# Patient Record
Sex: FEMALE | Race: WHITE | NOT HISPANIC OR LATINO | Employment: FULL TIME | URBAN - METROPOLITAN AREA
[De-identification: names, ages, dates, MRNs, and addresses within clinical notes are randomized per-mention and may not be internally consistent; named-entity substitution may affect disease eponyms.]

---

## 2017-02-21 ENCOUNTER — GENERIC CONVERSION - ENCOUNTER (OUTPATIENT)
Dept: OTHER | Facility: OTHER | Age: 57
End: 2017-02-21

## 2017-04-10 ENCOUNTER — ALLSCRIPTS OFFICE VISIT (OUTPATIENT)
Dept: OTHER | Facility: OTHER | Age: 57
End: 2017-04-10

## 2017-04-12 ENCOUNTER — ALLSCRIPTS OFFICE VISIT (OUTPATIENT)
Dept: OTHER | Facility: OTHER | Age: 57
End: 2017-04-12

## 2017-08-25 ENCOUNTER — TRANSCRIBE ORDERS (OUTPATIENT)
Dept: SLEEP CENTER | Facility: CLINIC | Age: 57
End: 2017-08-25

## 2017-08-25 DIAGNOSIS — G47.33 OSA AND COPD OVERLAP SYNDROME (HCC): Primary | ICD-10-CM

## 2017-08-25 DIAGNOSIS — J44.9 OSA AND COPD OVERLAP SYNDROME (HCC): Primary | ICD-10-CM

## 2017-09-15 ENCOUNTER — TRANSCRIBE ORDERS (OUTPATIENT)
Dept: ADMINISTRATIVE | Facility: HOSPITAL | Age: 57
End: 2017-09-15

## 2017-09-15 DIAGNOSIS — D68.62 LUPUS ANTICOAGULANT DISORDER (HCC): ICD-10-CM

## 2017-09-15 DIAGNOSIS — D69.3 IMMUNE THROMBOCYTOPENIC PURPURA (HCC): Primary | ICD-10-CM

## 2017-09-15 DIAGNOSIS — D50.8 IRON DEFICIENCY ANEMIA SECONDARY TO INADEQUATE DIETARY IRON INTAKE: ICD-10-CM

## 2017-09-21 ENCOUNTER — HOSPITAL ENCOUNTER (OUTPATIENT)
Dept: ULTRASOUND IMAGING | Facility: HOSPITAL | Age: 57
Discharge: HOME | End: 2017-09-21
Attending: INTERNAL MEDICINE
Payer: COMMERCIAL

## 2017-09-21 DIAGNOSIS — D50.8 IRON DEFICIENCY ANEMIA SECONDARY TO INADEQUATE DIETARY IRON INTAKE: ICD-10-CM

## 2017-09-21 DIAGNOSIS — D69.3 IMMUNE THROMBOCYTOPENIC PURPURA (HCC): ICD-10-CM

## 2017-09-21 DIAGNOSIS — D68.62 LUPUS ANTICOAGULANT DISORDER (HCC): ICD-10-CM

## 2017-09-21 PROCEDURE — 76700 US EXAM ABDOM COMPLETE: CPT

## 2017-10-23 ENCOUNTER — TRANSCRIBE ORDERS (OUTPATIENT)
Dept: ADMINISTRATIVE | Facility: HOSPITAL | Age: 57
End: 2017-10-23

## 2017-10-23 DIAGNOSIS — D69.3 IMMUNE THROMBOCYTOPENIC PURPURA (HCC): Primary | ICD-10-CM

## 2017-10-23 DIAGNOSIS — D50.8 IRON DEFICIENCY ANEMIA SECONDARY TO INADEQUATE DIETARY IRON INTAKE: ICD-10-CM

## 2017-10-23 DIAGNOSIS — D68.62 LUPUS ANTICOAGULANT DISORDER (HCC): ICD-10-CM

## 2017-10-28 ENCOUNTER — HOSPITAL ENCOUNTER (OUTPATIENT)
Dept: CT IMAGING | Facility: HOSPITAL | Age: 57
Discharge: HOME | End: 2017-10-28
Attending: INTERNAL MEDICINE
Payer: COMMERCIAL

## 2017-10-28 DIAGNOSIS — D68.62 LUPUS ANTICOAGULANT DISORDER (HCC): ICD-10-CM

## 2017-10-28 DIAGNOSIS — D69.3 IMMUNE THROMBOCYTOPENIC PURPURA (HCC): ICD-10-CM

## 2017-10-28 DIAGNOSIS — D50.8 IRON DEFICIENCY ANEMIA SECONDARY TO INADEQUATE DIETARY IRON INTAKE: ICD-10-CM

## 2017-10-28 PROCEDURE — 74178 CT ABD&PLV WO CNTR FLWD CNTR: CPT

## 2017-10-28 RX ADMIN — IODIXANOL 100 ML: 320 INJECTION, SOLUTION INTRAVASCULAR at 07:42

## 2018-01-08 ENCOUNTER — HOSPITAL ENCOUNTER (OUTPATIENT)
Dept: INFUSION CENTER | Facility: HOSPITAL | Age: 58
Discharge: HOME | End: 2018-01-10
Payer: COMMERCIAL

## 2018-01-08 ENCOUNTER — LAB REQUISITION (OUTPATIENT)
Dept: LAB | Facility: HOSPITAL | Age: 58
End: 2018-01-08
Payer: COMMERCIAL

## 2018-01-08 VITALS
RESPIRATION RATE: 18 BRPM | HEART RATE: 52 BPM | DIASTOLIC BLOOD PRESSURE: 65 MMHG | TEMPERATURE: 97.7 F | SYSTOLIC BLOOD PRESSURE: 135 MMHG

## 2018-01-08 DIAGNOSIS — D64.9 ANEMIA: ICD-10-CM

## 2018-01-08 LAB
ABO GROUP BLD: NORMAL
BLD GP AB SCN SERPL QL: NEGATIVE
RH BLD: POSITIVE
SPECIMEN EXPIRATION DATE: NORMAL

## 2018-01-08 PROCEDURE — 86850 RBC ANTIBODY SCREEN: CPT | Performed by: NURSE PRACTITIONER

## 2018-01-08 PROCEDURE — 96365 THER/PROPH/DIAG IV INF INIT: CPT

## 2018-01-08 PROCEDURE — 36430 TRANSFUSION BLD/BLD COMPNT: CPT

## 2018-01-08 PROCEDURE — P9035 PLATELET PHERES LEUKOREDUCED: HCPCS

## 2018-01-08 PROCEDURE — 96375 TX/PRO/DX INJ NEW DRUG ADDON: CPT

## 2018-01-08 PROCEDURE — 86901 BLOOD TYPING SEROLOGIC RH(D): CPT | Performed by: NURSE PRACTITIONER

## 2018-01-08 PROCEDURE — 86900 BLOOD TYPING SEROLOGIC ABO: CPT | Performed by: NURSE PRACTITIONER

## 2018-01-08 RX ORDER — LEVOTHYROXINE SODIUM 0.12 MG/1
125 TABLET ORAL DAILY
COMMUNITY
End: 2019-03-05 | Stop reason: SDUPTHER

## 2018-01-08 RX ORDER — ERGOCALCIFEROL 1.25 MG/1
CAPSULE ORAL WEEKLY
COMMUNITY
Start: 2011-11-01

## 2018-01-08 RX ORDER — ATENOLOL 25 MG/1
TABLET ORAL
COMMUNITY
Start: 2011-12-14 | End: 2019-04-05 | Stop reason: SDUPTHER

## 2018-01-08 RX ORDER — HYDROXYCHLOROQUINE SULFATE 200 MG/1
TABLET, FILM COATED ORAL
COMMUNITY
Start: 2011-11-04

## 2018-01-08 RX ORDER — FOLIC ACID 1 MG/1
TABLET ORAL
COMMUNITY
Start: 2017-12-09 | End: 2019-09-13 | Stop reason: ALTCHOICE

## 2018-01-08 RX ORDER — QUINAPRIL 5 1/1
10 TABLET ORAL DAILY
COMMUNITY
Start: 2012-01-10 | End: 2019-07-08 | Stop reason: SDUPTHER

## 2018-01-08 RX ORDER — ACETAMINOPHEN 325 MG/1
650 TABLET ORAL ONCE
Status: COMPLETED | OUTPATIENT
Start: 2018-01-08 | End: 2018-01-08

## 2018-01-08 RX ORDER — WARFARIN SODIUM 2.5 MG/1
1 TABLET ORAL DAILY
COMMUNITY
End: 2021-08-20 | Stop reason: SDUPTHER

## 2018-01-08 RX ADMIN — HYDROCORTISONE SODIUM SUCCINATE 100 MG: 100 INJECTION, POWDER, FOR SOLUTION INTRAMUSCULAR; INTRAVENOUS at 10:48

## 2018-01-08 RX ADMIN — ACETAMINOPHEN 650 MG: 325 TABLET, FILM COATED ORAL at 10:48

## 2018-01-08 RX ADMIN — DIPHENHYDRAMINE HYDROCHLORIDE 25 MG: 50 INJECTION, SOLUTION INTRAMUSCULAR; INTRAVENOUS at 10:48

## 2018-01-08 NOTE — PROGRESS NOTES
Pt presents to infusion center for platelets  Pt tolerated infusion without difficulty or complaint  Signs and symptoms of thrombocytopenia reviewed pt verbalized understanding pt states she is starting rituxan with Dr Roma Maldonado office this weds

## 2018-01-09 LAB
ABO GROUP BLD BPU: NORMAL
BPU ID: NORMAL
UNIT DISPENSE STATUS: NORMAL
UNIT PRODUCT CODE: NORMAL
UNIT RH: NORMAL

## 2018-01-11 NOTE — PROGRESS NOTES
Assessment    1  Physical examination of employee (V70 5) (Z02 89)    Plan  Physical examination of employee    · PPD    Discussion/Summary  health maintenance visit Currently, she eats an adequate diet and has an adequate exercise regimen  the risks and benefits of cervical cancer screening were discussed cervical cancer screening is current cervical cancer screening is managed by gyn Breast cancer screening: the risks and benefits of breast cancer screening were discussed, monthly self breast exam was advised, mammogram is current and breast cancer screening is managed by gyn  Colorectal cancer screening: the risks and benefits of colorectal cancer screening were discussed and colorectal cancer screening is current  Osteoporosis screening: the risks and benefits of osteoporosis screening were discussed  The risks and benefits of immunizations were discussed  Advice and education were given regarding nutrition, aerobic exercise, weight bearing exercise, weight loss, calcium supplements, vitamin D supplements, sunscreen use, self skin examination and fall risk reduction  PPDs skin test applied  Form completed  Patient to return to the office in 48-72 hours to read the PPD  Patient to continue present treatment  Possible side effects of new medications were reviewed with the patient/guardian today  The treatment plan was reviewed with the patient/guardian  The patient/guardian understands and agrees with the treatment plan      Chief Complaint  Physical - form to be completed      History of Present Illness  HM, Adult Female: The patient is being seen for a health maintenance evaluation  The last health maintenance visit was 5 year(s) ago  General Health: The patient's health since the last visit is described as good  She has regular dental visits  She denies vision problems  Vision care includes wearing glasses  She denies hearing loss  Lifestyle:  She consumes a diverse and healthy diet   She has weight concerns  She exercises regularly  She exercises 3 or more times per week for 30 or more minutes per session  Exercise includes walking and stretching/yoga/pilates  She does not use tobacco  She consumes alcohol  She reports occasional alcohol use  She denies drug use  Reproductive health: the patient is postmenopausal    Screening: cancer screening reviewed and current  metabolic screening reviewed and current  HPI: Patient is here for employment physical for [employer]  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 and PPD skin test  Patient is feeling well overall without complaints  Patient has been exercising regularly  Patient follows with her specialists regularly including cardiology, rheumatology and hematology  Active Problems    1  Acute sinusitis (461 9) (J01 90)   2  Acute URI (465 9) (J06 9)   3  Bronchitis, acute (466 0) (J20 9)   4  Cough (786 2) (R05)   5  Screening breast examination (V76 10) (Z12 39)    Past Medical History    · History of Acute URI (465 9) (J06 9)    Current Meds   1  Aspirin EC 81 MG Oral Tablet Delayed Release; Take 1 tablet daily as directed Recorded   2  Atenolol 25 MG Oral Tablet; Therapy: 61BMA3909 to (Last Rx:85Jsa4149)  Requested for: 50KHT0254 Ordered   3  Coumadin 2 5 MG Oral Tablet; Take 1 tablet daily Recorded   4  Hydroxychloroquine Sulfate 200 MG Oral Tablet; Therapy: 69XAX9160 to (Last Rx:04Nov2011)  Requested for: 58HLT1455 Ordered   5  Multivitamin TABS; Therapy: (Recorded:70Qta9225) to Recorded   6  Quinapril HCl - 5 MG Oral Tablet; Therapy: 12DLO7395 to (Last Rx:10Jan2012)  Requested for: 93CDB0781 Ordered   7  Synthroid 125 MCG Oral Tablet; TAKE 1 TABLET EVERY OTHER DAY Recorded   8  Vitamin D (Ergocalciferol) 17237 UNIT Oral Capsule; Therapy: 98FCY4055 to (Last Rx:01Nov2011)  Requested for: 30VKD5949 Ordered    Allergies    1  Penicillins   2   Erythromycin TABS    Vitals   Recorded: 10Apr2017 05:33PM Recorded: 10Apr2017 05:11PM   Temperature  98 8 F   Heart Rate 68    Respiration 16    Systolic 327    Diastolic 78    Height  5 ft 0 05 in   Weight  201 lb    BMI Calculated  39 19   BSA Calculated  1 87     Physical Exam    Constitutional   General appearance: No acute distress, well appearing and well nourished  Eyes   Conjunctiva and lids: No swelling, erythema or discharge  Ears, Nose, Mouth, and Throat   External inspection of ears and nose: Normal     Otoscopic examination: Tympanic membranes translucent with normal light reflex  Canals patent without erythema  Oropharynx: Normal with no erythema, edema, exudate or lesions  Pulmonary   Respiratory effort: No increased work of breathing or signs of respiratory distress  Auscultation of lungs: Clear to auscultation  Cardiovascular   Auscultation of heart: Abnormal   The heart rate was normal  The rhythm was regular  Heart sounds: a click was heard  Prosthetic valve: prosthetic mitral valve heard  Examination of extremities for edema and/or varicosities: Normal     Abdomen   Abdomen: Non-tender, no masses  Lymphatic   Palpation of lymph nodes in neck: No lymphadenopathy  Musculoskeletal   Gait and station: Normal     Inspection/palpation of joints, bones, and muscles: Normal     Skin   Skin and subcutaneous tissue: Normal without rashes or lesions  Psychiatric   Orientation to person, place, and time: Normal     Mood and affect: Normal        Health Management  Screening breast examination   MAMMO DIAGNOSTIC BILATERAL W CAD; every 1 year; Last [id number]; Next Due:  21BHS6199; Active    Signatures   Electronically signed by : Marya Ballard DO;  Apr 10 2017  5:50PM EST                       (Author)

## 2018-01-14 VITALS
TEMPERATURE: 98.8 F | RESPIRATION RATE: 16 BRPM | BODY MASS INDEX: 39.46 KG/M2 | HEIGHT: 60 IN | DIASTOLIC BLOOD PRESSURE: 78 MMHG | HEART RATE: 68 BPM | SYSTOLIC BLOOD PRESSURE: 134 MMHG | WEIGHT: 201 LBS

## 2018-01-15 ENCOUNTER — LAB (OUTPATIENT)
Dept: LAB | Facility: MEDICAL CENTER | Age: 58
End: 2018-01-15
Payer: COMMERCIAL

## 2018-01-15 ENCOUNTER — TRANSCRIBE ORDERS (OUTPATIENT)
Dept: ADMINISTRATIVE | Facility: HOSPITAL | Age: 58
End: 2018-01-15

## 2018-01-15 DIAGNOSIS — D69.3 AUTOIMMUNE THROMBOCYTOPENIA (HCC): ICD-10-CM

## 2018-01-15 DIAGNOSIS — D69.3 AUTOIMMUNE THROMBOCYTOPENIA (HCC): Primary | ICD-10-CM

## 2018-01-15 LAB
ERYTHROCYTE [DISTWIDTH] IN BLOOD BY AUTOMATED COUNT: 15.5 % (ref 11.6–15.1)
HCT VFR BLD AUTO: 39.4 % (ref 34.8–46.1)
HGB BLD-MCNC: 13 G/DL (ref 11.5–15.4)
INR PPP: 2.86 (ref 0.86–1.16)
MCH RBC QN AUTO: 26.6 PG (ref 26.8–34.3)
MCHC RBC AUTO-ENTMCNC: 33 G/DL (ref 31.4–37.4)
MCV RBC AUTO: 81 FL (ref 82–98)
PMV BLD AUTO: 12.2 FL (ref 8.9–12.7)
PROTHROMBIN TIME: 30.4 SECONDS (ref 12.1–14.4)
RBC # BLD AUTO: 4.88 MILLION/UL (ref 3.81–5.12)
WBC # BLD AUTO: 14.71 THOUSAND/UL (ref 4.31–10.16)

## 2018-01-15 PROCEDURE — 85610 PROTHROMBIN TIME: CPT

## 2018-01-15 PROCEDURE — 36415 COLL VENOUS BLD VENIPUNCTURE: CPT

## 2018-01-15 PROCEDURE — 85027 COMPLETE CBC AUTOMATED: CPT

## 2018-02-16 ENCOUNTER — LAB REQUISITION (OUTPATIENT)
Dept: LAB | Facility: HOSPITAL | Age: 58
End: 2018-02-16
Payer: COMMERCIAL

## 2018-02-16 DIAGNOSIS — D69.3 IMMUNE THROMBOCYTOPENIC PURPURA (HCC): ICD-10-CM

## 2018-02-16 DIAGNOSIS — D50.8 OTHER IRON DEFICIENCY ANEMIAS (CODE): ICD-10-CM

## 2018-02-16 LAB
IGA SERPL-MCNC: 609 MG/DL (ref 70–400)
IGG SERPL-MCNC: 1620 MG/DL (ref 700–1600)
IGM SERPL-MCNC: 141 MG/DL (ref 40–230)

## 2018-02-16 PROCEDURE — 82784 ASSAY IGA/IGD/IGG/IGM EACH: CPT | Performed by: NURSE PRACTITIONER

## 2018-02-19 ENCOUNTER — LAB REQUISITION (OUTPATIENT)
Dept: LAB | Facility: HOSPITAL | Age: 58
End: 2018-02-19
Payer: COMMERCIAL

## 2018-02-19 DIAGNOSIS — D50.8 OTHER IRON DEFICIENCY ANEMIAS (CODE): ICD-10-CM

## 2018-02-19 DIAGNOSIS — D69.3 IMMUNE THROMBOCYTOPENIC PURPURA (HCC): ICD-10-CM

## 2018-02-19 LAB
EST. AVERAGE GLUCOSE BLD GHB EST-MCNC: 134 MG/DL
HBA1C MFR BLD: 6.3 % (ref 4.2–6.3)

## 2018-02-19 PROCEDURE — 83036 HEMOGLOBIN GLYCOSYLATED A1C: CPT | Performed by: NURSE PRACTITIONER

## 2018-02-22 ENCOUNTER — LAB REQUISITION (OUTPATIENT)
Dept: LAB | Facility: HOSPITAL | Age: 58
End: 2018-02-22
Payer: COMMERCIAL

## 2018-02-22 DIAGNOSIS — D64.9 ANEMIA: ICD-10-CM

## 2018-02-22 LAB
BASOPHILS # BLD AUTO: 0.01 THOUSANDS/ΜL (ref 0–0.1)
BASOPHILS NFR BLD AUTO: 0 % (ref 0–1)
EOSINOPHIL # BLD AUTO: 0.08 THOUSAND/ΜL (ref 0–0.61)
EOSINOPHIL NFR BLD AUTO: 1 % (ref 0–6)
ERYTHROCYTE [DISTWIDTH] IN BLOOD BY AUTOMATED COUNT: 15.4 % (ref 11.6–15.1)
HCT VFR BLD AUTO: 34.1 % (ref 34.8–46.1)
HGB BLD-MCNC: 11.3 G/DL (ref 11.5–15.4)
LYMPHOCYTES # BLD AUTO: 0.85 THOUSANDS/ΜL (ref 0.6–4.47)
LYMPHOCYTES NFR BLD AUTO: 14 % (ref 14–44)
MCH RBC QN AUTO: 26.8 PG (ref 26.8–34.3)
MCHC RBC AUTO-ENTMCNC: 33.1 G/DL (ref 31.4–37.4)
MCV RBC AUTO: 81 FL (ref 82–98)
MONOCYTES # BLD AUTO: 0.43 THOUSAND/ΜL (ref 0.17–1.22)
MONOCYTES NFR BLD AUTO: 7 % (ref 4–12)
NEUTROPHILS # BLD AUTO: 4.67 THOUSANDS/ΜL (ref 1.85–7.62)
NEUTS SEG NFR BLD AUTO: 78 % (ref 43–75)
NRBC BLD AUTO-RTO: 0 /100 WBCS
PLATELET # BLD AUTO: 87 THOUSANDS/UL (ref 149–390)
PMV BLD AUTO: 12.8 FL (ref 8.9–12.7)
RBC # BLD AUTO: 4.22 MILLION/UL (ref 3.81–5.12)
WBC # BLD AUTO: 6.05 THOUSAND/UL (ref 4.31–10.16)

## 2018-02-22 PROCEDURE — 85025 COMPLETE CBC W/AUTO DIFF WBC: CPT | Performed by: INTERNAL MEDICINE

## 2018-02-27 ENCOUNTER — TRANSCRIBE ORDERS (OUTPATIENT)
Dept: ADMINISTRATIVE | Facility: HOSPITAL | Age: 58
End: 2018-02-27

## 2018-02-27 DIAGNOSIS — G47.30 SLEEP APNEA, UNSPECIFIED TYPE: Primary | ICD-10-CM

## 2018-04-24 ENCOUNTER — OFFICE VISIT (OUTPATIENT)
Dept: SLEEP CENTER | Facility: CLINIC | Age: 58
End: 2018-04-24
Payer: COMMERCIAL

## 2018-04-24 VITALS
BODY MASS INDEX: 37.99 KG/M2 | HEIGHT: 61 IN | DIASTOLIC BLOOD PRESSURE: 62 MMHG | WEIGHT: 201.2 LBS | HEART RATE: 76 BPM | SYSTOLIC BLOOD PRESSURE: 128 MMHG

## 2018-04-24 DIAGNOSIS — G47.33 OSA (OBSTRUCTIVE SLEEP APNEA): Primary | ICD-10-CM

## 2018-04-24 DIAGNOSIS — G47.30 SLEEP APNEA, UNSPECIFIED TYPE: ICD-10-CM

## 2018-04-24 PROCEDURE — 99243 OFF/OP CNSLTJ NEW/EST LOW 30: CPT | Performed by: INTERNAL MEDICINE

## 2018-04-24 RX ORDER — LEVOTHYROXINE SODIUM 112 MCG
112 TABLET ORAL DAILY
COMMUNITY
Start: 2018-04-16

## 2018-05-07 ENCOUNTER — LAB REQUISITION (OUTPATIENT)
Dept: LAB | Facility: HOSPITAL | Age: 58
End: 2018-05-07
Payer: COMMERCIAL

## 2018-05-07 DIAGNOSIS — D50.8 OTHER IRON DEFICIENCY ANEMIAS: ICD-10-CM

## 2018-05-07 DIAGNOSIS — D69.3 IMMUNE THROMBOCYTOPENIC PURPURA (HCC): ICD-10-CM

## 2018-05-07 PROCEDURE — 83516 IMMUNOASSAY NONANTIBODY: CPT | Performed by: NURSE PRACTITIONER

## 2018-05-07 PROCEDURE — 82784 ASSAY IGA/IGD/IGG/IGM EACH: CPT | Performed by: NURSE PRACTITIONER

## 2018-05-07 PROCEDURE — 86255 FLUORESCENT ANTIBODY SCREEN: CPT | Performed by: NURSE PRACTITIONER

## 2018-05-09 LAB
ENDOMYSIUM IGA SER QL: NEGATIVE
GLIADIN PEPTIDE IGA SER-ACNC: 12 UNITS (ref 0–19)
GLIADIN PEPTIDE IGG SER-ACNC: 4 UNITS (ref 0–19)
IGA SERPL-MCNC: 784 MG/DL (ref 87–352)
TTG IGA SER-ACNC: <2 U/ML (ref 0–3)
TTG IGG SER-ACNC: <2 U/ML (ref 0–5)

## 2018-06-08 ENCOUNTER — HOSPITAL ENCOUNTER (OUTPATIENT)
Dept: SLEEP CENTER | Facility: CLINIC | Age: 58
Discharge: HOME | End: 2018-06-08
Payer: COMMERCIAL

## 2018-06-08 DIAGNOSIS — G47.33 OSA AND COPD OVERLAP SYNDROME (HCC): ICD-10-CM

## 2018-06-08 DIAGNOSIS — J44.9 OSA AND COPD OVERLAP SYNDROME (HCC): ICD-10-CM

## 2018-06-08 PROCEDURE — 95810 POLYSOM 6/> YRS 4/> PARAM: CPT

## 2018-06-09 DIAGNOSIS — G47.33 OSA (OBSTRUCTIVE SLEEP APNEA): Primary | ICD-10-CM

## 2018-06-09 NOTE — PROGRESS NOTES
Sleep Study Documentation    Pre-Sleep Study       Sleep testing procedure explained to patient:YES    Patient napped prior to study:NO    Caffeine:Dayshift worker after 12PM   Caffeine use:YES- ice tea  Few sips    Alcohol:Dayshift workers after 5PM: Alcohol use:NO    Typical day for patient:YES       Study Documentation  Diagnostic   Snore: Moderate  Supplemental O2: no    O2 flow rate (L/min) range 0  O2 flow rate (L/min) final 0  Minimum SaO2 60  Baseline SaO2 95            EKG abnormalities: yes:  EPOCH example and comments:  Ocasional PVS noted, e  g  :  Epochs 201, 204, 206, 209  Also arrhythmia epoch 648  EEG abnormalities: no    Study Terminated:no           Post-Sleep Study    Medication used at bedtime or during sleep study:YES other prescription medications    Patient reports time it took to fall asleep:20 to 30 minutes    Patient reports waking up during study:1 to 2 times  Patient reports returning to sleep in 10 to 30 minutes  Patient reports sleeping 4 to 6 hours with dreaming  Patient reports sleep during study:worse than usual    Patient rated sleepiness: Somewhat sleepy or tired    PAP treatment:no

## 2018-06-12 ENCOUNTER — TELEPHONE (OUTPATIENT)
Dept: SLEEP CENTER | Facility: CLINIC | Age: 58
End: 2018-06-12

## 2018-06-12 NOTE — TELEPHONE ENCOUNTER
Informed patient of mild DAVIDSON on study and recommendation for CPAP study  She was congested night of study and prefers to discuss in follow up prior to any treatment  Scheduled follow up with Dr Manjula Rios 7/13

## 2018-10-03 ENCOUNTER — OFFICE VISIT (OUTPATIENT)
Dept: SLEEP CENTER | Facility: CLINIC | Age: 58
End: 2018-10-03
Payer: COMMERCIAL

## 2018-10-03 VITALS
SYSTOLIC BLOOD PRESSURE: 120 MMHG | BODY MASS INDEX: 38.14 KG/M2 | DIASTOLIC BLOOD PRESSURE: 60 MMHG | RESPIRATION RATE: 14 BRPM | HEART RATE: 64 BPM | HEIGHT: 61 IN | WEIGHT: 202 LBS

## 2018-10-03 DIAGNOSIS — G47.33 OSA (OBSTRUCTIVE SLEEP APNEA): Primary | ICD-10-CM

## 2018-10-03 PROCEDURE — 99214 OFFICE O/P EST MOD 30 MIN: CPT | Performed by: INTERNAL MEDICINE

## 2018-10-03 NOTE — PROGRESS NOTES
Progress Note - Sleep Center   Ayala Urban XMT:[birth year] MRN: [id number]      Reason for Visit:    66 y  o female mild obstructive sleep apnea, here to discuss test results    Assessment:  The patient had AHI = 10 2 on her diagnostic polysomnography, which in the context of her daytime sleepiness, warrants treatment  I have ordered APAP with a range of 6 to 20 cm  Plan:  Initiate APAP, order sent to [facility]    Follow up:  Compliance check    History of Present Illness:  Patient has mild obstructive sleep apnea on polysomnography  Past medical history includes hypothyroidism, valvular heart disease , status post MVR, ITP  The patient has no documented history of hypertension, arrhythmia or coronary artery disease  We discussed at length the positives and negatives of treatment of her obstructive sleep apnea  She agrees to move forward with APAP therapy  Historical Information    Past Medical History:   Past Medical History:   Diagnosis Date    Disease of thyroid gland     History of ITP     History of transfusion     Hypertension     Sleep apnea          Past Surgical History:   Past Surgical History:   Procedure Laterality Date    VALVE REPLACEMENT           Social History - see chart  History   Alcohol use: Not on file     History   Drug Use Not on file     History   Smoking Status    Not on file   Smokeless Tobacco    Not on file     Family History: History reviewed  No pertinent family history      Medications/Allergies:      Current Outpatient Prescriptions:     atenolol (TENORMIN) 25 mg tablet, Take by mouth, Disp: , Rfl:     ergocalciferol (VITAMIN D2) 50,000 units, Take by mouth, Disp: , Rfl:     folic acid (FOLVITE) 1 mg tablet, , Disp: , Rfl:     hydroxychloroquine (PLAQUENIL) 200 mg tablet, Take by mouth, Disp: , Rfl:     levothyroxine (SYNTHROID) 125 mcg tablet, Take 1 tablet by mouth daily, Disp: , Rfl:     quinapril (ACCUPRIL) 5 mg tablet, Take by mouth, Disp: , Rfl:    SYNTHROID 112 MCG tablet, , Disp: , Rfl:     warfarin (COUMADIN) 2 5 mg tablet, Take 1 tablet by mouth daily, Disp: , Rfl:       Objective    Vital Signs:   Vitals:    10/03/18 1300   BP: 120/60   BP Location: Left arm   Cuff Size: Large   Pulse: 64   Resp: 14   Weight: 91 6 kg (202 lb)   Height: 5' 1" (1 549 m)     Teller Sleepiness Scale: Total score: 10    Physical Exam:    General: Alert, appropriate, cooperative, overweight    Head: NC/AT    Skin: Warm, dry    Neuro: No motor abnormalities, cranial nerves appear intact    Psych: Normal affect      Counseling / Coordination of Care  Total clinic time spent today 15 minutes  Greater than 50% of total time was spent with the patient and / or family counseling and / or coordination of care  A description of the counseling / coordination of care: treatment was discussed    LISA Kessler    Board Certified Sleep Specialist  Review of Systems      Genitourinary none   Cardiology palpitations/fluttering feeling in the chest   Gastrointestinal frequent heartburn/acid reflux   Neurology none   Constitutional fatigue   Integumentary rash or dry skin and itching   Psychiatry none   Musculoskeletal none   Pulmonary none   ENT throat clearing   Endocrine none   Hematological none

## 2018-12-04 ENCOUNTER — OFFICE VISIT (OUTPATIENT)
Dept: SLEEP CENTER | Facility: CLINIC | Age: 58
End: 2018-12-04
Payer: COMMERCIAL

## 2018-12-04 VITALS
HEART RATE: 76 BPM | DIASTOLIC BLOOD PRESSURE: 60 MMHG | SYSTOLIC BLOOD PRESSURE: 110 MMHG | BODY MASS INDEX: 39.27 KG/M2 | WEIGHT: 208 LBS | HEIGHT: 61 IN

## 2018-12-04 DIAGNOSIS — G47.33 OSA (OBSTRUCTIVE SLEEP APNEA): Primary | ICD-10-CM

## 2018-12-04 PROCEDURE — 99214 OFFICE O/P EST MOD 30 MIN: CPT | Performed by: INTERNAL MEDICINE

## 2018-12-04 NOTE — PROGRESS NOTES
Progress Note - Sleep Center   Alexandre Ardon JKL:[birth year] MRN: [id number]      Reason for Visit:  66 y  o female here for PAP compliance check    Assessment:  Doing well with new PAP device  Sleep quality is improved and the patient reports less daytime sleepiness  Compliance data show utilization for greater than or equal to 70% of nights for greater than or equal to 4 hours per night  Plan:  Continue same    Follow up: One year    History of Present Illness:  History of DAVIDSON on PAP therapy  Fully compliant and deriving benefit  Historical Information    Past Medical History:   Past Medical History:   Diagnosis Date    Disease of thyroid gland     History of ITP     History of transfusion     Hypertension     Sleep apnea          Past Surgical History:   Past Surgical History:   Procedure Laterality Date    VALVE REPLACEMENT               Family History: History reviewed  No pertinent family history  Medications/Allergies:      Current Outpatient Prescriptions:     atenolol (TENORMIN) 25 mg tablet, Take by mouth, Disp: , Rfl:     ergocalciferol (VITAMIN D2) 50,000 units, Take by mouth, Disp: , Rfl:     folic acid (FOLVITE) 1 mg tablet, , Disp: , Rfl:     hydroxychloroquine (PLAQUENIL) 200 mg tablet, Take by mouth, Disp: , Rfl:     levothyroxine (SYNTHROID) 125 mcg tablet, Take 1 tablet by mouth daily, Disp: , Rfl:     quinapril (ACCUPRIL) 5 mg tablet, Take by mouth, Disp: , Rfl:     SYNTHROID 112 MCG tablet, , Disp: , Rfl:     warfarin (COUMADIN) 2 5 mg tablet, Take 1 tablet by mouth daily, Disp: , Rfl:       Objective    Vital Signs:   Vitals:    12/04/18 1400   BP: 110/60   Pulse: 76     Irvine Sleepiness Scale:  Total score: 6        Physical Exam:    General: Alert, appropriate, cooperative, overweight    Head: NC/AT    Skin: Warm, dry    Neuro: No motor abnormalities, cranial nerves appear intact    Extremity: No clubbing, cyanosis    PAP setting:   APAP 6 to 20 cm  AHI = 10 2  DME Provider:  Claudette Olmos / Coordination of Care  Total clinic time spent today 15 minutes  A description of the counseling / coordination of care: Discussed equipment and response to treatment  LISA Cheng    Board Certified Sleep Specialist      Review of Systems      Genitourinary none   Cardiology palpitations/fluttering feeling in the chest   Gastrointestinal frequent heartburn/acid reflux   Neurology none   Constitutional none   Integumentary rash or dry skin and itching   Psychiatry none   Musculoskeletal none   Pulmonary none   ENT none   Endocrine none   Hematological none

## 2019-01-28 ENCOUNTER — OFFICE VISIT (OUTPATIENT)
Dept: FAMILY MEDICINE CLINIC | Facility: CLINIC | Age: 59
End: 2019-01-28
Payer: COMMERCIAL

## 2019-01-28 VITALS
BODY MASS INDEX: 39.65 KG/M2 | SYSTOLIC BLOOD PRESSURE: 140 MMHG | WEIGHT: 210 LBS | DIASTOLIC BLOOD PRESSURE: 74 MMHG | HEART RATE: 64 BPM | TEMPERATURE: 99.3 F | RESPIRATION RATE: 16 BRPM | HEIGHT: 61 IN

## 2019-01-28 DIAGNOSIS — J06.9 UPPER RESPIRATORY TRACT INFECTION, UNSPECIFIED TYPE: Primary | ICD-10-CM

## 2019-01-28 DIAGNOSIS — J02.9 SORE THROAT: ICD-10-CM

## 2019-01-28 PROBLEM — I10 HTN (HYPERTENSION): Status: ACTIVE | Noted: 2017-04-10

## 2019-01-28 PROBLEM — I34.1 MVP (MITRAL VALVE PROLAPSE): Status: ACTIVE | Noted: 2017-04-10

## 2019-01-28 PROBLEM — M32.9 LUPUS (SYSTEMIC LUPUS ERYTHEMATOSUS) (HCC): Status: ACTIVE | Noted: 2017-04-10

## 2019-01-28 PROBLEM — E03.9 HYPOTHYROIDISM: Status: ACTIVE | Noted: 2017-04-10

## 2019-01-28 LAB — S PYO AG THROAT QL: NEGATIVE

## 2019-01-28 PROCEDURE — 99213 OFFICE O/P EST LOW 20 MIN: CPT | Performed by: FAMILY MEDICINE

## 2019-01-28 PROCEDURE — 87880 STREP A ASSAY W/OPTIC: CPT | Performed by: FAMILY MEDICINE

## 2019-01-28 RX ORDER — CHOLECALCIFEROL (VITAMIN D3) 25 MCG
2000 CAPSULE ORAL DAILY
COMMUNITY

## 2019-01-28 RX ORDER — ASCORBIC ACID 500 MG
500 TABLET ORAL DAILY
COMMUNITY

## 2019-01-28 RX ORDER — AZITHROMYCIN 250 MG/1
TABLET, FILM COATED ORAL
Qty: 6 TABLET | Refills: 0 | Status: SHIPPED | OUTPATIENT
Start: 2019-01-28 | End: 2019-02-02

## 2019-01-28 NOTE — PROGRESS NOTES
Assessment/Plan:  Rapid strep test is negative  Patient was started on a Z-Jere and may take Robitussin sue Camacho She is encouraged to drink plenty of fluids and rest   Return the office in 1 week or sooner sue Camacho Diagnoses and all orders for this visit:    Upper respiratory tract infection, unspecified type  Comments:  Z-Jere and Robitussin p r n   Increase fluids and rest   Orders:  -     azithromycin (ZITHROMAX) 250 mg tablet; Take 2 tablets today then 1 tablet daily x 4 days    Sore throat  Comments:  Rapid strep test is negative  Orders:  -     POCT rapid strepA    Other orders  -     Magnesium 400 MG CAPS; Take 2 capsules by mouth 2 (two) times a day  -     ascorbic acid (VITAMIN C) 500 mg tablet; Take 500 mg by mouth daily  -     Probiotic Product (PROBIOTIC-10) CHEW; Chew  -     Cholecalciferol (VITAMIN D-3) 1000 units CAPS; Take 2,000 Units by mouth daily          Subjective:      Patient ID: Emma Marin is a 66 y o  female  Patient started 3 days ago with sore throat, nasal congestion and cough  She denies fever  Patient has been treating this with OTC cold medications without significant relief  Patient did receive yearly flu vaccine this season  Sore Throat    This is a new problem  The current episode started in the past 7 days  The problem has been unchanged  There has been no fever  Associated symptoms include congestion, coughing, ear pain, a hoarse voice, a plugged ear sensation and shortness of breath  Pertinent negatives include no headaches or trouble swallowing  She has had no exposure to strep  She has tried acetaminophen and gargles for the symptoms  The treatment provided mild relief  Earache    Associated symptoms include coughing and a sore throat  Pertinent negatives include no headaches         The following portions of the patient's history were reviewed and updated as appropriate: allergies, current medications, past family history, past medical history, past social history, past surgical history and problem list     Review of Systems   HENT: Positive for congestion, ear pain, hoarse voice and sore throat  Negative for trouble swallowing  Respiratory: Positive for cough and shortness of breath  Neurological: Negative for headaches  Objective:      /74   Pulse 64   Temp 99 3 °F (37 4 °C) (Tympanic)   Resp 16   Ht 5' 0 5" (1 537 m)   Wt 95 3 kg (210 lb)   BMI 40 34 kg/m²          Physical Exam   Constitutional: She is oriented to person, place, and time  She appears well-developed and well-nourished  No distress  HENT:   Head: Normocephalic  Right Ear: External ear normal    Left Ear: External ear normal    Positive turbinates swelling with mucoid drainage  Throat positive erythema, mucous membranes moist    Eyes: Conjunctivae are normal  No scleral icterus  Neck: Neck supple  Cardiovascular: Normal rate and regular rhythm  Pulmonary/Chest: Effort normal and breath sounds normal  She has no wheezes  Abdominal: Soft  There is no tenderness  Musculoskeletal: She exhibits no edema  Lymphadenopathy:     She has no cervical adenopathy  Neurological: She is alert and oriented to person, place, and time  Skin: Skin is warm and dry  Psychiatric: She has a normal mood and affect

## 2019-02-14 ENCOUNTER — LAB REQUISITION (OUTPATIENT)
Dept: LAB | Facility: HOSPITAL | Age: 59
End: 2019-02-14
Payer: COMMERCIAL

## 2019-02-14 DIAGNOSIS — D69.3 IMMUNE THROMBOCYTOPENIC PURPURA (HCC): ICD-10-CM

## 2019-02-14 DIAGNOSIS — D50.8 OTHER IRON DEFICIENCY ANEMIAS: ICD-10-CM

## 2019-02-14 LAB — TSH SERPL DL<=0.05 MIU/L-ACNC: 1.69 UIU/ML

## 2019-02-14 PROCEDURE — 84443 ASSAY THYROID STIM HORMONE: CPT | Performed by: INTERNAL MEDICINE

## 2019-03-05 ENCOUNTER — OFFICE VISIT (OUTPATIENT)
Dept: CARDIOLOGY CLINIC | Facility: CLINIC | Age: 59
End: 2019-03-05
Payer: COMMERCIAL

## 2019-03-05 VITALS
DIASTOLIC BLOOD PRESSURE: 78 MMHG | HEART RATE: 59 BPM | HEIGHT: 61 IN | WEIGHT: 214 LBS | SYSTOLIC BLOOD PRESSURE: 130 MMHG | BODY MASS INDEX: 40.4 KG/M2

## 2019-03-05 DIAGNOSIS — I34.1 MVP (MITRAL VALVE PROLAPSE): Primary | ICD-10-CM

## 2019-03-05 DIAGNOSIS — Z95.2 S/P MVR (MITRAL VALVE REPLACEMENT): ICD-10-CM

## 2019-03-05 DIAGNOSIS — Z95.2 S/P MVR (MITRAL VALVE REPLACEMENT): Primary | ICD-10-CM

## 2019-03-05 PROCEDURE — 93000 ELECTROCARDIOGRAM COMPLETE: CPT | Performed by: INTERNAL MEDICINE

## 2019-03-05 PROCEDURE — 99243 OFF/OP CNSLTJ NEW/EST LOW 30: CPT | Performed by: INTERNAL MEDICINE

## 2019-03-05 NOTE — PROGRESS NOTES
Cardiology Follow Up    Nicole Blum  1960  [id number]  [address] [facility] [address] [address] [facility] [place] [address]  [phone]    1  MVP (mitral valve prolapse)  POCT ECG   2  S/P MVR (mitral valve replacement)         Interval History:   Continuation of cardiac care  Most pleasant 72-year-old female who has history of valvular heart disease, status post metallic [place] mitral valve replacement age 46, unclear etiology with myxomatous or rheumatic  She had been diagnosed with systemic lupus erythematosus few years prior to that  Since valve surgery the patient has been asymptomatic from the cardiac point of view, she is on chronic warfarin therapy, no bleeding issues adequate control  Her previous cardiologist is retired  She has history of mild obstructive sleep apnea positive pressure therapy, also history of ITP that improved with steroid therapy  The patient is active but does not exercise regularly  She current denies any symptoms including chest pain or dyspnea no orthopnea no PND  An echocardiogram over a year ago, report states adequate LV function properly functioning well-seated prosthetic mitral valve and mild tricuspid insufficiency with estimated normal pulmonary pressures suggested by Doppler criteria  There was mild left atrial enlargement as well      Patient Active Problem List   Diagnosis    DAVIDSON and COPD overlap syndrome (HCC)    HTN (hypertension)    Hypothyroidism    Lupus (systemic lupus erythematosus) ([address] )    MVP (mitral valve prolapse)    S/P MVR (mitral valve replacement)     Past Medical History:   Diagnosis Date    Disease of thyroid gland     History of ITP     History of transfusion     Hypertension     Sleep apnea      Social History     Socioeconomic History    Marital status: /Civil Union     Spouse name: Not on file    Number of children: Not on file    Years of education: Not on file    Highest education level: Not on file   Occupational History    Not on file   Social Needs    Financial resource strain: Not on file    Food insecurity:     Worry: Not on file     Inability: Not on file    Transportation needs:     Medical: Not on file     Non-medical: Not on file   Tobacco Use    Smoking status: Never Smoker    Smokeless tobacco: Never Used   Substance and Sexual Activity    Alcohol use: Yes     Comment: Social    Drug use: No    Sexual activity: Yes     Partners: Male   Lifestyle    Physical activity:     Days per week: Not on file     Minutes per session: Not on file    Stress: Not on file   Relationships    Social connections:     Talks on phone: Not on file     Gets together: Not on file     Attends L[employer] day service: Not on file     Active member of club or organization: Not on file     Attends meetings of clubs or organizations: Not on file     Relationship status: Not on file    Intimate partner violence:     Fear of current or ex partner: Not on file     Emotionally abused: Not on file     Physically abused: Not on file     Forced sexual activity: Not on file   Other Topics Concern    Not on file   Social History Narrative    Not on file      History reviewed  No pertinent family history    Past Surgical History:   Procedure Laterality Date    VALVE REPLACEMENT         Current Outpatient Medications:     ascorbic acid (VITAMIN C) 500 mg tablet, Take 500 mg by mouth daily, Disp: , Rfl:     atenolol (TENORMIN) 25 mg tablet, Take by mouth, Disp: , Rfl:     Cholecalciferol (VITAMIN D-3) 1000 units CAPS, Take 2,000 Units by mouth daily, Disp: , Rfl:     ergocalciferol (VITAMIN D2) 50,000 units, Take by mouth, Disp: , Rfl:     hydroxychloroquine (PLAQUENIL) 200 mg tablet, Take by mouth, Disp: , Rfl:     Magnesium 400 MG CAPS, Take 2 capsules by mouth 2 (two) times a day, Disp: , Rfl:     Probiotic Product (PROBIOTIC-10) CHEW, Chew, Disp: , Rfl:     quinapril (ACCUPRIL) 5 mg tablet, Take 10 mg by mouth daily  , Disp: , Rfl:     SYNTHROID 112 MCG tablet, , Disp: , Rfl:     warfarin (COUMADIN) 2 5 mg tablet, Take 1 tablet by mouth daily, Disp: , Rfl:     folic acid (FOLVITE) 1 mg tablet, , Disp: , Rfl:   Allergies   Allergen Reactions    Erythromycin     Penicillins Delerium and Hives    Percocet [Oxycodone-Acetaminophen] Nausea Only       Labs:  Lab Requisition on 02/14/2019   Component Date Value    TSH Baseline 02/14/2019 1 690    Office Visit on 01/28/2019   Component Date Value     RAPID STREP A 01/28/2019 Negative      Imaging: No results found  Review of Systems:  Review of Systems   Constitutional: Negative for activity change, appetite change, diaphoresis, fatigue, fever and unexpected weight change  HENT: Negative for hearing loss, nosebleeds, rhinorrhea and trouble swallowing  Eyes: Negative for visual disturbance  Respiratory: Negative for apnea, cough, choking, chest tightness, shortness of breath, wheezing and stridor  Cardiovascular: Negative for chest pain, palpitations and leg swelling  Gastrointestinal: Negative for abdominal distention, abdominal pain, anal bleeding, blood in stool, constipation, diarrhea, nausea, rectal pain and vomiting  Endocrine: Negative for cold intolerance and heat intolerance  Genitourinary: Negative for difficulty urinating, dysuria, flank pain, frequency, hematuria and urgency  Musculoskeletal: Negative for arthralgias, back pain, gait problem, joint swelling and myalgias  Skin: Negative for pallor and rash  Allergic/Immunologic: Negative for immunocompromised state  Neurological: Negative for dizziness, tremors, seizures, syncope, facial asymmetry, speech difficulty, weakness, light-headedness, numbness and headaches  Hematological: Bruises/bleeds easily  Psychiatric/Behavioral: Positive for sleep disturbance  Negative for confusion and decreased concentration   The patient is not nervous/anxious  Physical Exam:  Physical Exam   Constitutional: She is oriented to person, place, and time  HENT:   Head: Normocephalic  Eyes: Conjunctivae are normal  No scleral icterus  Neck: No JVD present  No tracheal deviation present  No thyromegaly present  Cardiovascular: Normal rate, regular rhythm, normal heart sounds and intact distal pulses  Exam reveals no gallop and no friction rub  No murmur heard  Smith metallic m 1   Pulmonary/Chest: Effort normal and breath sounds normal  No stridor  No respiratory distress  She has no wheezes  She has no rales  She exhibits no tenderness  Abdominal: Soft  Bowel sounds are normal  She exhibits no distension and no mass  There is no tenderness  There is no rebound and no guarding  No hernia  Musculoskeletal: She exhibits no edema  Neurological: She is alert and oriented to person, place, and time  Skin: Skin is warm and dry  No rash noted  No erythema  No pallor  Psychiatric: She has a normal mood and affect  Her behavior is normal  Thought content normal        Discussion/Summary:  Valvular heart disease, status post mitral replacement  Working properly  Will do a baseline echocardiogram   Continue warfarin Clinic, target INR of 2 5-3 5 no changes in medications  Regular exercise and weight management recommended  EKG is unremarkable    This note was completed in part utilizing [employer] direct voice recognition software  Grammatical errors, random word insertion, spelling mistakes, and incomplete sentences may be an occasional consequence of the system secondary to software limitations, ambient noise and hardware issues  At the time of dictation, efforts were made to edit, clarify and /or correct errors  Please read the chart carefully and recognize, using context, where substitutions have occurred    If you have any questions or concerns about the context, text or information contained within the body of this dictation, please contact myself, the provider, for further clarification

## 2019-03-06 LAB — INR PPP: 2.9 (ref 0.86–1.17)

## 2019-03-07 ENCOUNTER — ANTICOAG VISIT (OUTPATIENT)
Dept: CARDIOLOGY CLINIC | Facility: CLINIC | Age: 59
End: 2019-03-07

## 2019-03-07 DIAGNOSIS — Z95.2 S/P MVR (MITRAL VALVE REPLACEMENT): ICD-10-CM

## 2019-03-28 ENCOUNTER — ANTICOAG VISIT (OUTPATIENT)
Dept: CARDIOLOGY CLINIC | Facility: CLINIC | Age: 59
End: 2019-03-28

## 2019-03-28 DIAGNOSIS — Z95.2 S/P MVR (MITRAL VALVE REPLACEMENT): ICD-10-CM

## 2019-03-28 LAB — INR PPP: 2.13 (ref 0.86–1.17)

## 2019-04-05 DIAGNOSIS — I10 ESSENTIAL HYPERTENSION: Primary | ICD-10-CM

## 2019-04-08 RX ORDER — ATENOLOL 25 MG/1
25 TABLET ORAL DAILY
Qty: 90 TABLET | Refills: 3 | Status: SHIPPED | OUTPATIENT
Start: 2019-04-08 | End: 2019-07-08 | Stop reason: SDUPTHER

## 2019-04-09 ENCOUNTER — HOSPITAL ENCOUNTER (OUTPATIENT)
Dept: NON INVASIVE DIAGNOSTICS | Facility: CLINIC | Age: 59
Discharge: HOME | End: 2019-04-09
Payer: COMMERCIAL

## 2019-04-09 DIAGNOSIS — I34.1 MVP (MITRAL VALVE PROLAPSE): ICD-10-CM

## 2019-04-09 DIAGNOSIS — Z95.2 S/P MVR (MITRAL VALVE REPLACEMENT): ICD-10-CM

## 2019-04-09 PROCEDURE — 93306 TTE W/DOPPLER COMPLETE: CPT

## 2019-04-10 PROCEDURE — 93306 TTE W/DOPPLER COMPLETE: CPT | Performed by: INTERNAL MEDICINE

## 2019-04-16 ENCOUNTER — ANTICOAG VISIT (OUTPATIENT)
Dept: CARDIOLOGY CLINIC | Facility: CLINIC | Age: 59
End: 2019-04-16

## 2019-04-16 DIAGNOSIS — Z95.2 S/P MVR (MITRAL VALVE REPLACEMENT): ICD-10-CM

## 2019-04-16 LAB — INR PPP: 2.8 (ref 0.86–1.17)

## 2019-05-02 ENCOUNTER — ANTICOAG VISIT (OUTPATIENT)
Dept: CARDIOLOGY CLINIC | Facility: CLINIC | Age: 59
End: 2019-05-02

## 2019-05-02 ENCOUNTER — LAB REQUISITION (OUTPATIENT)
Dept: LAB | Facility: HOSPITAL | Age: 59
End: 2019-05-02
Payer: COMMERCIAL

## 2019-05-02 DIAGNOSIS — Z95.2 S/P MVR (MITRAL VALVE REPLACEMENT): ICD-10-CM

## 2019-05-02 DIAGNOSIS — D50.8 OTHER IRON DEFICIENCY ANEMIAS: ICD-10-CM

## 2019-05-02 DIAGNOSIS — D69.3 IMMUNE THROMBOCYTOPENIC PURPURA (HCC): ICD-10-CM

## 2019-05-02 LAB
INR PPP: 2.66 (ref 0.86–1.17)
PROTHROMBIN TIME: 28.4 SECONDS (ref 11.8–14.2)

## 2019-05-02 PROCEDURE — 85610 PROTHROMBIN TIME: CPT | Performed by: INTERNAL MEDICINE

## 2019-05-22 ENCOUNTER — OFFICE VISIT (OUTPATIENT)
Dept: FAMILY MEDICINE CLINIC | Facility: CLINIC | Age: 59
End: 2019-05-22
Payer: COMMERCIAL

## 2019-05-22 VITALS
HEIGHT: 61 IN | RESPIRATION RATE: 16 BRPM | SYSTOLIC BLOOD PRESSURE: 128 MMHG | HEART RATE: 72 BPM | DIASTOLIC BLOOD PRESSURE: 86 MMHG | OXYGEN SATURATION: 98 % | TEMPERATURE: 98 F | BODY MASS INDEX: 39.65 KG/M2 | WEIGHT: 210 LBS

## 2019-05-22 DIAGNOSIS — Z00.00 PHYSICAL EXAM: Primary | ICD-10-CM

## 2019-05-22 PROCEDURE — 99396 PREV VISIT EST AGE 40-64: CPT | Performed by: FAMILY MEDICINE

## 2019-05-23 LAB — INR PPP: 2.7 (ref 0.86–1.17)

## 2019-05-24 ENCOUNTER — ANTICOAG VISIT (OUTPATIENT)
Dept: CARDIOLOGY CLINIC | Facility: CLINIC | Age: 59
End: 2019-05-24

## 2019-05-24 DIAGNOSIS — Z95.2 S/P MVR (MITRAL VALVE REPLACEMENT): ICD-10-CM

## 2019-05-28 ENCOUNTER — CLINICAL SUPPORT (OUTPATIENT)
Dept: FAMILY MEDICINE CLINIC | Facility: CLINIC | Age: 59
End: 2019-05-28
Payer: COMMERCIAL

## 2019-05-28 DIAGNOSIS — Z00.00 PHYSICAL EXAM: Primary | ICD-10-CM

## 2019-05-28 PROCEDURE — 86580 TB INTRADERMAL TEST: CPT

## 2019-05-30 ENCOUNTER — CLINICAL SUPPORT (OUTPATIENT)
Dept: FAMILY MEDICINE CLINIC | Facility: CLINIC | Age: 59
End: 2019-05-30

## 2019-05-30 DIAGNOSIS — Z11.1 ENCOUNTER FOR PPD SKIN TEST READING: Primary | ICD-10-CM

## 2019-05-30 LAB
INDURATION: NORMAL MM
TB SKIN TEST: NEGATIVE

## 2019-06-07 ENCOUNTER — ANTICOAG VISIT (OUTPATIENT)
Dept: CARDIOLOGY CLINIC | Facility: CLINIC | Age: 59
End: 2019-06-07

## 2019-06-07 DIAGNOSIS — Z95.2 S/P MVR (MITRAL VALVE REPLACEMENT): ICD-10-CM

## 2019-06-07 LAB — INR PPP: 2.7 (ref 0.86–1.17)

## 2019-07-08 DIAGNOSIS — I10 ESSENTIAL HYPERTENSION: ICD-10-CM

## 2019-07-08 DIAGNOSIS — I10 ESSENTIAL HYPERTENSION: Primary | ICD-10-CM

## 2019-07-08 RX ORDER — QUINAPRIL 5 1/1
10 TABLET ORAL DAILY
Qty: 180 TABLET | Refills: 2 | Status: SHIPPED | OUTPATIENT
Start: 2019-07-08 | End: 2019-07-08 | Stop reason: SDUPTHER

## 2019-07-08 RX ORDER — QUINAPRIL 5 1/1
10 TABLET ORAL DAILY
Qty: 14 TABLET | Refills: 0 | OUTPATIENT
Start: 2019-07-08 | End: 2019-07-09 | Stop reason: SDUPTHER

## 2019-07-08 RX ORDER — ATENOLOL 25 MG/1
25 TABLET ORAL DAILY
Qty: 90 TABLET | Refills: 3 | Status: SHIPPED | OUTPATIENT
Start: 2019-07-08 | End: 2020-03-24 | Stop reason: SDUPTHER

## 2019-07-09 ENCOUNTER — ANTICOAG VISIT (OUTPATIENT)
Dept: CARDIOLOGY CLINIC | Facility: CLINIC | Age: 59
End: 2019-07-09

## 2019-07-09 DIAGNOSIS — Z95.2 S/P MVR (MITRAL VALVE REPLACEMENT): ICD-10-CM

## 2019-07-09 DIAGNOSIS — I10 ESSENTIAL HYPERTENSION: ICD-10-CM

## 2019-07-09 LAB — INR PPP: 3.2 (ref 0.84–1.19)

## 2019-07-09 RX ORDER — QUINAPRIL 10 MG/1
10 TABLET ORAL DAILY
Qty: 14 TABLET | Refills: 1 | OUTPATIENT
Start: 2019-07-09 | End: 2020-03-24

## 2019-07-31 ENCOUNTER — ANTICOAG VISIT (OUTPATIENT)
Dept: CARDIOLOGY CLINIC | Facility: CLINIC | Age: 59
End: 2019-07-31

## 2019-07-31 DIAGNOSIS — Z95.2 S/P MVR (MITRAL VALVE REPLACEMENT): ICD-10-CM

## 2019-07-31 LAB — INR PPP: 2.6 (ref 0.84–1.19)

## 2019-08-26 ENCOUNTER — TRANSCRIBE ORDERS (OUTPATIENT)
Dept: ADMINISTRATIVE | Facility: HOSPITAL | Age: 59
End: 2019-08-26

## 2019-08-26 DIAGNOSIS — D68.62 LUPUS ANTICOAGULANT DISORDER (HCC): ICD-10-CM

## 2019-08-26 DIAGNOSIS — D69.3 IMMUNE THROMBOCYTOPENIC PURPURA (HCC): Primary | ICD-10-CM

## 2019-08-26 DIAGNOSIS — D50.8 IRON DEFICIENCY ANEMIA SECONDARY TO INADEQUATE DIETARY IRON INTAKE: ICD-10-CM

## 2019-08-29 ENCOUNTER — HOSPITAL ENCOUNTER (OUTPATIENT)
Dept: ULTRASOUND IMAGING | Facility: HOSPITAL | Age: 59
Discharge: HOME | End: 2019-08-29
Attending: INTERNAL MEDICINE
Payer: COMMERCIAL

## 2019-08-29 DIAGNOSIS — D68.62 LUPUS ANTICOAGULANT DISORDER (HCC): ICD-10-CM

## 2019-08-29 DIAGNOSIS — D50.8 IRON DEFICIENCY ANEMIA SECONDARY TO INADEQUATE DIETARY IRON INTAKE: ICD-10-CM

## 2019-08-29 DIAGNOSIS — D69.3 IMMUNE THROMBOCYTOPENIC PURPURA (HCC): ICD-10-CM

## 2019-08-29 PROCEDURE — 76700 US EXAM ABDOM COMPLETE: CPT

## 2019-09-10 ENCOUNTER — ANTICOAG VISIT (OUTPATIENT)
Dept: CARDIOLOGY CLINIC | Facility: CLINIC | Age: 59
End: 2019-09-10

## 2019-09-10 DIAGNOSIS — Z95.2 S/P MVR (MITRAL VALVE REPLACEMENT): ICD-10-CM

## 2019-09-10 LAB — INR PPP: 3 (ref 0.84–1.19)

## 2019-09-13 ENCOUNTER — OFFICE VISIT (OUTPATIENT)
Dept: CARDIOLOGY CLINIC | Facility: CLINIC | Age: 59
End: 2019-09-13
Payer: COMMERCIAL

## 2019-09-13 VITALS
DIASTOLIC BLOOD PRESSURE: 70 MMHG | BODY MASS INDEX: 40.84 KG/M2 | HEART RATE: 62 BPM | WEIGHT: 208 LBS | SYSTOLIC BLOOD PRESSURE: 132 MMHG | HEIGHT: 60 IN | OXYGEN SATURATION: 98 %

## 2019-09-13 DIAGNOSIS — Z95.2 S/P MVR (MITRAL VALVE REPLACEMENT): ICD-10-CM

## 2019-09-13 DIAGNOSIS — I34.1 MVP (MITRAL VALVE PROLAPSE): Primary | ICD-10-CM

## 2019-09-13 DIAGNOSIS — I10 ESSENTIAL HYPERTENSION: ICD-10-CM

## 2019-09-13 PROCEDURE — 3078F DIAST BP <80 MM HG: CPT | Performed by: INTERNAL MEDICINE

## 2019-09-13 PROCEDURE — 3075F SYST BP GE 130 - 139MM HG: CPT | Performed by: INTERNAL MEDICINE

## 2019-09-13 PROCEDURE — 99214 OFFICE O/P EST MOD 30 MIN: CPT | Performed by: INTERNAL MEDICINE

## 2019-09-13 NOTE — PROGRESS NOTES
Cardiology   Anselmo Benoit 64 y o  female MRN: [id number]        Impression:  1  S/P mechanical MVR - doing well  On Warfarin  2  ITP - will discontinue ASA  Patient has no CAD  3  Hypertension - borderline control  Recommendations:  1  Discontinue aspirin  2  Continue remainder of medications  3  Follow up in 6 months  HPI: Anselmo Benoit is a 64y o  year old female with a history of mechancial MVR, HTN, SLE, ITP, who presents for follow up  Echocardiogram 4/19 demonstrated normal LV systolic function with normal MVR  Asymptomatic from a cardiac standpoint  No chest pain, shortness of breath, or palpitations  Review of Systems   Constitutional: Negative  HENT: Negative  Eyes: Negative  Respiratory: Negative for chest tightness and shortness of breath  Cardiovascular: Negative for chest pain, palpitations and leg swelling  Gastrointestinal: Negative  Endocrine: Negative  Genitourinary: Negative  Musculoskeletal: Negative  Skin: Negative  Allergic/Immunologic: Negative  Neurological: Negative  Hematological: Negative  Psychiatric/Behavioral: Negative  All other systems reviewed and are negative  Past Medical History:   Diagnosis Date    Disease of thyroid gland     History of ITP     History of transfusion     Hypertension     Sleep apnea      Past Surgical History:   Procedure Laterality Date    VALVE REPLACEMENT       Social History     Substance and Sexual Activity   Alcohol Use Yes    Comment: Social     Social History     Substance and Sexual Activity   Drug Use No     Social History     Tobacco Use   Smoking Status Never Smoker   Smokeless Tobacco Never Used     Family History   Problem Relation Age of Onset    Diabetes Father     Other Father         Multiple myeloma     Coronary artery disease Maternal Grandmother     Diabetes Maternal Grandmother        Allergies:   Allergies   Allergen Reactions    Erythromycin     Penicillins Delirium and Hives    Percocet [Oxycodone-Acetaminophen] Nausea Only       Medications:     Current Outpatient Medications:     ascorbic acid (VITAMIN C) 500 mg tablet, Take 500 mg by mouth daily, Disp: , Rfl:     atenolol (TENORMIN) 25 mg tablet, Take 1 tablet (25 mg total) by mouth daily, Disp: 90 tablet, Rfl: 3    Cholecalciferol (VITAMIN D-3) 1000 units CAPS, Take 2,000 Units by mouth daily, Disp: , Rfl:     ergocalciferol (VITAMIN D2) 50,000 units, Take by mouth, Disp: , Rfl:     hydroxychloroquine (PLAQUENIL) 200 mg tablet, Take by mouth, Disp: , Rfl:     Magnesium 400 MG CAPS, Take 4 capsules by mouth daily , Disp: , Rfl:     Probiotic Product (PROBIOTIC-10) CHEW, Chew, Disp: , Rfl:     quinapril (ACCUPRIL) 10 mg tablet, Take 1 tablet (10 mg total) by mouth daily, Disp: 14 tablet, Rfl: 1    SYNTHROID 112 MCG tablet, , Disp: , Rfl:     warfarin (COUMADIN) 2 5 mg tablet, Take 1 tablet by mouth daily, Disp: , Rfl:       Wt Readings from Last 3 Encounters:   09/13/19 94 3 kg (208 lb)   05/22/19 95 3 kg (210 lb)   03/05/19 97 1 kg (214 lb)     Temp Readings from Last 3 Encounters:   05/22/19 98 °F (36 7 °C) (Temporal)   01/28/19 99 3 °F (37 4 °C) (Tympanic)   01/08/18 97 7 °F (36 5 °C)     BP Readings from Last 3 Encounters:   09/13/19 132/70   05/22/19 128/86   03/05/19 130/78     Pulse Readings from Last 3 Encounters:   09/13/19 62   05/22/19 72   03/05/19 59         Physical Exam   Constitutional: She is oriented to person, place, and time  She appears well-developed  HENT:   Head: Atraumatic  Eyes: EOM are normal    Neck: Normal range of motion  Cardiovascular: Normal rate, regular rhythm and normal heart sounds  Mechanical heart sounds   Pulmonary/Chest: Effort normal and breath sounds normal  No respiratory distress  Abdominal: Soft  Musculoskeletal: Normal range of motion  Neurological: She is alert and oriented to person, place, and time  Skin: Skin is warm and dry  Psychiatric: She has a normal mood and affect  Cardiac testing:     Results for orders placed during the hospital encounter of 19   Echo complete with contrast if indicated    Narrative Patricia [address]  [facility], [address] [facility]  [phone]    Transthoracic Echocardiogram  2D, M-mode, Doppler, and Color Doppler    Study date:  2019    Patient: Gene Trejo  MR number: [id number]  Account number: [de-identified]  : 1960  Age: 66 years  Gender: Female  Status: Outpatient  Location: [address] [place] [address] [facility]  Height: 61 in  Weight: 214 lb  BP: 130/ 78 mmHg    Indications: Mitral valve replacement    Diagnoses: I34 1 - Nonrheumatic mitral (valve) prolapse, I34 9 - Nonrheumatic mitral valve disorder, unspecified    Sonographer:  SHRADDHA Shepherd  Primary Physician:  Mariah Centeno DO  Referring Physician:  Jesse Rios MD  Group:  [facility]  Interpreting Physician:  Bradford Banegas MD    SUMMARY    LEFT VENTRICLE:  Systolic function was normal  Ejection fraction was estimated in the range of 55 % to 65 %  There were no regional wall motion abnormalities  MITRAL VALVE:  A mechanical prosthesis was present  It exhibited normal function  TRICUSPID VALVE:  There was trace regurgitation  PULMONIC VALVE:  There was trace regurgitation  HISTORY: PRIOR HISTORY: Hypertension, mechanical MVR, sleep apnea, COPD, hypothyroidism    PROCEDURE: The study was performed in the [address]  This was a routine study  The transthoracic approach was used  The study included complete 2D imaging, M-mode, complete spectral Doppler, and color Doppler  Image  quality was adequate  LEFT VENTRICLE: Size was normal  Systolic function was normal  Ejection fraction was estimated in the range of 55 % to 65 %  There were no regional wall motion abnormalities   Wall thickness was normal  DOPPLER: The study was not  technically sufficient to allow evaluation of LV diastolic function  RIGHT VENTRICLE: The size was normal  Systolic function was normal  Wall thickness was normal     LEFT ATRIUM: Size was at the upper limits of normal     RIGHT ATRIUM: Size was normal     MITRAL VALVE: A mechanical prosthesis was present  It exhibited normal function  AORTIC VALVE: The valve was trileaflet  Leaflets exhibited normal thickness and normal cuspal separation  DOPPLER: Transaortic velocity was within the normal range  There was no evidence for stenosis  There was no regurgitation  TRICUSPID VALVE: DOPPLER: There was trace regurgitation  PULMONIC VALVE: DOPPLER: There was trace regurgitation  PERICARDIUM: There was no pericardial effusion  The pericardium was normal in appearance  AORTA: The root exhibited normal size      SYSTEM MEASUREMENT TABLES    2D  %FS: 34 1 %  Ao Diam: 3 18 cm  EDV(Teich): 132 42 ml  EF(Cube): 71 38 %  EF(Teich): 62 63 %  ESV(Cube): 41 43 ml  ESV(Teich): 49 49 ml  IVSd: 1 04 cm  LA Area: 19 96 cm2  LA Diam: 3 84 cm  LVEDV MOD A4C: 136 81 ml  LVEF MOD A4C: 59 35 %  LVESV MOD A4C: 55 62 ml  LVIDd: 5 25 cm  LVIDs: 3 46 cm  LVLd A4C: 7 38 cm  LVLs A4C: 6 19 cm  LVPWd: 1 03 cm  RA Area: 16 15 cm2  RV Diam : 4 18 cm  SV MOD A4C: 81 19 ml  SV(Cube): 103 3 ml  SV(Teich): 82 94 ml    CW  MV VTI: 42 17 cm  MV Vmax: 1 53 m/s  MV Vmean: 0 86 m/s  MV maxP 39 mmHg  MV meanPG: 3 56 mmHg  TR Vmax: 2 64 m/s  TR maxP 79 mmHg    MM  TAPSE: 2 24 cm    PW  E': 0 06 m/s  E/E': 28 44  MV A Bobby: 0 98 m/s  MV Dec [place]: 5 06 m/s2  MV DecT: 338 77 ms  MV E Bobby: 1 71 m/s  MV E/A Ratio: 1 75    [employer] Accredited Echocardiography Laboratory    Prepared and electronically signed by    Tony Grimm MD  Signed 10-Apr-2019 08:44:29

## 2019-09-13 NOTE — PATIENT INSTRUCTIONS
Recommendations:  1  Discontinue aspirin  2  Continue remainder of medications  3  Follow up in 6 months

## 2019-10-31 ENCOUNTER — ANTICOAG VISIT (OUTPATIENT)
Dept: CARDIOLOGY CLINIC | Facility: CLINIC | Age: 59
End: 2019-10-31

## 2019-10-31 DIAGNOSIS — Z95.2 S/P MVR (MITRAL VALVE REPLACEMENT): ICD-10-CM

## 2019-10-31 LAB — INR PPP: 2.98 (ref 0.84–1.19)

## 2019-11-10 ENCOUNTER — OFFICE VISIT (OUTPATIENT)
Dept: URGENT CARE | Age: 59
End: 2019-11-10
Payer: COMMERCIAL

## 2019-11-10 VITALS
RESPIRATION RATE: 16 BRPM | TEMPERATURE: 98.9 F | BODY MASS INDEX: 39.78 KG/M2 | HEART RATE: 72 BPM | SYSTOLIC BLOOD PRESSURE: 160 MMHG | DIASTOLIC BLOOD PRESSURE: 75 MMHG | WEIGHT: 202.6 LBS | HEIGHT: 60 IN | OXYGEN SATURATION: 97 %

## 2019-11-10 DIAGNOSIS — J01.90 ACUTE NON-RECURRENT SINUSITIS, UNSPECIFIED LOCATION: Primary | ICD-10-CM

## 2019-11-10 DIAGNOSIS — J40 BRONCHITIS: ICD-10-CM

## 2019-11-10 PROCEDURE — 99213 OFFICE O/P EST LOW 20 MIN: CPT | Performed by: PHYSICIAN ASSISTANT

## 2019-11-10 RX ORDER — BENZONATATE 100 MG/1
100 CAPSULE ORAL 3 TIMES DAILY PRN
Qty: 20 CAPSULE | Refills: 0 | Status: SHIPPED | OUTPATIENT
Start: 2019-11-10 | End: 2020-02-03 | Stop reason: ALTCHOICE

## 2019-11-10 RX ORDER — FLUTICASONE PROPIONATE 50 MCG
1 SPRAY, SUSPENSION (ML) NASAL DAILY
Qty: 1 BOTTLE | Refills: 0 | Status: SHIPPED | OUTPATIENT
Start: 2019-11-10 | End: 2020-02-03 | Stop reason: ALTCHOICE

## 2019-11-10 RX ORDER — ALBUTEROL SULFATE 90 UG/1
2 AEROSOL, METERED RESPIRATORY (INHALATION) EVERY 6 HOURS PRN
Qty: 8.5 G | Refills: 0 | Status: SHIPPED | OUTPATIENT
Start: 2019-11-10 | End: 2020-02-03 | Stop reason: ALTCHOICE

## 2019-11-10 RX ORDER — AZITHROMYCIN 250 MG/1
TABLET, FILM COATED ORAL
Qty: 6 TABLET | Refills: 0 | Status: SHIPPED | OUTPATIENT
Start: 2019-11-10 | End: 2019-11-14

## 2019-11-10 NOTE — PATIENT INSTRUCTIONS
Acute Bronchitis   WHAT YOU NEED TO KNOW:   Acute bronchitis is swelling and irritation in the air passages of your lungs  This irritation may cause you to cough or have other breathing problems  Acute bronchitis often starts because of another illness, such as a cold or the flu  The illness spreads from your nose and throat to your windpipe and airways  Bronchitis is often called a chest cold  Acute bronchitis lasts about 3 to 6 weeks and is usually not a serious illness  Your cough can last for several weeks  DISCHARGE INSTRUCTIONS:   Return to the emergency department if:   · You cough up blood  · Your lips or fingernails turn blue  · You feel like you are not getting enough air when you breathe  Contact your healthcare provider if:   · You have a fever  · Your breathing problems do not go away or get worse  · Your cough does not get better within 4 weeks  · You have questions or concerns about your condition or care  Self-care:   · Get more rest   Rest helps your body to heal  Slowly start to do more each day  Rest when you feel it is needed  · Avoid irritants in the air  Avoid chemicals, fumes, and dust  Wear a face mask if you must work around dust or fumes  Stay inside on days when air pollution levels are high  If you have allergies, stay inside when pollen counts are high  Do not use aerosol products, such as spray-on deodorant, bug spray, and hair spray  · Do not smoke or be around others who smoke  Nicotine and other chemicals in cigarettes and cigars damages the cilia that move mucus out of your lungs  Ask your healthcare provider for information if you currently smoke and need help to quit  E-cigarettes or smokeless tobacco still contain nicotine  Talk to your healthcare provider before you use these products  · Drink liquids as directed  Liquids help keep your air passages moist and help you cough up mucus   You may need to drink more liquids when you have acute bronchitis  Ask how much liquid to drink each day and which liquids are best for you  · Use a humidifier or vaporizer  Use a cool mist humidifier or a vaporizer to increase air moisture in your home  This may make it easier for you to breathe and help decrease your cough  Decrease risk for acute bronchitis:   · Get the vaccinations you need  Ask your healthcare provider if you should get vaccinated against the flu or pneumonia  · Prevent the spread of germs  You can decrease your risk of acute bronchitis and other illnesses by doing the following:     [facility] your hands often with soap and water  Carry germ-killing hand lotion or gel with you  You can use the lotion or gel to clean your hands when soap and water are not available  ¨ Do not touch your eyes, nose, or mouth unless you have washed your hands first     ¨ Always cover your mouth when you cough to prevent the spread of germs  It is best to cough into a tissue or your shirt sleeve instead of into your hand  Ask those around you cover their mouths when they cough  ¨ Try to avoid people who have a cold or the flu  If you are sick, stay away from others as much as possible  Medicines: Your healthcare provider may  give you any of the following:  · Ibuprofen or acetaminophen  are medicines that help lower your fever  They are available without a doctor's order  Ask your healthcare provider which medicine is right for you  Ask how much to take and how often to take it  Follow directions  These medicines can cause stomach bleeding if not taken correctly  Ibuprofen can cause kidney damage  Do not take ibuprofen if you have kidney disease, an ulcer, or allergies to aspirin  Acetaminophen can cause liver damage  Do not take more than 4,000 milligrams in 24 hours  · Decongestants  help loosen mucus in your lungs and make it easier to cough up  This can help you breathe easier  · Cough suppressants  decrease your urge to cough   If your cough produces mucus, do not take a cough suppressant unless your healthcare provider tells you to  Your healthcare provider may suggest that you take a cough suppressant at night so you can rest     · Inhalers  may be given  Your healthcare provider may give you one or more inhalers to help you breathe easier and cough less  An inhaler gives your medicine to open your airways  Ask your healthcare provider to show you how to use your inhaler correctly  · Take your medicine as directed  Contact your healthcare provider if you think your medicine is not helping or if you have side effects  Tell him of her if you are allergic to any medicine  Keep a list of the medicines, vitamins, and herbs you take  Include the amounts, and when and why you take them  Bring the list or the pill bottles to follow-up visits  Carry your medicine list with you in case of an emergency  Follow up with your healthcare provider as directed:  Write down questions you have so you will remember to ask them during your follow-up visits  © 2017 [address] Information is for End User's use only and may not be sold, redistributed or otherwise used for commercial purposes  All illustrations and images included in CareNotes® are the copyrighted property of [employer]  or [employer]  The above information is an  only  It is not intended as medical advice for individual conditions or treatments  Talk to your doctor, nurse or pharmacist before following any medical regimen to see if it is safe and effective for you  Sinusitis   WHAT YOU NEED TO KNOW:   Sinusitis is inflammation or infection of your sinuses  It is most often caused by a virus  Acute sinusitis may last up to 12 weeks  Chronic sinusitis lasts longer than 12 weeks  Recurrent sinusitis means you have 4 or more times in 1 year     DISCHARGE INSTRUCTIONS:   Return to the emergency department if:   · Your eye and eyelid are red, swollen, and painful  · You cannot open your eye  · You have vision changes, such as double vision  · Your eyeball bulges out or you cannot move your eye  · You are more sleepy than normal, or you notice changes in your ability to think, move, or talk  · You have a stiff neck, a fever, or a bad headache  · You have swelling of your forehead or scalp  Contact your healthcare provider if:   · Your symptoms do not improve after 3 days  · Your symptoms do not go away after 10 days  · You have nausea and are vomiting  · Your nose is bleeding  · You have questions or concerns about your condition or care  Medicines: Your symptoms may go away on their own  Your healthcare provider may recommend watchful waiting for up to 10 days before starting antibiotics  You may  need any of the following:  · Acetaminophen  decreases pain and fever  It is available without a doctor's order  Ask how much to take and how often to take it  Follow directions  Read the labels of all other medicines you are using to see if they also contain acetaminophen, or ask your doctor or pharmacist  Acetaminophen can cause liver damage if not taken correctly  Do not use more than 4 grams (4,000 milligrams) total of acetaminophen in one day  · NSAIDs , such as ibuprofen, help decrease swelling, pain, and fever  This medicine is available with or without a doctor's order  NSAIDs can cause stomach bleeding or kidney problems in certain people  If you take blood thinner medicine, always ask your healthcare provider if NSAIDs are safe for you  Always read the medicine label and follow directions  · Nasal steroid sprays  may help decrease inflammation in your nose and sinuses  · Decongestants  help reduce swelling and drain mucus in the nose and sinuses  They may help you breathe easier  · Antihistamines  help dry mucus in the nose and relieve sneezing       · Antibiotics  help treat or prevent a bacterial infection  · Take your medicine as directed  Contact your healthcare provider if you think your medicine is not helping or if you have side effects  Tell him or her if you are allergic to any medicine  Keep a list of the medicines, vitamins, and herbs you take  Include the amounts, and when and why you take them  Bring the list or the pill bottles to follow-up visits  Carry your medicine list with you in case of an emergency  Self-care:   · Rinse your sinuses  Use a sinus rinse device to rinse your nasal passages with a saline (salt water) solution or distilled water  Do not use tap water  This will help thin the mucus in your nose and rinse away pollen and dirt  It will also help reduce swelling so you can breathe normally  Ask your healthcare provider how often to do this  · Breathe in steam   Heat a bowl of water until you see steam  Lean over the bowl and make a tent over your head with a large towel  Breathe deeply for about 20 minutes  Be careful not to get too close to the steam or burn yourself  Do this 3 times a day  You can also breathe deeply when you take a hot shower  · Sleep with your head elevated  Place an extra pillow under your head before you go to sleep to help your sinuses drain  · Drink liquids as directed  Ask your healthcare provider how much liquid to drink each day and which liquids are best for you  Liquids will thin the mucus in your nose and help it drain  Avoid drinks that contain alcohol or caffeine  · Do not smoke, and avoid secondhand smoke  Nicotine and other chemicals in cigarettes and cigars can make your symptoms worse  Ask your healthcare provider for information if you currently smoke and need help to quit  E-cigarettes or smokeless tobacco still contain nicotine  Talk to your healthcare provider before you use these products  Prevent the spread of germs that cause sinusitis:  Wash your hands often with soap and water   Wash your hands after you use the bathroom, change a child's diaper, or sneeze  Wash your hands before you prepare or eat food  Follow up with your healthcare provider as directed: You may be referred to an ear, nose, and throat specialist  Write down your questions so you remember to ask them during your visits  © 2017 [address] Information is for End User's use only and may not be sold, redistributed or otherwise used for commercial purposes  All illustrations and images included in CareNotes® are the copyrighted property of [employer]  or [employer]  The above information is an  only  It is not intended as medical advice for individual conditions or treatments  Talk to your doctor, nurse or pharmacist before following any medical regimen to see if it is safe and effective for you

## 2019-11-10 NOTE — PROGRESS NOTES
[facility]        NAME: Sydni Hewitt is a 64 y o  female  : 1960    MRN: [id number]  DATE: November 10, 2019  TIME: 9:27 AM    /75   Pulse 72   Temp 98 9 °F (37 2 °C)   Resp 16   Ht 5' (1 524 m)   Wt 91 9 kg (202 lb 9 6 oz)   SpO2 97%   BMI 39 57 kg/m²     Assessment and Plan   Acute non-recurrent sinusitis, unspecified location [J01 90]  1  Acute non-recurrent sinusitis, unspecified location  fluticasone (FLONASE) 50 mcg/act nasal spray    benzonatate (TESSALON PERLES) 100 mg capsule    azithromycin (ZITHROMAX) 250 mg tablet    albuterol (PROAIR HFA) 90 mcg/act inhaler   2  Bronchitis  fluticasone (FLONASE) 50 mcg/act nasal spray    benzonatate (TESSALON PERLES) 100 mg capsule    azithromycin (ZITHROMAX) 250 mg tablet    albuterol (PROAIR HFA) 90 mcg/act inhaler         Patient Instructions       Follow up with PCP in 3-5 days  Proceed to  ER if symptoms worsen  Chief Complaint     Chief Complaint   Patient presents with    Cold Like Symptoms     cough started on friday, runny nose, head pressure, chest congestion casing discomfort, and tired          History of Present Illness       Pt with sinus pressure congestion and coughing for 3-4 days     Sinusitis   This is a new problem  The current episode started in the past 7 days  The problem is unchanged  There has been no fever  Her pain is at a severity of 0/10  She is experiencing no pain  Associated symptoms include coughing, sinus pressure and a sore throat  Pertinent negatives include no chills, congestion, diaphoresis, ear pain, headaches, hoarse voice, neck pain, shortness of breath, sneezing or swollen glands  Past treatments include nothing  The treatment provided no relief  Review of Systems   Review of Systems   Constitutional: Negative  Negative for chills and diaphoresis  HENT: Positive for sinus pressure and sore throat  Negative for congestion, ear pain, hoarse voice and sneezing  Eyes: Negative  Respiratory: Positive for cough  Negative for shortness of breath  Cardiovascular: Negative  Gastrointestinal: Negative  Endocrine: Negative  Genitourinary: Negative  Musculoskeletal: Negative  Negative for neck pain  Skin: Negative  Allergic/Immunologic: Negative  Neurological: Negative  Negative for headaches  Hematological: Negative  Psychiatric/Behavioral: Negative  All other systems reviewed and are negative          Current Medications       Current Outpatient Medications:     ascorbic acid (VITAMIN C) 500 mg tablet, Take 500 mg by mouth daily, Disp: , Rfl:     atenolol (TENORMIN) 25 mg tablet, Take 1 tablet (25 mg total) by mouth daily, Disp: 90 tablet, Rfl: 3    Cholecalciferol (VITAMIN D-3) 1000 units CAPS, Take 2,000 Units by mouth daily, Disp: , Rfl:     ergocalciferol (VITAMIN D2) 50,000 units, Take by mouth, Disp: , Rfl:     hydroxychloroquine (PLAQUENIL) 200 mg tablet, Take by mouth, Disp: , Rfl:     Magnesium 400 MG CAPS, Take 4 capsules by mouth daily , Disp: , Rfl:     Probiotic Product (PROBIOTIC-10) CHEW, Chew, Disp: , Rfl:     quinapril (ACCUPRIL) 10 mg tablet, Take 1 tablet (10 mg total) by mouth daily, Disp: 14 tablet, Rfl: 1    SYNTHROID 112 MCG tablet, , Disp: , Rfl:     warfarin (COUMADIN) 2 5 mg tablet, Take 1 tablet by mouth daily, Disp: , Rfl:     albuterol (PROAIR HFA) 90 mcg/act inhaler, Inhale 2 puffs every 6 (six) hours as needed for wheezing, Disp: 8 5 g, Rfl: 0    azithromycin (ZITHROMAX) 250 mg tablet, Take 2 tablets today then 1 tablet daily x 4 days, Disp: 6 tablet, Rfl: 0    benzonatate (TESSALON PERLES) 100 mg capsule, Take 1 capsule (100 mg total) by mouth 3 (three) times a day as needed for cough, Disp: 20 capsule, Rfl: 0    fluticasone (FLONASE) 50 mcg/act nasal spray, 1 spray into each nostril daily, Disp: 1 Bottle, Rfl: 0    Current Allergies     Allergies as of 11/10/2019 - Reviewed 11/10/2019   Allergen Reaction Noted    Erythromycin  01/08/2015    Penicillins Delirium and Hives 06/14/2012    Percocet [oxycodone-acetaminophen] Nausea Only 04/24/2018            The following portions of the patient's history were reviewed and updated as appropriate: allergies, current medications, past family history, past medical history, past social history, past surgical history and problem list      Past Medical History:   Diagnosis Date    Disease of thyroid gland     History of ITP     History of transfusion     Hypertension     Sleep apnea        Past Surgical History:   Procedure Laterality Date    VALVE REPLACEMENT         Family History   Problem Relation Age of Onset    Diabetes Father     Other Father         Multiple myeloma     Coronary artery disease Maternal Grandmother     Diabetes Maternal Grandmother          Medications have been verified  Objective   /75   Pulse 72   Temp 98 9 °F (37 2 °C)   Resp 16   Ht 5' (1 524 m)   Wt 91 9 kg (202 lb 9 6 oz)   SpO2 97%   BMI 39 57 kg/m²        Physical Exam     Physical Exam   Constitutional: She is oriented to person, place, and time  She appears well-developed and well-nourished  HENT:   Head: Normocephalic and atraumatic  Right Ear: External ear normal    Left Ear: External ear normal    Mouth/Throat: Oropharynx is clear and moist    Yellow d/c nasal  Boggy nasal mucosa  Max sinus tender to palp    Eyes: Pupils are equal, round, and reactive to light  Conjunctivae and EOM are normal    Neck: Normal range of motion  Neck supple  Cardiovascular: Normal rate, regular rhythm and normal heart sounds  Pulmonary/Chest: Effort normal and breath sounds normal    Minor coarse sounds    Abdominal: Soft  Bowel sounds are normal    Musculoskeletal: Normal range of motion  Neurological: She is alert and oriented to person, place, and time  Skin: Skin is warm  Capillary refill takes less than 2 seconds  Psychiatric: She has a normal mood and affect   Her behavior is normal    Nursing note and vitals reviewed

## 2019-12-05 LAB — INR PPP: 2.7 (ref 0.84–1.19)

## 2019-12-06 ENCOUNTER — ANTICOAG VISIT (OUTPATIENT)
Dept: CARDIOLOGY CLINIC | Facility: CLINIC | Age: 59
End: 2019-12-06

## 2019-12-06 DIAGNOSIS — Z95.2 S/P MVR (MITRAL VALVE REPLACEMENT): ICD-10-CM

## 2020-01-09 ENCOUNTER — LAB REQUISITION (OUTPATIENT)
Dept: LAB | Facility: HOSPITAL | Age: 60
End: 2020-01-09
Payer: COMMERCIAL

## 2020-01-09 DIAGNOSIS — D68.62 LUPUS ANTICOAGULANT SYNDROME (HCC): ICD-10-CM

## 2020-01-09 DIAGNOSIS — D69.3 IMMUNE THROMBOCYTOPENIC PURPURA (HCC): ICD-10-CM

## 2020-01-09 DIAGNOSIS — D50.8 OTHER IRON DEFICIENCY ANEMIAS: ICD-10-CM

## 2020-01-09 LAB
CRP SERPL QL: 9.7 MG/L
ERYTHROCYTE [SEDIMENTATION RATE] IN BLOOD: 28 MM/HOUR (ref 0–20)
EST. AVERAGE GLUCOSE BLD GHB EST-MCNC: 134 MG/DL
HBA1C MFR BLD: 6.3 % (ref 4.2–6.3)
IGA SERPL-MCNC: 1670 MG/DL (ref 70–400)
IGG SERPL-MCNC: 921 MG/DL (ref 700–1600)
IGM SERPL-MCNC: 81 MG/DL (ref 40–230)
INR PPP: 2.7 (ref 0.84–1.19)

## 2020-01-09 PROCEDURE — 86334 IMMUNOFIX E-PHORESIS SERUM: CPT | Performed by: INTERNAL MEDICINE

## 2020-01-09 PROCEDURE — 85652 RBC SED RATE AUTOMATED: CPT | Performed by: INTERNAL MEDICINE

## 2020-01-09 PROCEDURE — 84155 ASSAY OF PROTEIN SERUM: CPT | Performed by: INTERNAL MEDICINE

## 2020-01-09 PROCEDURE — 84165 PROTEIN E-PHORESIS SERUM: CPT | Performed by: INTERNAL MEDICINE

## 2020-01-09 PROCEDURE — 82232 ASSAY OF BETA-2 PROTEIN: CPT | Performed by: INTERNAL MEDICINE

## 2020-01-09 PROCEDURE — 83036 HEMOGLOBIN GLYCOSYLATED A1C: CPT | Performed by: INTERNAL MEDICINE

## 2020-01-09 PROCEDURE — 82784 ASSAY IGA/IGD/IGG/IGM EACH: CPT | Performed by: INTERNAL MEDICINE

## 2020-01-09 PROCEDURE — 83883 ASSAY NEPHELOMETRY NOT SPEC: CPT | Performed by: INTERNAL MEDICINE

## 2020-01-09 PROCEDURE — 86140 C-REACTIVE PROTEIN: CPT | Performed by: INTERNAL MEDICINE

## 2020-01-10 LAB
KAPPA LC FREE SER-MCNC: 29.5 MG/L (ref 3.3–19.4)
KAPPA LC FREE/LAMBDA FREE SER: 1.17 {RATIO} (ref 0.26–1.65)
LAMBDA LC FREE SERPL-MCNC: 25.2 MG/L (ref 5.7–26.3)

## 2020-01-13 ENCOUNTER — ANTICOAG VISIT (OUTPATIENT)
Dept: CARDIOLOGY CLINIC | Facility: CLINIC | Age: 60
End: 2020-01-13

## 2020-01-13 DIAGNOSIS — Z95.2 S/P MVR (MITRAL VALVE REPLACEMENT): ICD-10-CM

## 2020-01-13 LAB — B2 MICROGLOB SERPL-MCNC: 3.8 MG/L (ref 0.6–2.4)

## 2020-01-21 LAB — MISCELLANEOUS LAB TEST RESULT: NORMAL

## 2020-02-03 ENCOUNTER — OFFICE VISIT (OUTPATIENT)
Dept: FAMILY MEDICINE CLINIC | Facility: CLINIC | Age: 60
End: 2020-02-03
Payer: COMMERCIAL

## 2020-02-03 VITALS
SYSTOLIC BLOOD PRESSURE: 142 MMHG | HEART RATE: 80 BPM | RESPIRATION RATE: 16 BRPM | WEIGHT: 208 LBS | BODY MASS INDEX: 40.84 KG/M2 | DIASTOLIC BLOOD PRESSURE: 80 MMHG | TEMPERATURE: 100 F | HEIGHT: 60 IN

## 2020-02-03 DIAGNOSIS — J20.9 ACUTE BRONCHITIS, UNSPECIFIED ORGANISM: Primary | ICD-10-CM

## 2020-02-03 PROCEDURE — 99213 OFFICE O/P EST LOW 20 MIN: CPT | Performed by: FAMILY MEDICINE

## 2020-02-03 PROCEDURE — 3079F DIAST BP 80-89 MM HG: CPT | Performed by: FAMILY MEDICINE

## 2020-02-03 PROCEDURE — 3008F BODY MASS INDEX DOCD: CPT | Performed by: FAMILY MEDICINE

## 2020-02-03 PROCEDURE — 3077F SYST BP >= 140 MM HG: CPT | Performed by: FAMILY MEDICINE

## 2020-02-03 PROCEDURE — 1036F TOBACCO NON-USER: CPT | Performed by: FAMILY MEDICINE

## 2020-02-03 RX ORDER — PREDNISONE 1 MG/1
1 TABLET ORAL DAILY
COMMUNITY
Start: 2020-01-14

## 2020-02-03 RX ORDER — AZITHROMYCIN 250 MG/1
TABLET, FILM COATED ORAL
Qty: 6 TABLET | Refills: 0 | Status: SHIPPED | OUTPATIENT
Start: 2020-02-03 | End: 2020-02-07

## 2020-02-03 NOTE — PROGRESS NOTES
Assessment/Plan:  Patient will be started on a Z-Jere and recommend Robitussin DM sue Dickson Patient may take Tylenol p r n  Recommend increase fluids and rest   Return the office in 1 week or call sooner sue Dickson Diagnoses and all orders for this visit:    Acute bronchitis, unspecified organism  -     azithromycin (ZITHROMAX) 250 mg tablet; Take 2 tablets today then 1 tablet daily x 4 days    Other orders  -     predniSONE 5 mg tablet; Take 1 tablet by mouth daily          Subjective:      Patient ID: Star Jauregui is a 64 y o  female  Patient started 2 days ago with cold symptoms  She complains of nasal congestion and nonproductive cough  She complains of headache and low-grade fever but denies body aches  She has treated this with OTC cold medications with some relief  Patient states she tolerates Z-Jere well  URI    This is a new problem  The current episode started in the past 7 days  The problem has been gradually worsening  Associated symptoms include congestion, coughing, headaches, a plugged ear sensation and rhinorrhea  Pertinent negatives include no diarrhea, ear pain, nausea, sinus pain, sneezing, sore throat, vomiting or wheezing  She has tried increased fluids (OTC cold meds) for the symptoms  The treatment provided mild relief  The following portions of the patient's history were reviewed and updated as appropriate: allergies, current medications, past family history, past medical history, past social history, past surgical history and problem list     Review of Systems   HENT: Positive for congestion and rhinorrhea  Negative for ear pain, sinus pain, sneezing and sore throat  Respiratory: Positive for cough  Negative for wheezing  Gastrointestinal: Negative for diarrhea, nausea and vomiting  Neurological: Positive for headaches           Objective:      /80 (BP Location: Left arm, Patient Position: Sitting, Cuff Size: Standard)   Pulse 80   Temp 100 °F (37 8 °C) (Tympanic)   Resp 16   Ht 5' (1 524 m)   Wt 94 3 kg (208 lb)   BMI 40 62 kg/m²          Physical Exam   Constitutional: She is oriented to person, place, and time  She appears well-developed and well-nourished  No distress  HENT:   Head: Normocephalic  Right Ear: External ear normal    Left Ear: External ear normal    Turbinates swelling with mucoid drainage  Throat postnasal drainage and injected  Mucous membranes moist    Eyes: Conjunctivae are normal  No scleral icterus  Neck: Neck supple  Cardiovascular: Normal rate and regular rhythm  Pulmonary/Chest: Effort normal and breath sounds normal  No respiratory distress  She has no wheezes  Abdominal: Soft  There is no tenderness  Musculoskeletal: She exhibits no edema  Lymphadenopathy:     She has no cervical adenopathy  Neurological: She is alert and oriented to person, place, and time  Skin: Skin is warm and dry  Psychiatric: She has a normal mood and affect  BMI Counseling: Body mass index is 40 62 kg/m²  The BMI is above normal  Nutrition recommendations include decreasing overall calorie intake, 3-5 servings of fruits/vegetables daily, reducing fast food intake, consuming healthier snacks, decreasing soda and/or juice intake, moderation in carbohydrate intake and reducing intake of saturated fat and trans fat  Exercise recommendations include moderate aerobic physical activity for 150 minutes/week

## 2020-03-02 ENCOUNTER — ANTICOAG VISIT (OUTPATIENT)
Dept: CARDIOLOGY CLINIC | Facility: CLINIC | Age: 60
End: 2020-03-02

## 2020-03-02 DIAGNOSIS — Z95.2 S/P MVR (MITRAL VALVE REPLACEMENT): ICD-10-CM

## 2020-03-02 LAB — INR PPP: 3 (ref 0.84–1.19)

## 2020-03-03 DIAGNOSIS — Z95.2 S/P MVR (MITRAL VALVE REPLACEMENT): Primary | ICD-10-CM

## 2020-03-04 ENCOUNTER — OFFICE VISIT (OUTPATIENT)
Dept: CARDIOLOGY CLINIC | Facility: CLINIC | Age: 60
End: 2020-03-04
Payer: COMMERCIAL

## 2020-03-04 VITALS
HEART RATE: 60 BPM | SYSTOLIC BLOOD PRESSURE: 140 MMHG | BODY MASS INDEX: 40.6 KG/M2 | HEIGHT: 60 IN | DIASTOLIC BLOOD PRESSURE: 88 MMHG | WEIGHT: 206.8 LBS

## 2020-03-04 DIAGNOSIS — I10 ESSENTIAL HYPERTENSION: Primary | ICD-10-CM

## 2020-03-04 DIAGNOSIS — I34.1 MVP (MITRAL VALVE PROLAPSE): ICD-10-CM

## 2020-03-04 DIAGNOSIS — Z95.2 S/P MVR (MITRAL VALVE REPLACEMENT): ICD-10-CM

## 2020-03-04 PROCEDURE — 93000 ELECTROCARDIOGRAM COMPLETE: CPT | Performed by: INTERNAL MEDICINE

## 2020-03-04 PROCEDURE — 1036F TOBACCO NON-USER: CPT | Performed by: INTERNAL MEDICINE

## 2020-03-04 PROCEDURE — 3077F SYST BP >= 140 MM HG: CPT | Performed by: INTERNAL MEDICINE

## 2020-03-04 PROCEDURE — 3008F BODY MASS INDEX DOCD: CPT | Performed by: INTERNAL MEDICINE

## 2020-03-04 PROCEDURE — 3079F DIAST BP 80-89 MM HG: CPT | Performed by: INTERNAL MEDICINE

## 2020-03-04 PROCEDURE — 99214 OFFICE O/P EST MOD 30 MIN: CPT | Performed by: INTERNAL MEDICINE

## 2020-03-04 NOTE — PATIENT INSTRUCTIONS
Recommendations:  1  Continue current medications  2  Check BP several times/week  If BP consistently above 140, call office    3  Follow up in 6 months

## 2020-03-04 NOTE — PROGRESS NOTES
Cardiology   Natalie Flowers 64 y o  female MRN: [id number]        Impression:  1  S/P mechanical MVR - doing well  On Warfarin  2  ITP - will discontinue ASA  Patient has no CAD  3  Hypertension - borderline control, but currently taking prednisone       Recommendations:  1  Continue current medications  2  Check BP several times/week  If BP consistently above 140, call office  3  Follow up in 6 months      HPI: Natalie Flowers is a 64y o  year old female with a history of mechancial MVR, HTN, SLE, ITP, who presents for follow up  Echocardiogram 4/19 demonstrated normal LV systolic function with normal MVR  Asymptomatic from a cardiac standpoint  No chest pain, shortness of breath, or palpitations  Review of Systems   Constitutional: Negative  HENT: Negative  Eyes: Negative  Respiratory: Negative for chest tightness and shortness of breath  Cardiovascular: Negative for chest pain, palpitations and leg swelling  Gastrointestinal: Negative  Endocrine: Negative  Genitourinary: Negative  Musculoskeletal: Negative  Skin: Negative  Allergic/Immunologic: Negative  Neurological: Negative  Hematological: Negative  Psychiatric/Behavioral: Negative  All other systems reviewed and are negative          Past Medical History:   Diagnosis Date    Disease of thyroid gland     History of ITP     History of transfusion     Hypertension     Sleep apnea      Past Surgical History:   Procedure Laterality Date    VALVE REPLACEMENT       Social History     Substance and Sexual Activity   Alcohol Use Yes    Comment: Social     Social History     Substance and Sexual Activity   Drug Use No     Social History     Tobacco Use   Smoking Status Never Smoker   Smokeless Tobacco Never Used     Family History   Problem Relation Age of Onset    Diabetes Father     Other Father         Multiple myeloma     Coronary artery disease Maternal Grandmother     Diabetes Maternal Grandmother        Allergies: Allergies   Allergen Reactions    Erythromycin     Penicillins Delirium and Hives    Percocet [Oxycodone-Acetaminophen] Nausea Only       Medications:     Current Outpatient Medications:     ascorbic acid (VITAMIN C) 500 mg tablet, Take 500 mg by mouth daily, Disp: , Rfl:     atenolol (TENORMIN) 25 mg tablet, Take 1 tablet (25 mg total) by mouth daily, Disp: 90 tablet, Rfl: 3    Cholecalciferol (VITAMIN D-3) 1000 units CAPS, Take 2,000 Units by mouth daily, Disp: , Rfl:     ergocalciferol (VITAMIN D2) 50,000 units, Take by mouth once a week , Disp: , Rfl:     hydroxychloroquine (PLAQUENIL) 200 mg tablet, Take by mouth, Disp: , Rfl:     Magnesium 400 MG CAPS, Take 4 capsules by mouth daily , Disp: , Rfl:     predniSONE 5 mg tablet, Take 1 tablet by mouth daily, Disp: , Rfl:     Probiotic Product (PROBIOTIC-10) CHEW, Chew, Disp: , Rfl:     quinapril (ACCUPRIL) 10 mg tablet, Take 1 tablet (10 mg total) by mouth daily, Disp: 14 tablet, Rfl: 1    SYNTHROID 112 MCG tablet, , Disp: , Rfl:     warfarin (COUMADIN) 2 5 mg tablet, Take 1 tablet by mouth daily, Disp: , Rfl:       Wt Readings from Last 3 Encounters:   03/04/20 93 8 kg (206 lb 12 8 oz)   02/03/20 94 3 kg (208 lb)   11/10/19 91 9 kg (202 lb 9 6 oz)     Temp Readings from Last 3 Encounters:   02/03/20 100 °F (37 8 °C) (Tympanic)   11/10/19 98 9 °F (37 2 °C)   05/22/19 98 °F (36 7 °C) (Temporal)     BP Readings from Last 3 Encounters:   03/04/20 140/88   02/03/20 142/80   11/10/19 160/75     Pulse Readings from Last 3 Encounters:   03/04/20 60   02/03/20 80   11/10/19 72         Physical Exam   Constitutional: She is oriented to person, place, and time  She appears well-developed  HENT:   Head: Atraumatic  Eyes: EOM are normal    Neck: Normal range of motion  Cardiovascular: Normal rate, regular rhythm and normal heart sounds     Mechanical heart sounds   Pulmonary/Chest: Effort normal and breath sounds normal  Abdominal: Soft  Musculoskeletal: Normal range of motion  Neurological: She is alert and oriented to person, place, and time  Skin: Skin is warm and dry  Psychiatric: She has a normal mood and affect  Cardiac testing:   EKG reviewed personally: NSR 60 Nml  Results for orders placed during the hospital encounter of 19   Echo complete with contrast if indicated    Narrative [address]  [facility], [address] [facility]  [phone]    Transthoracic Echocardiogram  2D, M-mode, Doppler, and Color Doppler    Study date:  2019    Patient: Jimbo Patterson  MR number: [id number]  Account number: [de-identified]  : 1960  Age: 66 years  Gender: Female  Status: Outpatient  Location: [address] [place] [address] [facility]  Height: 61 in  Weight: 214 lb  BP: 130/ 78 mmHg    Indications: Mitral valve replacement    Diagnoses: I34 1 - Nonrheumatic mitral (valve) prolapse, I34 9 - Nonrheumatic mitral valve disorder, unspecified    Sonographer:  SHRADDHA Aguilar  Primary Physician:  Luis Seaman DO  Referring Physician:  Cristian Adamson MD  Group:  [id number]  Interpreting Physician:  Marian Cash MD    SUMMARY    LEFT VENTRICLE:  Systolic function was normal  Ejection fraction was estimated in the range of 55 % to 65 %  There were no regional wall motion abnormalities  MITRAL VALVE:  A mechanical prosthesis was present  It exhibited normal function  TRICUSPID VALVE:  There was trace regurgitation  PULMONIC VALVE:  There was trace regurgitation  HISTORY: PRIOR HISTORY: Hypertension, mechanical MVR, sleep apnea, COPD, hypothyroidism    PROCEDURE: The study was performed in the [address] [facility]  This was a routine study  The transthoracic approach was used  The study included complete 2D imaging, M-mode, complete spectral Doppler, and color Doppler  Image  quality was adequate      LEFT VENTRICLE: Size was normal  Systolic function was normal  Ejection fraction was estimated in the range of 55 % to 65 %  There were no regional wall motion abnormalities  Wall thickness was normal  DOPPLER: The study was not  technically sufficient to allow evaluation of LV diastolic function  RIGHT VENTRICLE: The size was normal  Systolic function was normal  Wall thickness was normal     LEFT ATRIUM: Size was at the upper limits of normal     RIGHT ATRIUM: Size was normal     MITRAL VALVE: A mechanical prosthesis was present  It exhibited normal function  AORTIC VALVE: The valve was trileaflet  Leaflets exhibited normal thickness and normal cuspal separation  DOPPLER: Transaortic velocity was within the normal range  There was no evidence for stenosis  There was no regurgitation  TRICUSPID VALVE: DOPPLER: There was trace regurgitation  PULMONIC VALVE: DOPPLER: There was trace regurgitation  PERICARDIUM: There was no pericardial effusion  The pericardium was normal in appearance  AORTA: The root exhibited normal size      SYSTEM MEASUREMENT TABLES    2D  %FS: 34 1 %  Ao Diam: 3 18 cm  EDV(Teich): 132 42 ml  EF(Cube): 71 38 %  EF(Teich): 62 63 %  ESV(Cube): 41 43 ml  ESV(Teich): 49 49 ml  IVSd: 1 04 cm  LA Area: 19 96 cm2  LA Diam: 3 84 cm  LVEDV MOD A4C: 136 81 ml  LVEF MOD A4C: 59 35 %  LVESV MOD A4C: 55 62 ml  LVIDd: 5 25 cm  LVIDs: 3 46 cm  LVLd A4C: 7 38 cm  LVLs A4C: 6 19 cm  LVPWd: 1 03 cm  RA Area: 16 15 cm2  RV Diam : 4 18 cm  SV MOD A4C: 81 19 ml  SV(Cube): 103 3 ml  SV(Teich): 82 94 ml    CW  MV VTI: 42 17 cm  MV Vmax: 1 53 m/s  MV Vmean: 0 86 m/s  MV maxP 39 mmHg  MV meanPG: 3 56 mmHg  TR Vmax: 2 64 m/s  TR maxP 79 mmHg    MM  TAPSE: 2 24 cm    PW  E': 0 06 m/s  E/E': 28 44  MV A Bobby: 0 98 m/s  MV Dec [place]: 5 06 m/s2  MV DecT: 338 77 ms  MV E Bobby: 1 71 m/s  MV E/A Ratio: 1 75    [employer] Commission Accredited Echocardiography Laboratory    Prepared and electronically signed by    Anjali Bates Skylar Sunshine MD  Signed 10-Apr-2019 08:44:29

## 2020-03-07 ENCOUNTER — HOSPITAL ENCOUNTER (OUTPATIENT)
Dept: RADIOLOGY | Facility: HOSPITAL | Age: 60
Discharge: HOME | End: 2020-03-07
Attending: INTERNAL MEDICINE
Payer: COMMERCIAL

## 2020-03-07 ENCOUNTER — TRANSCRIBE ORDERS (OUTPATIENT)
Dept: ADMINISTRATIVE | Facility: HOSPITAL | Age: 60
End: 2020-03-07

## 2020-03-07 DIAGNOSIS — D47.2 MGUS (MONOCLONAL GAMMOPATHY OF UNKNOWN SIGNIFICANCE): ICD-10-CM

## 2020-03-07 DIAGNOSIS — D47.2 MGUS (MONOCLONAL GAMMOPATHY OF UNKNOWN SIGNIFICANCE): Primary | ICD-10-CM

## 2020-03-07 PROCEDURE — 77075 RADEX OSSEOUS SURVEY COMPL: CPT

## 2020-03-20 DIAGNOSIS — I10 ESSENTIAL HYPERTENSION: Primary | ICD-10-CM

## 2020-03-20 RX ORDER — QUINAPRIL 5 1/1
TABLET ORAL
Qty: 180 TABLET | Refills: 3 | Status: SHIPPED | OUTPATIENT
Start: 2020-03-20 | End: 2020-03-24

## 2020-03-24 DIAGNOSIS — I10 ESSENTIAL HYPERTENSION: ICD-10-CM

## 2020-03-24 RX ORDER — QUINAPRIL 5 1/1
10 TABLET ORAL DAILY
Qty: 180 TABLET | Refills: 3 | Status: SHIPPED | OUTPATIENT
Start: 2020-03-24 | End: 2020-07-10 | Stop reason: SDUPTHER

## 2020-03-24 RX ORDER — ATENOLOL 25 MG/1
25 TABLET ORAL DAILY
Qty: 90 TABLET | Refills: 3 | Status: SHIPPED | OUTPATIENT
Start: 2020-03-24 | End: 2020-07-10 | Stop reason: SDUPTHER

## 2020-03-27 ENCOUNTER — TRANSCRIBE ORDERS (OUTPATIENT)
Dept: ADMINISTRATIVE | Facility: HOSPITAL | Age: 60
End: 2020-03-27

## 2020-03-27 DIAGNOSIS — D50.8 IRON DEFICIENCY ANEMIA SECONDARY TO INADEQUATE DIETARY IRON INTAKE: Primary | ICD-10-CM

## 2020-03-27 DIAGNOSIS — D68.62 LUPUS ANTICOAGULANT DISORDER (HCC): ICD-10-CM

## 2020-04-08 LAB — INR PPP: 3.5 (ref 0.84–1.19)

## 2020-04-09 ENCOUNTER — ANTICOAG VISIT (OUTPATIENT)
Dept: CARDIOLOGY CLINIC | Facility: CLINIC | Age: 60
End: 2020-04-09

## 2020-04-09 DIAGNOSIS — Z95.2 S/P MVR (MITRAL VALVE REPLACEMENT): ICD-10-CM

## 2020-04-24 LAB — INR PPP: 2.4 (ref 0.84–1.19)

## 2020-04-27 ENCOUNTER — ANTICOAG VISIT (OUTPATIENT)
Dept: CARDIOLOGY CLINIC | Facility: CLINIC | Age: 60
End: 2020-04-27

## 2020-04-27 DIAGNOSIS — Z95.2 S/P MVR (MITRAL VALVE REPLACEMENT): ICD-10-CM

## 2020-05-22 ENCOUNTER — HOSPITAL ENCOUNTER (OUTPATIENT)
Dept: RADIOLOGY | Facility: HOSPITAL | Age: 60
Discharge: HOME | End: 2020-05-22
Attending: INTERNAL MEDICINE
Payer: COMMERCIAL

## 2020-05-22 DIAGNOSIS — D68.62 LUPUS ANTICOAGULANT DISORDER (HCC): ICD-10-CM

## 2020-05-22 DIAGNOSIS — D50.8 IRON DEFICIENCY ANEMIA SECONDARY TO INADEQUATE DIETARY IRON INTAKE: ICD-10-CM

## 2020-05-22 PROCEDURE — 73218 MRI UPPER EXTREMITY W/O DYE: CPT

## 2020-06-10 LAB — INR PPP: 2.5 (ref 0.84–1.19)

## 2020-06-11 ENCOUNTER — TRANSCRIBE ORDERS (OUTPATIENT)
Dept: ADMINISTRATIVE | Facility: HOSPITAL | Age: 60
End: 2020-06-11

## 2020-06-11 DIAGNOSIS — M81.0 OSTEOPOROSIS, UNSPECIFIED OSTEOPOROSIS TYPE, UNSPECIFIED PATHOLOGICAL FRACTURE PRESENCE: Primary | ICD-10-CM

## 2020-06-15 ENCOUNTER — HOSPITAL ENCOUNTER (OUTPATIENT)
Dept: BONE DENSITY | Facility: MEDICAL CENTER | Age: 60
Discharge: HOME | End: 2020-06-15
Payer: COMMERCIAL

## 2020-06-15 DIAGNOSIS — M81.0 OSTEOPOROSIS, UNSPECIFIED OSTEOPOROSIS TYPE, UNSPECIFIED PATHOLOGICAL FRACTURE PRESENCE: ICD-10-CM

## 2020-06-15 PROCEDURE — 77080 DXA BONE DENSITY AXIAL: CPT

## 2020-06-17 ENCOUNTER — ANTICOAG VISIT (OUTPATIENT)
Dept: CARDIOLOGY CLINIC | Facility: CLINIC | Age: 60
End: 2020-06-17

## 2020-06-17 DIAGNOSIS — Z95.2 S/P MVR (MITRAL VALVE REPLACEMENT): ICD-10-CM

## 2020-06-23 ENCOUNTER — ANTICOAG VISIT (OUTPATIENT)
Dept: CARDIOLOGY CLINIC | Facility: CLINIC | Age: 60
End: 2020-06-23

## 2020-06-23 DIAGNOSIS — Z95.2 S/P MVR (MITRAL VALVE REPLACEMENT): ICD-10-CM

## 2020-06-23 LAB — INR PPP: 3 (ref 0.84–1.19)

## 2020-07-03 DIAGNOSIS — Z01.812 PRE-PROCEDURE LAB EXAM: ICD-10-CM

## 2020-07-03 PROCEDURE — U0003 INFECTIOUS AGENT DETECTION BY NUCLEIC ACID (DNA OR RNA); SEVERE ACUTE RESPIRATORY SYNDROME CORONAVIRUS 2 (SARS-COV-2) (CORONAVIRUS DISEASE [COVID-19]), AMPLIFIED PROBE TECHNIQUE, MAKING USE OF HIGH THROUGHPUT TECHNOLOGIES AS DESCRIBED BY CMS-2020-01-R: HCPCS

## 2020-07-09 LAB — SARS-COV-2 RNA SPEC QL NAA+PROBE: NOT DETECTED

## 2020-07-09 PROCEDURE — 88305 TISSUE EXAM BY PATHOLOGIST: CPT | Performed by: PATHOLOGY

## 2020-07-10 ENCOUNTER — LAB REQUISITION (OUTPATIENT)
Dept: LAB | Facility: HOSPITAL | Age: 60
End: 2020-07-10
Payer: COMMERCIAL

## 2020-07-10 DIAGNOSIS — Z86.010 PERSONAL HISTORY OF COLONIC POLYPS: ICD-10-CM

## 2020-07-10 DIAGNOSIS — I10 ESSENTIAL HYPERTENSION: ICD-10-CM

## 2020-07-10 RX ORDER — QUINAPRIL 5 1/1
10 TABLET ORAL DAILY
Qty: 180 TABLET | Refills: 3 | Status: SHIPPED | OUTPATIENT
Start: 2020-07-10 | End: 2021-06-06

## 2020-07-10 RX ORDER — ATENOLOL 25 MG/1
25 TABLET ORAL DAILY
Qty: 90 TABLET | Refills: 3 | Status: SHIPPED | OUTPATIENT
Start: 2020-07-10 | End: 2021-06-06

## 2020-07-13 LAB — INR PPP: 2.5 (ref 0.84–1.19)

## 2020-07-14 ENCOUNTER — ANTICOAG VISIT (OUTPATIENT)
Dept: CARDIOLOGY CLINIC | Facility: CLINIC | Age: 60
End: 2020-07-14

## 2020-07-14 DIAGNOSIS — Z95.2 S/P MVR (MITRAL VALVE REPLACEMENT): ICD-10-CM

## 2020-07-29 ENCOUNTER — ANTICOAG VISIT (OUTPATIENT)
Dept: CARDIOLOGY CLINIC | Facility: CLINIC | Age: 60
End: 2020-07-29

## 2020-07-29 DIAGNOSIS — Z95.2 S/P MVR (MITRAL VALVE REPLACEMENT): ICD-10-CM

## 2020-07-29 LAB — INR PPP: 2.35 (ref 0.84–1.19)

## 2020-08-04 ENCOUNTER — LAB REQUISITION (OUTPATIENT)
Dept: LAB | Facility: HOSPITAL | Age: 60
End: 2020-08-04
Payer: COMMERCIAL

## 2020-08-04 DIAGNOSIS — D69.3 IMMUNE THROMBOCYTOPENIC PURPURA (HCC): ICD-10-CM

## 2020-08-04 DIAGNOSIS — M06.9 RHEUMATOID ARTHRITIS, UNSPECIFIED (HCC): ICD-10-CM

## 2020-08-04 LAB
BILIRUB UR QL STRIP: NEGATIVE
CLARITY UR: CLEAR
COLOR UR: YELLOW
GLUCOSE UR STRIP-MCNC: NEGATIVE MG/DL
HBV CORE AB SER QL: NORMAL
HCV AB SER QL: NORMAL
HGB UR QL STRIP.AUTO: NEGATIVE
KETONES UR STRIP-MCNC: NEGATIVE MG/DL
LEUKOCYTE ESTERASE UR QL STRIP: NEGATIVE
NITRITE UR QL STRIP: NEGATIVE
PH UR STRIP.AUTO: 6 [PH]
PROT UR STRIP-MCNC: NEGATIVE MG/DL
SP GR UR STRIP.AUTO: 1.01 (ref 1–1.03)
UROBILINOGEN UR QL STRIP.AUTO: 0.2 E.U./DL

## 2020-08-04 PROCEDURE — 86704 HEP B CORE ANTIBODY TOTAL: CPT | Performed by: INTERNAL MEDICINE

## 2020-08-04 PROCEDURE — 86803 HEPATITIS C AB TEST: CPT | Performed by: INTERNAL MEDICINE

## 2020-08-04 PROCEDURE — 81003 URINALYSIS AUTO W/O SCOPE: CPT | Performed by: INTERNAL MEDICINE

## 2020-08-17 ENCOUNTER — ANTICOAG VISIT (OUTPATIENT)
Dept: CARDIOLOGY CLINIC | Facility: CLINIC | Age: 60
End: 2020-08-17

## 2020-08-17 DIAGNOSIS — Z95.2 S/P MVR (MITRAL VALVE REPLACEMENT): ICD-10-CM

## 2020-08-17 LAB
INR PPP: 1.05 (ref 0.84–1.19)
INR PPP: 4.05 (ref 0.84–1.19)

## 2020-08-27 ENCOUNTER — ANTICOAG VISIT (OUTPATIENT)
Dept: CARDIOLOGY CLINIC | Facility: CLINIC | Age: 60
End: 2020-08-27

## 2020-08-27 ENCOUNTER — LAB REQUISITION (OUTPATIENT)
Dept: LAB | Facility: HOSPITAL | Age: 60
End: 2020-08-27
Payer: COMMERCIAL

## 2020-08-27 ENCOUNTER — APPOINTMENT (OUTPATIENT)
Dept: LAB | Facility: HOSPITAL | Age: 60
End: 2020-08-27
Payer: COMMERCIAL

## 2020-08-27 DIAGNOSIS — D69.3 IMMUNE THROMBOCYTOPENIC PURPURA (HCC): ICD-10-CM

## 2020-08-27 DIAGNOSIS — D68.62 LUPUS ANTICOAGULANT SYNDROME (HCC): ICD-10-CM

## 2020-08-27 DIAGNOSIS — Z95.2 S/P MVR (MITRAL VALVE REPLACEMENT): ICD-10-CM

## 2020-08-27 DIAGNOSIS — D50.8 OTHER IRON DEFICIENCY ANEMIAS: ICD-10-CM

## 2020-08-28 ENCOUNTER — ANTICOAG VISIT (OUTPATIENT)
Dept: CARDIOLOGY CLINIC | Facility: CLINIC | Age: 60
End: 2020-08-28

## 2020-08-28 DIAGNOSIS — Z95.2 S/P MVR (MITRAL VALVE REPLACEMENT): ICD-10-CM

## 2020-08-28 LAB — INR PPP: 4.95 (ref 0.84–1.19)

## 2020-08-31 ENCOUNTER — ANTICOAG VISIT (OUTPATIENT)
Dept: CARDIOLOGY CLINIC | Facility: CLINIC | Age: 60
End: 2020-08-31

## 2020-08-31 DIAGNOSIS — Z95.2 S/P MVR (MITRAL VALVE REPLACEMENT): ICD-10-CM

## 2020-08-31 LAB — INR PPP: 2.7 (ref 0.84–1.19)

## 2020-09-03 ENCOUNTER — ANTICOAG VISIT (OUTPATIENT)
Dept: CARDIOLOGY CLINIC | Facility: CLINIC | Age: 60
End: 2020-09-03

## 2020-09-03 DIAGNOSIS — Z95.2 S/P MVR (MITRAL VALVE REPLACEMENT): ICD-10-CM

## 2020-09-03 LAB — INR PPP: 3.9 (ref 0.84–1.19)

## 2020-09-09 ENCOUNTER — ANTICOAG VISIT (OUTPATIENT)
Dept: CARDIOLOGY CLINIC | Facility: CLINIC | Age: 60
End: 2020-09-09

## 2020-09-09 DIAGNOSIS — Z95.2 S/P MVR (MITRAL VALVE REPLACEMENT): ICD-10-CM

## 2020-09-09 LAB — INR PPP: 2.42 (ref 0.84–1.19)

## 2020-09-17 ENCOUNTER — ANTICOAG VISIT (OUTPATIENT)
Dept: CARDIOLOGY CLINIC | Facility: CLINIC | Age: 60
End: 2020-09-17

## 2020-09-17 DIAGNOSIS — Z95.2 S/P MVR (MITRAL VALVE REPLACEMENT): ICD-10-CM

## 2020-09-17 LAB — INR PPP: 3.4 (ref 0.84–1.19)

## 2020-09-21 ENCOUNTER — OFFICE VISIT (OUTPATIENT)
Dept: CARDIOLOGY CLINIC | Facility: CLINIC | Age: 60
End: 2020-09-21
Payer: COMMERCIAL

## 2020-09-21 VITALS
DIASTOLIC BLOOD PRESSURE: 74 MMHG | BODY MASS INDEX: 42.68 KG/M2 | WEIGHT: 217.4 LBS | HEART RATE: 64 BPM | HEIGHT: 60 IN | TEMPERATURE: 97.8 F | SYSTOLIC BLOOD PRESSURE: 126 MMHG

## 2020-09-21 DIAGNOSIS — I10 ESSENTIAL HYPERTENSION: Primary | ICD-10-CM

## 2020-09-21 DIAGNOSIS — Z95.2 S/P MVR (MITRAL VALVE REPLACEMENT): ICD-10-CM

## 2020-09-21 DIAGNOSIS — I34.1 MVP (MITRAL VALVE PROLAPSE): ICD-10-CM

## 2020-09-21 PROCEDURE — 99214 OFFICE O/P EST MOD 30 MIN: CPT | Performed by: INTERNAL MEDICINE

## 2020-09-21 PROCEDURE — 3078F DIAST BP <80 MM HG: CPT | Performed by: INTERNAL MEDICINE

## 2020-09-21 PROCEDURE — 1036F TOBACCO NON-USER: CPT | Performed by: INTERNAL MEDICINE

## 2020-09-21 RX ORDER — PREDNISONE 10 MG/1
5 TABLET ORAL
COMMUNITY
Start: 2020-08-20 | End: 2021-05-10

## 2020-09-21 NOTE — PROGRESS NOTES
Cardiology   Lauren Winn 64 y o  female MRN: [id number]        Impression:  1  S/P mechanical MVR - doing well   On Warfarin  2  ITP - will discontinue ASA   Patient has no CAD  3  Hypertension - controlled       Recommendations:  1  Continue current medications  2  Follow up in 6 months        HPI: Lauren Winn is a 64y o  year old female with a history of mechancial MVR, HTN, SLE, ITP, who presents for follow up   Echocardiogram 4/19 demonstrated normal LV systolic function with normal MVR  Was being treated for Lyme over the summer with prednisone  Has been feeling well since  No chest pain, shortness of breath, or palpitations  Review of Systems   Constitutional: Negative  HENT: Negative  Eyes: Negative  Respiratory: Negative for chest tightness and shortness of breath  Cardiovascular: Negative for chest pain, palpitations and leg swelling  Gastrointestinal: Negative  Endocrine: Negative  Genitourinary: Negative  Musculoskeletal: Negative  Skin: Negative  Allergic/Immunologic: Negative  Neurological: Negative  Hematological: Negative  Psychiatric/Behavioral: Negative  All other systems reviewed and are negative  Past Medical History:   Diagnosis Date    Disease of thyroid gland     History of ITP     History of transfusion     Hypertension     Sleep apnea      Past Surgical History:   Procedure Laterality Date    VALVE REPLACEMENT       Social History     Substance and Sexual Activity   Alcohol Use Yes    Comment: Social     Social History     Substance and Sexual Activity   Drug Use No     Social History     Tobacco Use   Smoking Status Never Smoker   Smokeless Tobacco Never Used     Family History   Problem Relation Age of Onset    Diabetes Father     Other Father         Multiple myeloma     Coronary artery disease Maternal Grandmother     Diabetes Maternal Grandmother        Allergies:   Allergies   Allergen Reactions    Erythromycin     Penicillins Delirium and Hives    Percocet [Oxycodone-Acetaminophen] Nausea Only       Medications:     Current Outpatient Medications:     ascorbic acid (VITAMIN C) 500 mg tablet, Take 500 mg by mouth daily, Disp: , Rfl:     atenolol (TENORMIN) 25 mg tablet, Take 1 tablet (25 mg total) by mouth daily, Disp: 90 tablet, Rfl: 3    Cholecalciferol (VITAMIN D-3) 1000 units CAPS, Take 2,000 Units by mouth daily, Disp: , Rfl:     ergocalciferol (VITAMIN D2) 50,000 units, Take by mouth once a week , Disp: , Rfl:     hydroxychloroquine (PLAQUENIL) 200 mg tablet, Take by mouth, Disp: , Rfl:     Magnesium 400 MG CAPS, Take 4 capsules by mouth daily , Disp: , Rfl:     predniSONE 5 mg tablet, Take 1 tablet by mouth daily, Disp: , Rfl:     Probiotic Product (PROBIOTIC-10) CHEW, Chew, Disp: , Rfl:     quinapril (ACCUPRIL) 5 mg tablet, Take 2 tablets (10 mg total) by mouth daily, Disp: 180 tablet, Rfl: 3    SYNTHROID 112 MCG tablet, , Disp: , Rfl:     warfarin (COUMADIN) 2 5 mg tablet, Take 1 tablet by mouth daily, Disp: , Rfl:     predniSONE 10 mg tablet, , Disp: , Rfl:       Wt Readings from Last 3 Encounters:   09/21/20 98 6 kg (217 lb 6 4 oz)   03/04/20 93 8 kg (206 lb 12 8 oz)   02/03/20 94 3 kg (208 lb)     Temp Readings from Last 3 Encounters:   09/21/20 97 8 °F (36 6 °C) (Temporal)   02/03/20 100 °F (37 8 °C) (Tympanic)   11/10/19 98 9 °F (37 2 °C)     BP Readings from Last 3 Encounters:   09/21/20 126/74   03/04/20 140/88   02/03/20 142/80     Pulse Readings from Last 3 Encounters:   09/21/20 64   03/04/20 60   02/03/20 80         Physical Exam  HENT:      Head: Atraumatic  Mouth/Throat:      Mouth: Mucous membranes are moist    Eyes:      Extraocular Movements: Extraocular movements intact  Neck:      Musculoskeletal: Normal range of motion  Cardiovascular:      Rate and Rhythm: Normal rate and regular rhythm        Comments: Mechanical heart sounds  Pulmonary:      Effort: Pulmonary effort is normal       Breath sounds: Normal breath sounds  Abdominal:      General: Abdomen is flat  Musculoskeletal: Normal range of motion  Skin:     General: Skin is warm  Neurological:      General: No focal deficit present  Mental Status: She is alert and oriented to person, place, and time  Psychiatric:         Mood and Affect: Mood normal          Behavior: Behavior normal              Cardiac testing:     Results for orders placed during the hospital encounter of 19   Echo complete with contrast if indicated    [facility]ative [facility]  [facility], [address] [facility]  [phone]    Transthoracic Echocardiogram  2D, M-mode, Doppler, and Color Doppler    Study date:  2019    Patient: Gavin Banegas  MR number: [id number]  Account number: [de-identified]  : 1960  Age: 66 years  Gender: Female  Status: Outpatient  Location: [address] [place] [address] and [facility]  Height: 61 in  Weight: 214 lb  BP: 130/ 78 mmHg    Indications: Mitral valve replacement    Diagnoses: I34 1 - Nonrheumatic mitral (valve) prolapse, I34 9 - Nonrheumatic mitral valve disorder, unspecified    Sonographer:  SHRADDHA Caro  Primary Physician:  Lesli Archibald DO  Referring Physician:  Otoniel Payne MD  Group:  [id number]  Interpreting Physician:  Rosa Obrien MD    SUMMARY    LEFT VENTRICLE:  Systolic function was normal  Ejection fraction was estimated in the range of 55 % to 65 %  There were no regional wall motion abnormalities  MITRAL VALVE:  A mechanical prosthesis was present  It exhibited normal function  TRICUSPID VALVE:  There was trace regurgitation  PULMONIC VALVE:  There was trace regurgitation  HISTORY: PRIOR HISTORY: Hypertension, mechanical MVR, sleep apnea, COPD, hypothyroidism    PROCEDURE: The study was performed in the [address] [facility]  This was a routine study   The transthoracic approach was used  The study included complete 2D imaging, M-mode, complete spectral Doppler, and color Doppler  Image  quality was adequate  LEFT VENTRICLE: Size was normal  Systolic function was normal  Ejection fraction was estimated in the range of 55 % to 65 %  There were no regional wall motion abnormalities  Wall thickness was normal  DOPPLER: The study was not  technically sufficient to allow evaluation of LV diastolic function  RIGHT VENTRICLE: The size was normal  Systolic function was normal  Wall thickness was normal     LEFT ATRIUM: Size was at the upper limits of normal     RIGHT ATRIUM: Size was normal     MITRAL VALVE: A mechanical prosthesis was present  It exhibited normal function  AORTIC VALVE: The valve was trileaflet  Leaflets exhibited normal thickness and normal cuspal separation  DOPPLER: Transaortic velocity was within the normal range  There was no evidence for stenosis  There was no regurgitation  TRICUSPID VALVE: DOPPLER: There was trace regurgitation  PULMONIC VALVE: DOPPLER: There was trace regurgitation  PERICARDIUM: There was no pericardial effusion  The pericardium was normal in appearance  AORTA: The root exhibited normal size      SYSTEM MEASUREMENT TABLES    2D  %FS: 34 1 %  Ao Diam: 3 18 cm  EDV(Teich): 132 42 ml  EF(Cube): 71 38 %  EF(Teich): 62 63 %  ESV(Cube): 41 43 ml  ESV(Teich): 49 49 ml  IVSd: 1 04 cm  LA Area: 19 96 cm2  LA Diam: 3 84 cm  LVEDV MOD A4C: 136 81 ml  LVEF MOD A4C: 59 35 %  LVESV MOD A4C: 55 62 ml  LVIDd: 5 25 cm  LVIDs: 3 46 cm  LVLd A4C: 7 38 cm  LVLs A4C: 6 19 cm  LVPWd: 1 03 cm  RA Area: 16 15 cm2  RV Diam : 4 18 cm  SV MOD A4C: 81 19 ml  SV(Cube): 103 3 ml  SV(Teich): 82 94 ml    CW  MV VTI: 42 17 cm  MV Vmax: 1 53 m/s  MV Vmean: 0 86 m/s  MV maxP 39 mmHg  MV meanPG: 3 56 mmHg  TR Vmax: 2 64 m/s  TR maxP 79 mmHg    MM  TAPSE: 2 24 cm    PW  E': 0 06 m/s  E/E': 28 44  MV A Bobby: 0 98 m/s  MV Dec [place]: 5 06 m/s2  MV DecT: 338 77 ms  MV E Bobby: 1 71 m/s  MV E/A Ratio: 1 75    [employer] Commission Accredited Echocardiography Laboratory    Prepared and electronically signed by    Sammy Saini MD  Signed 10-Apr-2019 08:44:29

## 2020-09-25 ENCOUNTER — TRANSCRIBE ORDERS (OUTPATIENT)
Dept: ADMINISTRATIVE | Facility: HOSPITAL | Age: 60
End: 2020-09-25

## 2020-09-25 ENCOUNTER — LAB REQUISITION (OUTPATIENT)
Dept: LAB | Facility: HOSPITAL | Age: 60
End: 2020-09-25
Payer: COMMERCIAL

## 2020-09-25 ENCOUNTER — ANTICOAG VISIT (OUTPATIENT)
Dept: CARDIOLOGY CLINIC | Facility: CLINIC | Age: 60
End: 2020-09-25

## 2020-09-25 DIAGNOSIS — D69.3 IMMUNE THROMBOCYTOPENIC PURPURA (HCC): ICD-10-CM

## 2020-09-25 DIAGNOSIS — D68.62 LUPUS ANTICOAGULANT SYNDROME (HCC): ICD-10-CM

## 2020-09-25 DIAGNOSIS — Z51.11 ENCOUNTER FOR ANTINEOPLASTIC CHEMOTHERAPY: ICD-10-CM

## 2020-09-25 DIAGNOSIS — D47.2 IGA MONOCLONAL GAMMOPATHY: Primary | ICD-10-CM

## 2020-09-25 DIAGNOSIS — Z95.2 S/P MVR (MITRAL VALVE REPLACEMENT): ICD-10-CM

## 2020-09-25 DIAGNOSIS — D50.8 OTHER IRON DEFICIENCY ANEMIAS: ICD-10-CM

## 2020-09-25 LAB
ALBUMIN SERPL BCP-MCNC: 3.6 G/DL (ref 3.5–5)
ALP SERPL-CCNC: 64 U/L (ref 46–116)
ALT SERPL W P-5'-P-CCNC: 31 U/L (ref 12–78)
ANION GAP SERPL CALCULATED.3IONS-SCNC: 7 MMOL/L (ref 4–13)
AST SERPL W P-5'-P-CCNC: 22 U/L (ref 5–45)
BILIRUB SERPL-MCNC: 0.41 MG/DL (ref 0.2–1)
BUN SERPL-MCNC: 24 MG/DL (ref 5–25)
CALCIUM SERPL-MCNC: 9.5 MG/DL (ref 8.3–10.1)
CHLORIDE SERPL-SCNC: 106 MMOL/L (ref 100–108)
CO2 SERPL-SCNC: 29 MMOL/L (ref 21–32)
CREAT SERPL-MCNC: 1.77 MG/DL (ref 0.6–1.3)
GFR SERPL CREATININE-BSD FRML MDRD: 31 ML/MIN/1.73SQ M
GLUCOSE SERPL-MCNC: 116 MG/DL (ref 65–140)
INR PPP: 2.19 (ref 0.84–1.19)
LDH SERPL-CCNC: 437 U/L (ref 81–234)
MAGNESIUM SERPL-MCNC: 2.5 MG/DL (ref 1.6–2.6)
PHOSPHATE SERPL-MCNC: 3.2 MG/DL (ref 2.7–4.5)
POTASSIUM SERPL-SCNC: 4.8 MMOL/L (ref 3.5–5.3)
PROT SERPL-MCNC: 7.3 G/DL (ref 6.4–8.2)
SODIUM SERPL-SCNC: 142 MMOL/L (ref 136–145)
URATE SERPL-MCNC: 8.3 MG/DL (ref 2–6.8)

## 2020-09-25 PROCEDURE — 84100 ASSAY OF PHOSPHORUS: CPT | Performed by: INTERNAL MEDICINE

## 2020-09-25 PROCEDURE — 84550 ASSAY OF BLOOD/URIC ACID: CPT | Performed by: INTERNAL MEDICINE

## 2020-09-25 PROCEDURE — 83735 ASSAY OF MAGNESIUM: CPT | Performed by: INTERNAL MEDICINE

## 2020-09-25 PROCEDURE — 83615 LACTATE (LD) (LDH) ENZYME: CPT | Performed by: INTERNAL MEDICINE

## 2020-09-25 PROCEDURE — 80053 COMPREHEN METABOLIC PANEL: CPT | Performed by: INTERNAL MEDICINE

## 2020-09-29 ENCOUNTER — HOSPITAL ENCOUNTER (OUTPATIENT)
Dept: ULTRASOUND IMAGING | Facility: HOSPITAL | Age: 60
Discharge: HOME | End: 2020-09-29
Attending: INTERNAL MEDICINE
Payer: COMMERCIAL

## 2020-09-29 DIAGNOSIS — D47.2 IGA MONOCLONAL GAMMOPATHY: ICD-10-CM

## 2020-09-29 PROCEDURE — 76770 US EXAM ABDO BACK WALL COMP: CPT

## 2020-10-06 ENCOUNTER — ANTICOAG VISIT (OUTPATIENT)
Dept: CARDIOLOGY CLINIC | Facility: CLINIC | Age: 60
End: 2020-10-06

## 2020-10-06 DIAGNOSIS — Z95.2 S/P MVR (MITRAL VALVE REPLACEMENT): ICD-10-CM

## 2020-10-06 LAB — INR PPP: 2.9 (ref 0.84–1.19)

## 2020-10-26 LAB — INR PPP: 2.4 (ref 0.84–1.19)

## 2020-10-27 ENCOUNTER — ANTICOAG VISIT (OUTPATIENT)
Dept: CARDIOLOGY CLINIC | Facility: CLINIC | Age: 60
End: 2020-10-27

## 2020-10-27 DIAGNOSIS — Z95.2 S/P MVR (MITRAL VALVE REPLACEMENT): ICD-10-CM

## 2020-11-23 ENCOUNTER — ANTICOAG VISIT (OUTPATIENT)
Dept: CARDIOLOGY CLINIC | Facility: CLINIC | Age: 60
End: 2020-11-23

## 2020-11-23 DIAGNOSIS — Z95.2 S/P MVR (MITRAL VALVE REPLACEMENT): ICD-10-CM

## 2020-11-23 LAB — INR PPP: 2.17 (ref 0.84–1.19)

## 2020-12-21 LAB — INR PPP: 3.04 (ref 0.84–1.19)

## 2020-12-22 ENCOUNTER — ANTICOAG VISIT (OUTPATIENT)
Dept: CARDIOLOGY CLINIC | Facility: CLINIC | Age: 60
End: 2020-12-22

## 2020-12-22 DIAGNOSIS — Z95.2 S/P MVR (MITRAL VALVE REPLACEMENT): ICD-10-CM

## 2021-01-07 ENCOUNTER — ANTICOAG VISIT (OUTPATIENT)
Dept: CARDIOLOGY CLINIC | Facility: CLINIC | Age: 61
End: 2021-01-07

## 2021-01-07 DIAGNOSIS — Z95.2 S/P MVR (MITRAL VALVE REPLACEMENT): ICD-10-CM

## 2021-01-07 LAB — INR PPP: 2.66 (ref 0.84–1.19)

## 2021-01-19 ENCOUNTER — ANTICOAG VISIT (OUTPATIENT)
Dept: CARDIOLOGY CLINIC | Facility: CLINIC | Age: 61
End: 2021-01-19

## 2021-01-19 DIAGNOSIS — Z95.2 S/P MVR (MITRAL VALVE REPLACEMENT): ICD-10-CM

## 2021-01-19 LAB — INR PPP: 3.02 (ref 0.84–1.19)

## 2021-01-30 LAB — INR PPP: 3 (ref 0.84–1.19)

## 2021-02-01 ENCOUNTER — ANTICOAG VISIT (OUTPATIENT)
Dept: CARDIOLOGY CLINIC | Facility: CLINIC | Age: 61
End: 2021-02-01

## 2021-02-01 DIAGNOSIS — Z95.2 S/P MVR (MITRAL VALVE REPLACEMENT): ICD-10-CM

## 2021-02-08 ENCOUNTER — TELEMEDICINE (OUTPATIENT)
Dept: FAMILY MEDICINE CLINIC | Facility: CLINIC | Age: 61
End: 2021-02-08
Payer: COMMERCIAL

## 2021-02-08 DIAGNOSIS — R09.81 NASAL CONGESTION: Primary | ICD-10-CM

## 2021-02-08 DIAGNOSIS — R05.9 COUGH: ICD-10-CM

## 2021-02-08 PROCEDURE — 99213 OFFICE O/P EST LOW 20 MIN: CPT | Performed by: FAMILY MEDICINE

## 2021-02-08 NOTE — PROGRESS NOTES
Virtual Regular Visit      Assessment/Plan:    Patient is being sent for COVID-19 testing  Will heed results  Patient to home quarantine until test results known  Recommend increase fluids and rest   Follow-up later this week or call sooner  Problem List Items Addressed This Visit     None      Visit Diagnoses     Nasal congestion    -  Primary    Relevant Orders    Novel Coronavirus (Covid-19),PCR [facility] - Collected at [place] [id number] or [facility]    Cough        Relevant Orders    Novel Coronavirus (Covid-19),PCR [facility] - Collected at [facility] or [facility]               Reason for visit is   Chief Complaint   Patient presents with    Virtual Regular Visit        Encounter provider Kajal Jones DO    Provider located at [address] [place] [address] [phone]      Recent Visits  No visits were found meeting these conditions  Showing recent visits within past 7 days and meeting all other requirements     Today's Visits  Date Type Provider Dept   02/08/21 Telemedicine Kajal Jones DO [facility]   Showing today's visits and meeting all other requirements     Future Appointments  No visits were found meeting these conditions  Showing future appointments within next 150 days and meeting all other requirements        The patient was identified by name and date of birth  Palomo Jones was informed that this is a telemedicine visit and that the visit is being conducted through [employer] and patient was informed that this is not a secure, HIPAA-compliant platform  She agrees to proceed     My office door was closed  No one else was in the room  She acknowledged consent and understanding of privacy and security of the video platform  The patient has agreed to participate and understands they can discontinue the visit at any time  Patient is aware this is a billable service       Subjective  Palomo Jones is a 64 y o  female  Past couple days complains of cold symptoms including nasal congestion, sinus drainage productive of clear mucus slight postnasal drainage and slight nonproductive cough  She admits to mild shortness of breath with exertion  She denies chest tightness or wheezing  Patient denies sore throat or earache  Patient denies fever, chills, sweats or headache  Temperature is 97 7  She admits to mild body aches  Patient denies loss of sense of smell or taste, nausea, vomiting, diarrhea, rash or red eyes  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  She shares a room with another teacher who is currently pregnant  Patient took half a day off of school today and did visit the school nurse  HPI     Past Medical History:   Diagnosis Date    Disease of thyroid gland     History of ITP     History of transfusion     Hypertension     Sleep apnea        Past Surgical History:   Procedure Laterality Date    VALVE REPLACEMENT         Current Outpatient Medications   Medication Sig Dispense Refill    ascorbic acid (VITAMIN C) 500 mg tablet Take 500 mg by mouth daily      atenolol (TENORMIN) 25 mg tablet Take 1 tablet (25 mg total) by mouth daily 90 tablet 3    Cholecalciferol (VITAMIN D-3) 1000 units CAPS Take 2,000 Units by mouth daily      ergocalciferol (VITAMIN D2) 50,000 units Take by mouth once a week       hydroxychloroquine (PLAQUENIL) 200 mg tablet Take by mouth      Magnesium 400 MG CAPS Take 4 capsules by mouth daily       predniSONE 10 mg tablet       predniSONE 5 mg tablet Take 1 tablet by mouth daily      Probiotic Product (PROBIOTIC-10) CHEW Chew      quinapril (ACCUPRIL) 5 mg tablet Take 2 tablets (10 mg total) by mouth daily 180 tablet 3    SYNTHROID 112 MCG tablet       warfarin (COUMADIN) 2 5 mg tablet Take 1 tablet by mouth daily       No current facility-administered medications for this visit           Allergies   Allergen Reactions    Erythromycin     Penicillins Delirium and Hives    Percocet [Oxycodone-Acetaminophen] Nausea Only       Review of Systems    Video Exam    There were no vitals filed for this visit  Physical Exam  Constitutional:       General: She is not in acute distress  Appearance: Normal appearance  She is not ill-appearing, toxic-appearing or diaphoretic  HENT:      Head: Normocephalic  Eyes:      General: No scleral icterus  Conjunctiva/sclera: Conjunctivae normal    Pulmonary:      Effort: Pulmonary effort is normal  No respiratory distress  Comments: Patient is talking in complete sentences without shortness of breath or cough  Neurological:      Mental Status: She is alert and oriented to person, place, and time  Psychiatric:         Mood and Affect: Mood normal          Behavior: Behavior normal          Thought Content: Thought content normal          Judgment: Judgment normal           I spent 15 minutes directly with the patient during this visit      VIRTUAL VISIT DISCLAIMER    Wade Martin acknowledges that she has consented to an online visit or consultation  She understands that the online visit is based solely on information provided by her, and that, in the absence of a face-to-face physical evaluation by the physician, the diagnosis she receives is both limited and provisional in terms of accuracy and completeness  This is not intended to replace a full medical face-to-face evaluation by the physician  Wade Martin understands and accepts these terms

## 2021-02-09 DIAGNOSIS — R09.81 NASAL CONGESTION: ICD-10-CM

## 2021-02-09 DIAGNOSIS — R05.9 COUGH: ICD-10-CM

## 2021-02-09 LAB — SARS-COV-2 RNA RESP QL NAA+PROBE: POSITIVE

## 2021-02-09 PROCEDURE — U0003 INFECTIOUS AGENT DETECTION BY NUCLEIC ACID (DNA OR RNA); SEVERE ACUTE RESPIRATORY SYNDROME CORONAVIRUS 2 (SARS-COV-2) (CORONAVIRUS DISEASE [COVID-19]), AMPLIFIED PROBE TECHNIQUE, MAKING USE OF HIGH THROUGHPUT TECHNOLOGIES AS DESCRIBED BY CMS-2020-01-R: HCPCS | Performed by: FAMILY MEDICINE

## 2021-02-09 PROCEDURE — U0005 INFEC AGEN DETEC AMPLI PROBE: HCPCS | Performed by: FAMILY MEDICINE

## 2021-02-10 ENCOUNTER — TELEMEDICINE (OUTPATIENT)
Dept: FAMILY MEDICINE CLINIC | Facility: CLINIC | Age: 61
End: 2021-02-10
Payer: COMMERCIAL

## 2021-02-10 DIAGNOSIS — U07.1 COVID-19 VIRUS INFECTION: Primary | ICD-10-CM

## 2021-02-10 PROBLEM — IMO0002 EXCESSIVE ANTICOAGULATION: Status: ACTIVE | Noted: 2019-05-12

## 2021-02-10 PROBLEM — D69.3 IDIOPATHIC THROMBOCYTOPENIC PURPURA (HCC): Status: ACTIVE | Noted: 2017-07-11

## 2021-02-10 PROBLEM — E55.9 VITAMIN D DEFICIENCY: Status: ACTIVE | Noted: 2017-01-10

## 2021-02-10 PROBLEM — Z79.4 ENCOUNTER FOR LONG-TERM (CURRENT) USE OF INSULIN (HCC): Status: ACTIVE | Noted: 2019-05-12

## 2021-02-10 PROBLEM — M85.80 OSTEOPENIA: Status: ACTIVE | Noted: 2019-05-12

## 2021-02-10 PROBLEM — D68.61: Status: ACTIVE | Noted: 2019-05-12

## 2021-02-10 PROCEDURE — 99214 OFFICE O/P EST MOD 30 MIN: CPT | Performed by: FAMILY MEDICINE

## 2021-02-10 NOTE — LETTER
February 23, 2021     Patient: Monalisa Graf   YOB: 1960   Date of Visit: 2/10/2021       To Whom it May Concern:    Claudia Geoff is under my professional care  She was seen in my office on 2/10/2021  She may return to work on 2/24/2021  Patient is clear of COVID  If you have any questions or concerns, please don't hesitate to call           Sincerely,          Santana Byrnes DO        CC: No Recipients

## 2021-02-10 NOTE — PROGRESS NOTES
Virtual Regular Visit      Assessment/Plan:   discussed treatment options with patient  Discussed monoclonal antibody IV infusion treatment and patient is unsure at this time and she would like to discuss this with Dr Alecia Craven, her hematologist later today  Also discussed possible colchicine  Patient to continue present treatment and remain on home isolation  Patient will call back later today after talking with Dr Alecia Craven regarding scheduling monoclonal antibody treatment  Discussed with patient that monoclonal antibody treatment should be done by day 7 and will not be available after day 10  Patient request that her  get tested for COVID-19  He only has mild cold symptoms at this time  Problem List Items Addressed This Visit     None      Visit Diagnoses     COVID-19 virus infection    -  Primary               Reason for visit is No chief complaint on file  Encounter provider Santana Byrnes DO    Provider located at [address] [place] [address] [id number]      Recent Visits  Date Type Provider Dept   02/08/21 [address], DO [facility]   Showing recent visits within past 7 days and meeting all other requirements     Today's Visits  Date Type Provider Dept   02/10/21 [address], DO [facility] [place] [facility]   Showing today's visits and meeting all other requirements     Future Appointments  No visits were found meeting these conditions  Showing future appointments within next 150 days and meeting all other requirements        The patient was identified by name and date of birth  Monalisa Graf was informed that this is a telemedicine visit and that the visit is being conducted through [employer] and patient was informed that this is not a secure, HIPAA-compliant platform  She agrees to proceed     My office door was closed  No one else was in the room    She acknowledged consent and understanding of privacy and security of the video platform  The patient has agreed to participate and understands they can discontinue the visit at any time  Patient is aware this is a billable service  Subjective  Goldie Lombard is a 64 y o  female Is being contacted follow-up for positive COVID-19 infection  Patient tested positive on 02/09/2021 and her symptoms started on 02/04/2021 so today is day 6  Patient states she is feeling well overall just feels like she has a "head cold or sinus infection "she denies fever, chills or sweats  She denies headache although admits to sinus pressure  Patient states her body aches are improving  She admits to a slight cough which is nonproductive and slight shortness of breath only with exertion walking up the stairs a few times  Patient denies loss of sense of smell or taste, rash or red eyes  She denies nausea, vomiting or diarrhea  Temperature is 97 7  Patient started vitamin-C, vitamin-D and Pepcid and plans to start zinc later today        HPI     Past Medical History:   Diagnosis Date    Disease of thyroid gland     History of ITP     History of transfusion     Hypertension     Sleep apnea        Past Surgical History:   Procedure Laterality Date    VALVE REPLACEMENT         Current Outpatient Medications   Medication Sig Dispense Refill    ascorbic acid (VITAMIN C) 500 mg tablet Take 500 mg by mouth daily      atenolol (TENORMIN) 25 mg tablet Take 1 tablet (25 mg total) by mouth daily 90 tablet 3    Cholecalciferol (VITAMIN D-3) 1000 units CAPS Take 2,000 Units by mouth daily      ergocalciferol (VITAMIN D2) 50,000 units Take by mouth once a week       hydroxychloroquine (PLAQUENIL) 200 mg tablet Take by mouth      Magnesium 400 MG CAPS Take 4 capsules by mouth daily       predniSONE 10 mg tablet       predniSONE 5 mg tablet Take 1 tablet by mouth daily      Probiotic Product (PROBIOTIC-10) CHEW Chew      quinapril (ACCUPRIL) 5 mg tablet Take 2 tablets (10 mg total) by mouth daily 180 tablet 3    SYNTHROID 112 MCG tablet       warfarin (COUMADIN) 2 5 mg tablet Take 1 tablet by mouth daily       No current facility-administered medications for this visit  Allergies   Allergen Reactions    Erythromycin     Penicillins Delirium and Hives    Percocet [Oxycodone-Acetaminophen] Nausea Only       Review of Systems    Video Exam    There were no vitals filed for this visit  Physical Exam  Constitutional:       General: She is not in acute distress  Appearance: Normal appearance  She is not ill-appearing, toxic-appearing or diaphoretic  HENT:      Head: Normocephalic  Mouth/Throat:      Mouth: Mucous membranes are moist    Eyes:      General: No scleral icterus  Conjunctiva/sclera: Conjunctivae normal    Pulmonary:      Effort: Pulmonary effort is normal  No respiratory distress  Comments: Patient is talking in complete sentences without shortness of breath or cough  Neurological:      Mental Status: She is alert and oriented to person, place, and time  Psychiatric:         Mood and Affect: Mood normal          Behavior: Behavior normal          Thought Content: Thought content normal          Judgment: Judgment normal           I spent 25 minutes directly with the patient during this visit      VIRTUAL VISIT DISCLAIMER    Darryl Carmichael acknowledges that she has consented to an online visit or consultation  She understands that the online visit is based solely on information provided by her, and that, in the absence of a face-to-face physical evaluation by the physician, the diagnosis she receives is both limited and provisional in terms of accuracy and completeness  This is not intended to replace a full medical face-to-face evaluation by the physician  Darryl Carmichael understands and accepts these terms

## 2021-02-10 NOTE — LETTER
February 23, 2021     Patient: Parker Caballero   YOB: 1960   Date of Visit: 2/10/2021       To Whom it May Concern:    Bridget Rodriguez is under my professional care  She was seen in my office on 2/10/2021  She may return to work on 02/24/2021  If you have any questions or concerns, please don't hesitate to call           Sincerely,          Lydia Louis DO        CC: No Recipients

## 2021-02-23 ENCOUNTER — TELEPHONE (OUTPATIENT)
Dept: FAMILY MEDICINE CLINIC | Facility: CLINIC | Age: 61
End: 2021-02-23

## 2021-03-04 ENCOUNTER — LAB REQUISITION (OUTPATIENT)
Dept: LAB | Facility: HOSPITAL | Age: 61
End: 2021-03-04
Payer: COMMERCIAL

## 2021-03-04 DIAGNOSIS — D69.3 IMMUNE THROMBOCYTOPENIC PURPURA (HCC): ICD-10-CM

## 2021-03-04 DIAGNOSIS — D50.8 OTHER IRON DEFICIENCY ANEMIAS: ICD-10-CM

## 2021-03-04 DIAGNOSIS — D68.62 LUPUS ANTICOAGULANT SYNDROME (HCC): ICD-10-CM

## 2021-03-04 LAB
BASOPHILS # BLD AUTO: 0.02 THOUSANDS/ΜL (ref 0–0.1)
BASOPHILS NFR BLD AUTO: 0 % (ref 0–1)
EOSINOPHIL # BLD AUTO: 0.1 THOUSAND/ΜL (ref 0–0.61)
EOSINOPHIL NFR BLD AUTO: 2 % (ref 0–6)
ERYTHROCYTE [DISTWIDTH] IN BLOOD BY AUTOMATED COUNT: 15.9 % (ref 11.6–15.1)
HCT VFR BLD AUTO: 36.7 % (ref 34.8–46.1)
HGB BLD-MCNC: 11.5 G/DL (ref 11.5–15.4)
IMM GRANULOCYTES # BLD AUTO: 0.02 THOUSAND/UL (ref 0–0.2)
IMM GRANULOCYTES NFR BLD AUTO: 0 % (ref 0–2)
LYMPHOCYTES # BLD AUTO: 0.62 THOUSANDS/ΜL (ref 0.6–4.47)
LYMPHOCYTES NFR BLD AUTO: 11 % (ref 14–44)
MCH RBC QN AUTO: 26.1 PG (ref 26.8–34.3)
MCHC RBC AUTO-ENTMCNC: 31.3 G/DL (ref 31.4–37.4)
MCV RBC AUTO: 83 FL (ref 82–98)
MONOCYTES # BLD AUTO: 0.57 THOUSAND/ΜL (ref 0.17–1.22)
MONOCYTES NFR BLD AUTO: 10 % (ref 4–12)
NEUTROPHILS # BLD AUTO: 4.39 THOUSANDS/ΜL (ref 1.85–7.62)
NEUTS SEG NFR BLD AUTO: 77 % (ref 43–75)
NRBC BLD AUTO-RTO: 0 /100 WBCS
PLATELET # BLD AUTO: 120 THOUSANDS/UL (ref 149–390)
PMV BLD AUTO: 12.6 FL (ref 8.9–12.7)
RBC # BLD AUTO: 4.4 MILLION/UL (ref 3.81–5.12)
WBC # BLD AUTO: 5.72 THOUSAND/UL (ref 4.31–10.16)

## 2021-03-04 PROCEDURE — 86361 T CELL ABSOLUTE COUNT: CPT | Performed by: INTERNAL MEDICINE

## 2021-03-04 PROCEDURE — 85025 COMPLETE CBC W/AUTO DIFF WBC: CPT | Performed by: INTERNAL MEDICINE

## 2021-03-05 LAB
BASOPHILS # BLD AUTO: 0 X10E3/UL (ref 0–0.2)
BASOPHILS NFR BLD AUTO: 0 %
CD3+CD4+ CELLS # BLD: 145 /UL (ref 359–1519)
CD3+CD4+ CELLS NFR BLD: 28.9 % (ref 30.8–58.5)
EOSINOPHIL # BLD AUTO: 0.1 X10E3/UL (ref 0–0.4)
EOSINOPHIL NFR BLD AUTO: 1 %
ERYTHROCYTE [DISTWIDTH] IN BLOOD BY AUTOMATED COUNT: 15.8 % (ref 11.7–15.4)
HCT VFR BLD AUTO: 36.5 % (ref 34–46.6)
HGB BLD-MCNC: 11.8 G/DL (ref 11.1–15.9)
IMM GRANULOCYTES # BLD: 0.1 X10E3/UL (ref 0–0.1)
IMM GRANULOCYTES NFR BLD: 1 %
LYMPHOCYTES # BLD AUTO: 0.5 X10E3/UL (ref 0.7–3.1)
LYMPHOCYTES NFR BLD AUTO: 6 %
MCH RBC QN AUTO: 26.7 PG (ref 26.6–33)
MCHC RBC AUTO-ENTMCNC: 32.3 G/DL (ref 31.5–35.7)
MCV RBC AUTO: 83 FL (ref 79–97)
MONOCYTES # BLD AUTO: 0.5 X10E3/UL (ref 0.1–0.9)
MONOCYTES NFR BLD AUTO: 6 %
NEUTROPHILS # BLD AUTO: 6.2 X10E3/UL (ref 1.4–7)
NEUTROPHILS NFR BLD AUTO: 86 %
PLATELET # BLD AUTO: 125 X10E3/UL (ref 150–450)
RBC # BLD AUTO: 4.42 X10E6/UL (ref 3.77–5.28)
WBC # BLD AUTO: 7.3 X10E3/UL (ref 3.4–10.8)

## 2021-03-11 DIAGNOSIS — Z95.2 S/P MVR (MITRAL VALVE REPLACEMENT): Primary | ICD-10-CM

## 2021-03-12 LAB — INR PPP: 3.2 (ref 0.84–1.19)

## 2021-03-15 ENCOUNTER — ANTICOAG VISIT (OUTPATIENT)
Dept: CARDIOLOGY CLINIC | Facility: CLINIC | Age: 61
End: 2021-03-15

## 2021-03-15 DIAGNOSIS — Z95.2 S/P MVR (MITRAL VALVE REPLACEMENT): ICD-10-CM

## 2021-04-08 ENCOUNTER — TRANSCRIBE ORDERS (OUTPATIENT)
Dept: ADMINISTRATIVE | Facility: HOSPITAL | Age: 61
End: 2021-04-08

## 2021-04-08 ENCOUNTER — ANTICOAG VISIT (OUTPATIENT)
Dept: CARDIOLOGY CLINIC | Facility: CLINIC | Age: 61
End: 2021-04-08

## 2021-04-08 DIAGNOSIS — E85.9 MYELOMA ASSOCIATED AMYLOIDOSIS (HCC): Primary | ICD-10-CM

## 2021-04-08 DIAGNOSIS — C90.00 MYELOMA ASSOCIATED AMYLOIDOSIS (HCC): Primary | ICD-10-CM

## 2021-04-08 DIAGNOSIS — C90.00 MULTIPLE MYELOMA AND IMMUNOPROLIFERATIVE NEOPLASMS (HCC): ICD-10-CM

## 2021-04-08 DIAGNOSIS — Z95.2 S/P MVR (MITRAL VALVE REPLACEMENT): ICD-10-CM

## 2021-04-08 DIAGNOSIS — C88.9 MULTIPLE MYELOMA AND IMMUNOPROLIFERATIVE NEOPLASMS (HCC): ICD-10-CM

## 2021-04-08 LAB — INR PPP: 2.15 (ref 0.84–1.19)

## 2021-04-19 LAB — INR PPP: 3.4 (ref 0.84–1.19)

## 2021-04-20 ENCOUNTER — ANTICOAG VISIT (OUTPATIENT)
Dept: CARDIOLOGY CLINIC | Facility: CLINIC | Age: 61
End: 2021-04-20

## 2021-04-20 DIAGNOSIS — Z95.2 S/P MVR (MITRAL VALVE REPLACEMENT): ICD-10-CM

## 2021-04-21 ENCOUNTER — OFFICE VISIT (OUTPATIENT)
Dept: CARDIOLOGY CLINIC | Facility: CLINIC | Age: 61
End: 2021-04-21
Payer: COMMERCIAL

## 2021-04-21 VITALS
HEART RATE: 61 BPM | DIASTOLIC BLOOD PRESSURE: 84 MMHG | WEIGHT: 211.3 LBS | BODY MASS INDEX: 39.89 KG/M2 | HEIGHT: 61 IN | SYSTOLIC BLOOD PRESSURE: 128 MMHG

## 2021-04-21 DIAGNOSIS — I10 ESSENTIAL HYPERTENSION: ICD-10-CM

## 2021-04-21 DIAGNOSIS — D69.3 IDIOPATHIC THROMBOCYTOPENIC PURPURA (HCC): ICD-10-CM

## 2021-04-21 DIAGNOSIS — I34.0 MITRAL VALVE INSUFFICIENCY, UNSPECIFIED ETIOLOGY: ICD-10-CM

## 2021-04-21 DIAGNOSIS — Z95.2 S/P MVR (MITRAL VALVE REPLACEMENT): ICD-10-CM

## 2021-04-21 DIAGNOSIS — R00.2 PALPITATIONS: Primary | ICD-10-CM

## 2021-04-21 PROCEDURE — 3074F SYST BP LT 130 MM HG: CPT | Performed by: INTERNAL MEDICINE

## 2021-04-21 PROCEDURE — 3008F BODY MASS INDEX DOCD: CPT | Performed by: INTERNAL MEDICINE

## 2021-04-21 PROCEDURE — 93000 ELECTROCARDIOGRAM COMPLETE: CPT | Performed by: INTERNAL MEDICINE

## 2021-04-21 PROCEDURE — 99214 OFFICE O/P EST MOD 30 MIN: CPT | Performed by: INTERNAL MEDICINE

## 2021-04-21 PROCEDURE — 3079F DIAST BP 80-89 MM HG: CPT | Performed by: INTERNAL MEDICINE

## 2021-04-21 RX ORDER — ALLOPURINOL 100 MG/1
100 TABLET ORAL DAILY
COMMUNITY
Start: 2021-03-24

## 2021-04-21 NOTE — PROGRESS NOTES
Cardiology   Nilesh Mackenzie 64 y o  female MRN: [id number]        Impression:  1  S/P mechanical MVR - doing well   On Warfarin  2  ITP - will discontinue ASA   Patient has no CAD  3  Hypertension - controlled  4  Palpitations - unclear etiology       Recommendations:  1  Continue current medications  2  Check 24 hour holter monitor to evaluate for dysrhythmia  3  Check echo to evaluate MVR  4  Follow up in 6 months      HPI: Nilesh Mackenzie is a 64y o  year old female with a history of mechancial MVR, HTN, SLE, ITP, who presents for follow up   Echocardiogram 4/19 demonstrated normal LV systolic function with normal MVR  Was being treated for Lyme over the summer with prednisone  Has been feeling well since  No chest pain or shortness of breath  Has palpitations - several times/day since COVID-19 infection 2/21  Review of Systems   Constitutional: Negative  HENT: Negative  Eyes: Negative  Respiratory: Negative for chest tightness and shortness of breath  Cardiovascular: Positive for palpitations  Negative for chest pain and leg swelling  Gastrointestinal: Negative  Endocrine: Negative  Genitourinary: Negative  Musculoskeletal: Negative  Skin: Negative  Allergic/Immunologic: Negative  Neurological: Negative  Hematological: Negative  Psychiatric/Behavioral: Negative  All other systems reviewed and are negative          Past Medical History:   Diagnosis Date    Disease of thyroid gland     History of ITP     History of transfusion     Hypertension     Sleep apnea      Past Surgical History:   Procedure Laterality Date    VALVE REPLACEMENT       Social History     Substance and Sexual Activity   Alcohol Use Yes    Comment: Social     Social History     Substance and Sexual Activity   Drug Use No     Social History     Tobacco Use   Smoking Status Never Smoker   Smokeless Tobacco Never Used     Family History   Problem Relation Age of Onset    Diabetes Father     Other Father         Multiple myeloma     Coronary artery disease Maternal Grandmother     Diabetes Maternal Grandmother        Allergies: Allergies   Allergen Reactions    Erythromycin     Penicillins Delirium and Hives    Percocet [Oxycodone-Acetaminophen] Nausea Only       Medications:     Current Outpatient Medications:     ascorbic acid (VITAMIN C) 500 mg tablet, Take 500 mg by mouth daily, Disp: , Rfl:     atenolol (TENORMIN) 25 mg tablet, Take 1 tablet (25 mg total) by mouth daily, Disp: 90 tablet, Rfl: 3    Cholecalciferol (VITAMIN D-3) 1000 units CAPS, Take 2,000 Units by mouth daily, Disp: , Rfl:     ergocalciferol (VITAMIN D2) 50,000 units, Take by mouth once a week , Disp: , Rfl:     hydroxychloroquine (PLAQUENIL) 200 mg tablet, Take by mouth, Disp: , Rfl:     Magnesium 400 MG CAPS, Take 4 capsules by mouth daily , Disp: , Rfl:     predniSONE 5 mg tablet, Take 1 tablet by mouth daily, Disp: , Rfl:     Probiotic Product (PROBIOTIC-10) CHEW, Chew, Disp: , Rfl:     quinapril (ACCUPRIL) 5 mg tablet, Take 2 tablets (10 mg total) by mouth daily, Disp: 180 tablet, Rfl: 3    SYNTHROID 112 MCG tablet, , Disp: , Rfl:     warfarin (COUMADIN) 2 5 mg tablet, Take 1 tablet by mouth daily, Disp: , Rfl:     allopurinol (ZYLOPRIM) 100 mg tablet, , Disp: , Rfl:     predniSONE 10 mg tablet, 5 mg , Disp: , Rfl:       Wt Readings from Last 3 Encounters:   04/21/21 95 8 kg (211 lb 4 8 oz)   09/21/20 98 6 kg (217 lb 6 4 oz)   03/04/20 93 8 kg (206 lb 12 8 oz)     Temp Readings from Last 3 Encounters:   09/21/20 97 8 °F (36 6 °C) (Temporal)   02/03/20 100 °F (37 8 °C) (Tympanic)   11/10/19 98 9 °F (37 2 °C)     BP Readings from Last 3 Encounters:   04/21/21 128/84   09/21/20 126/74   03/04/20 140/88     Pulse Readings from Last 3 Encounters:   04/21/21 61   09/21/20 64   03/04/20 60         Physical Exam  HENT:      Head: Atraumatic        Mouth/Throat:      Mouth: Mucous membranes are moist  Eyes:      Extraocular Movements: Extraocular movements intact  Neck:      Musculoskeletal: Normal range of motion  Cardiovascular:      Rate and Rhythm: Normal rate and regular rhythm  Heart sounds: Normal heart sounds  Comments: Mechanical heart sounds  Pulmonary:      Effort: Pulmonary effort is normal       Breath sounds: Normal breath sounds  Abdominal:      General: Abdomen is flat  Musculoskeletal: Normal range of motion  Skin:     General: Skin is warm  Neurological:      General: No focal deficit present  Mental Status: She is alert and oriented to person, place, and time     Psychiatric:         Mood and Affect: Mood normal          Behavior: Behavior normal            Laboratory Studies:  CMP:  Lab Results   Component Value Date    K 4 8 2020     2020    CO2 29 2020    BUN 24 2020    CREATININE 1 77 (H) 2020    AST 22 2020    ALT 31 2020    EGFR 31 2020       Cardiac testing:   EKG reviewed personally: KASIA AGUIAR  Results for orders placed during the hospital encounter of 19   Echo complete with contrast if indicated    Narrative [facility] [address] [place] [address]  [phone]    Transthoracic Echocardiogram  2D, M-mode, Doppler, and Color Doppler    Study date:  2019    Patient: Morena Gotti  MR number: [id number]  Account number: [de-identified]  : 1960  Age: 66 years  Gender: Female  Status: Outpatient  Location: [address] [place] [address] [facility]  Height: 61 in  Weight: 214 lb  BP: 130/ 78 mmHg    Indications: Mitral valve replacement    Diagnoses: I34 1 - Nonrheumatic mitral (valve) prolapse, I34 9 - Nonrheumatic mitral valve disorder, unspecified    Sonographer:  SHRADDHA Lou  Primary Physician:  Yifan Bautista DO  Referring Physician:  Sd Potts MD  Group:  [facility]  Interpreting Physician:  Bard Aragon MD    SUMMARY    LEFT VENTRICLE:  Systolic function was normal  Ejection fraction was estimated in the range of 55 % to 65 %  There were no regional wall motion abnormalities  MITRAL VALVE:  A mechanical prosthesis was present  It exhibited normal function  TRICUSPID VALVE:  There was trace regurgitation  PULMONIC VALVE:  There was trace regurgitation  HISTORY: PRIOR HISTORY: Hypertension, mechanical MVR, sleep apnea, COPD, hypothyroidism    PROCEDURE: The study was performed in the [address]  This was a routine study  The transthoracic approach was used  The study included complete 2D imaging, M-mode, complete spectral Doppler, and color Doppler  Image  quality was adequate  LEFT VENTRICLE: Size was normal  Systolic function was normal  Ejection fraction was estimated in the range of 55 % to 65 %  There were no regional wall motion abnormalities  Wall thickness was normal  DOPPLER: The study was not  technically sufficient to allow evaluation of LV diastolic function  RIGHT VENTRICLE: The size was normal  Systolic function was normal  Wall thickness was normal     LEFT ATRIUM: Size was at the upper limits of normal     RIGHT ATRIUM: Size was normal     MITRAL VALVE: A mechanical prosthesis was present  It exhibited normal function  AORTIC VALVE: The valve was trileaflet  Leaflets exhibited normal thickness and normal cuspal separation  DOPPLER: Transaortic velocity was within the normal range  There was no evidence for stenosis  There was no regurgitation  TRICUSPID VALVE: DOPPLER: There was trace regurgitation  PULMONIC VALVE: DOPPLER: There was trace regurgitation  PERICARDIUM: There was no pericardial effusion  The pericardium was normal in appearance  AORTA: The root exhibited normal size      SYSTEM MEASUREMENT TABLES    2D  %FS: 34 1 %  Ao Diam: 3 18 cm  EDV(Teich): 132 42 ml  EF(Cube): 71 38 %  EF(Teich): 62 63 %  ESV(Cube): 41 43 ml  ESV(Teich): 49 49 ml  IVSd: 1 04 cm  LA Area: 19 96 cm2  LA Diam: 3 84 cm  LVEDV MOD A4C: 136 81 ml  LVEF MOD A4C: 59 35 %  LVESV MOD A4C: 55 62 ml  LVIDd: 5 25 cm  LVIDs: 3 46 cm  LVLd A4C: 7 38 cm  LVLs A4C: 6 19 cm  LVPWd: 1 03 cm  RA Area: 16 15 cm2  RV Diam : 4 18 cm  SV MOD A4C: 81 19 ml  SV(Cube): 103 3 ml  SV(Teich): 82 94 ml    CW  MV VTI: 42 17 cm  MV Vmax: 1 53 m/s  MV Vmean: 0 86 m/s  MV maxP 39 mmHg  MV meanPG: 3 56 mmHg  TR Vmax: 2 64 m/s  TR maxP 79 mmHg    MM  TAPSE: 2 24 cm    PW  E': 0 06 m/s  E/E': 28 44  MV A Bobby: 0 98 m/s  MV Dec Weld: 5 06 m/s2  MV DecT: 338 77 ms  MV E Bobby: 1 71 m/s  MV E/A Ratio: 1 75    [employer] Accredited Echocardiography Laboratory    Prepared and electronically signed by    Sherice Westfall MD  Signed 10-Apr-2019 08:44:29

## 2021-04-21 NOTE — PATIENT INSTRUCTIONS
Recommendations:  1  Continue current medications  2  Check 24 hour holter monitor to evaluate for dysrhythmia  3  Check echo to evaluate MVR    4  Follow up in 6 months

## 2021-04-22 ENCOUNTER — APPOINTMENT (OUTPATIENT)
Dept: CT IMAGING | Facility: HOSPITAL | Age: 61
End: 2021-04-22
Attending: INTERNAL MEDICINE
Payer: COMMERCIAL

## 2021-04-22 ENCOUNTER — HOSPITAL ENCOUNTER (OUTPATIENT)
Dept: CT IMAGING | Facility: HOSPITAL | Age: 61
Discharge: HOME | End: 2021-04-22
Attending: INTERNAL MEDICINE
Payer: COMMERCIAL

## 2021-04-22 ENCOUNTER — HOSPITAL ENCOUNTER (OUTPATIENT)
Dept: RADIOLOGY | Facility: HOSPITAL | Age: 61
Discharge: HOME | End: 2021-04-22
Attending: INTERNAL MEDICINE
Payer: COMMERCIAL

## 2021-04-22 DIAGNOSIS — C88.9 MULTIPLE MYELOMA AND IMMUNOPROLIFERATIVE NEOPLASMS (HCC): ICD-10-CM

## 2021-04-22 DIAGNOSIS — C90.00 MYELOMA ASSOCIATED AMYLOIDOSIS (HCC): ICD-10-CM

## 2021-04-22 DIAGNOSIS — C90.00 MULTIPLE MYELOMA AND IMMUNOPROLIFERATIVE NEOPLASMS (HCC): ICD-10-CM

## 2021-04-22 DIAGNOSIS — E85.9 MYELOMA ASSOCIATED AMYLOIDOSIS (HCC): ICD-10-CM

## 2021-04-22 PROCEDURE — 73200 CT UPPER EXTREMITY W/O DYE: CPT

## 2021-04-22 PROCEDURE — 70450 CT HEAD/BRAIN W/O DYE: CPT

## 2021-04-22 PROCEDURE — G1004 CDSM NDSC: HCPCS

## 2021-04-22 PROCEDURE — 77075 RADEX OSSEOUS SURVEY COMPL: CPT

## 2021-05-04 ENCOUNTER — RA CDI HCC (OUTPATIENT)
Dept: OTHER | Facility: HOSPITAL | Age: 61
End: 2021-05-04

## 2021-05-04 NOTE — PROGRESS NOTES
[facility]  coding opportunities          Chart reviewed, no opportunity found: CHART REVIEWED, NO OPPORTUNITY FOUND              Patients insurance company:  [employer] [facility] (Medicare Advantage and Commercial)

## 2021-05-07 ENCOUNTER — TELEPHONE (OUTPATIENT)
Dept: ADMINISTRATIVE | Facility: OTHER | Age: 61
End: 2021-05-07

## 2021-05-07 NOTE — TELEPHONE ENCOUNTER
----- Message from Beto Gamboa, [address] [place] sent at 2021  2:19 PM EDT -----  RegardinMadalyn Acevedo  21 2:19 PM    Hello, our patient Butch Tan has had Mammogram completed/performed  Please assist in updating the patient chart by pulling the Care Everywhere (CE) document  The date of service is 2020       Thank you,  Beto Gamboa MA  [facility]

## 2021-05-07 NOTE — TELEPHONE ENCOUNTER
Upon review of the In Basket request we were able to locate, review, and update the patient chart as requested for Mammogram     Any additional questions or concerns should be emailed to the Practice Liaisons via [email or web address] email, please do not reply via In Basket      Thank you  Marvel Gallegos

## 2021-05-10 ENCOUNTER — CONSULT (OUTPATIENT)
Dept: FAMILY MEDICINE CLINIC | Facility: CLINIC | Age: 61
End: 2021-05-10
Payer: COMMERCIAL

## 2021-05-10 VITALS
BODY MASS INDEX: 39.98 KG/M2 | OXYGEN SATURATION: 97 % | WEIGHT: 211.6 LBS | DIASTOLIC BLOOD PRESSURE: 90 MMHG | RESPIRATION RATE: 16 BRPM | HEART RATE: 64 BPM | SYSTOLIC BLOOD PRESSURE: 148 MMHG | TEMPERATURE: 97.5 F

## 2021-05-10 DIAGNOSIS — Z95.2 S/P MVR (MITRAL VALVE REPLACEMENT): ICD-10-CM

## 2021-05-10 DIAGNOSIS — M32.10 SYSTEMIC LUPUS ERYTHEMATOSUS WITH ORGAN SYSTEM INVOLVEMENT (HCC): ICD-10-CM

## 2021-05-10 DIAGNOSIS — D69.3 IDIOPATHIC THROMBOCYTOPENIC PURPURA (HCC): ICD-10-CM

## 2021-05-10 DIAGNOSIS — I10 ESSENTIAL HYPERTENSION: ICD-10-CM

## 2021-05-10 DIAGNOSIS — H26.9 CATARACT OF RIGHT EYE, UNSPECIFIED CATARACT TYPE: ICD-10-CM

## 2021-05-10 DIAGNOSIS — E03.9 HYPOTHYROIDISM, UNSPECIFIED TYPE: ICD-10-CM

## 2021-05-10 DIAGNOSIS — J44.9 OSA AND COPD OVERLAP SYNDROME (HCC): ICD-10-CM

## 2021-05-10 DIAGNOSIS — G47.33 OSA AND COPD OVERLAP SYNDROME (HCC): ICD-10-CM

## 2021-05-10 DIAGNOSIS — Z01.818 PREOPERATIVE CLEARANCE: Primary | ICD-10-CM

## 2021-05-10 PROCEDURE — 3725F SCREEN DEPRESSION PERFORMED: CPT | Performed by: FAMILY MEDICINE

## 2021-05-10 PROCEDURE — 3077F SYST BP >= 140 MM HG: CPT | Performed by: FAMILY MEDICINE

## 2021-05-10 PROCEDURE — 99214 OFFICE O/P EST MOD 30 MIN: CPT | Performed by: FAMILY MEDICINE

## 2021-05-10 PROCEDURE — 1036F TOBACCO NON-USER: CPT | Performed by: FAMILY MEDICINE

## 2021-05-10 PROCEDURE — 3080F DIAST BP >= 90 MM HG: CPT | Performed by: FAMILY MEDICINE

## 2021-05-10 NOTE — PROGRESS NOTES
Assessment/Plan:   preoperative H&P and medical clearance form completed and faxed  Patient to continue present treatment  Diagnoses and all orders for this visit:    Preoperative clearance    Cataract of right eye, unspecified cataract type    Essential hypertension    Hypothyroidism, unspecified type    Idiopathic thrombocytopenic purpura ([facility] )    S/P MVR (mitral valve replacement)    Systemic lupus erythematosus with organ system involvement (HCC)    DAVIDSON and COPD overlap syndrome ([facility] )          Subjective:      Patient ID: Mckay Read is a 64 y o  female  Patient is here for preoperative H&P and medical clearance for right cataract surgery scheduled on 05/20/2021 at the [facility] for specialized surgery with Dr Buster Vallejo, ophthalmologist   Past medical history reviewed on form  Patient admits to easy bruising but denies bleeding problems or problems with anesthesia in the past       The following portions of the patient's history were reviewed and updated as appropriate: allergies, current medications, past family history, past medical history, past social history, past surgical history and problem list     Review of Systems   Constitutional: Negative for activity change, appetite change, chills, diaphoresis, fatigue, fever and unexpected weight change  HENT: Negative  Eyes: Positive for itching and visual disturbance  Negative for photophobia, pain, discharge and redness  Respiratory: Negative for cough, chest tightness, shortness of breath and wheezing  Cardiovascular: Positive for palpitations  Negative for chest pain and leg swelling  Gastrointestinal: Negative for abdominal pain, blood in stool, constipation, diarrhea, nausea and vomiting  Endocrine: Negative for cold intolerance and heat intolerance  Genitourinary: Negative for difficulty urinating, dysuria, frequency, hematuria and urgency  Musculoskeletal: Positive for arthralgias   Negative for back pain, gait problem, joint swelling, myalgias, neck pain and neck stiffness  Skin: Positive for rash  Negative for wound  Neurological: Negative for dizziness, syncope, weakness, light-headedness and headaches  Hematological: Negative for adenopathy  Bruises/bleeds easily  Psychiatric/Behavioral: Negative for dysphoric mood and sleep disturbance  The patient is not nervous/anxious  Objective:      /90   Pulse 64   Temp 97 5 °F (36 4 °C) (Temporal)   Resp 16   Wt 96 kg (211 lb 9 6 oz)   SpO2 97%   Breastfeeding No   BMI 39 98 kg/m²          Physical Exam  Constitutional:       General: She is not in acute distress  Appearance: Normal appearance  She is obese  She is not ill-appearing, toxic-appearing or diaphoretic  HENT:      Head: Normocephalic  Mouth/Throat:      Mouth: Mucous membranes are moist    Eyes:      General: No scleral icterus  Conjunctiva/sclera: Conjunctivae normal    Neck:      Musculoskeletal: Neck supple  Vascular: No carotid bruit  Cardiovascular:      Rate and Rhythm: Normal rate and regular rhythm  Pulmonary:      Effort: Pulmonary effort is normal       Breath sounds: Normal breath sounds  Abdominal:      Palpations: Abdomen is soft  Tenderness: There is no abdominal tenderness  Musculoskeletal:      Right lower leg: No edema  Left lower leg: No edema  Lymphadenopathy:      Cervical: No cervical adenopathy  Skin:     General: Skin is warm and dry  Neurological:      General: No focal deficit present  Mental Status: She is alert and oriented to person, place, and time  Psychiatric:         Mood and Affect: Mood normal          Behavior: Behavior normal          Thought Content: Thought content normal          Judgment: Judgment normal          BMI Counseling: Body mass index is 39 98 kg/m²   The BMI is above normal  Nutrition recommendations include reducing portion sizes, decreasing overall calorie intake, 3-5 servings of fruits/vegetables daily, reducing fast food intake, consuming healthier snacks, decreasing soda and/or juice intake, moderation in carbohydrate intake and reducing intake of saturated fat and trans fat  Exercise recommendations include moderate aerobic physical activity for 150 minutes/week

## 2021-05-11 ENCOUNTER — TELEPHONE (OUTPATIENT)
Dept: ADMINISTRATIVE | Facility: OTHER | Age: 61
End: 2021-05-11

## 2021-05-11 NOTE — TELEPHONE ENCOUNTER
----- Message from [facility] sent at 5/10/2021  5:04 PM EDT -----  05/10/21 5:04 PM    Hello, our patient Jacek Days has had Pap Smear (HPV) aka Cervical Cancer Screening completed/performed  Please assist in updating the patient chart by pulling the Care Everywhere (CE) document  The date of service is 7/15/19          05/10/21 5:05 PM    Hello, our patient Jacek Days has had CRC: Colonoscopy completed/performed  It is in patient's chart under labs from 7/9/20  Please update health maintenance  Tim Almaraz MD performed colonoscopy      Thank you,  [facility]

## 2021-05-11 NOTE — TELEPHONE ENCOUNTER
Upon review of the In Basket request and the patient's chart, initial outreach has been made via fax, please see Contacts section for details         Attempt 1 for colo      Thank you  Monica Barnes

## 2021-05-11 NOTE — LETTER
Procedure Request Form: Colonoscopy      Date Requested: 21  Patient: Sophronishreya Heman  Patient : 1960   Referring Provider: Krista Madison, DO        Date of Procedure ______________________________       The above patient has informed us that they have completed their   most recent Colonoscopy at your facility  Please complete   this form and attach all corresponding procedure reports/results  Comments __________________________________________________________  ____________________________________________________________________  ____________________________________________________________________  ____________________________________________________________________    Facility Completing Procedure _________________________________________    Form Completed By (print name) _______________________________________      Signature __________________________________________________________      These reports are needed for  compliance    Please fax this completed form and a copy of the procedure report to our office located at [place] [address] as soon as possible to [phone] attention Negro Covert: Phone [phone]    We thank you for your assistance in treating our mutual patient

## 2021-05-11 NOTE — TELEPHONE ENCOUNTER
Upon review of the In Basket request we were able to locate, review, and update the patient chart as requested for Pap Smear (HPV) aka Cervical Cancer Screening  Any additional questions or concerns should be emailed to the Practice Liaisons via [email or web address] email, please do not reply via In Basket      Thank you  Ami Campa

## 2021-05-11 NOTE — TELEPHONE ENCOUNTER
Upon review of the In Basket request we were able to locate, review, and update the patient chart as requested for CRC: Colonoscopy  Any additional questions or concerns should be emailed to the Practice Liaisons via [email or web address] email, please do not reply via In Basket      Thank you  Kristen Gibson

## 2021-05-12 LAB — INR PPP: 2.8 (ref 0.84–1.19)

## 2021-05-13 ENCOUNTER — ANTICOAG VISIT (OUTPATIENT)
Dept: CARDIOLOGY CLINIC | Facility: CLINIC | Age: 61
End: 2021-05-13

## 2021-05-13 DIAGNOSIS — Z95.2 S/P MVR (MITRAL VALVE REPLACEMENT): ICD-10-CM

## 2021-06-03 ENCOUNTER — ANTICOAG VISIT (OUTPATIENT)
Dept: CARDIOLOGY CLINIC | Facility: CLINIC | Age: 61
End: 2021-06-03

## 2021-06-03 DIAGNOSIS — Z95.2 S/P MVR (MITRAL VALVE REPLACEMENT): ICD-10-CM

## 2021-06-03 LAB — INR PPP: 2.07 (ref 0.84–1.19)

## 2021-06-06 DIAGNOSIS — I10 ESSENTIAL HYPERTENSION: ICD-10-CM

## 2021-06-06 RX ORDER — QUINAPRIL 5 1/1
TABLET ORAL
Qty: 180 TABLET | Refills: 3 | Status: SHIPPED | OUTPATIENT
Start: 2021-06-06 | End: 2022-06-01

## 2021-06-06 RX ORDER — ATENOLOL 25 MG/1
TABLET ORAL
Qty: 90 TABLET | Refills: 3 | Status: SHIPPED | OUTPATIENT
Start: 2021-06-06 | End: 2022-06-01

## 2021-06-15 LAB — INR PPP: 2.7 (ref 0.84–1.19)

## 2021-06-16 ENCOUNTER — ANTICOAG VISIT (OUTPATIENT)
Dept: CARDIOLOGY CLINIC | Facility: CLINIC | Age: 61
End: 2021-06-16

## 2021-06-16 DIAGNOSIS — Z95.2 S/P MVR (MITRAL VALVE REPLACEMENT): Primary | ICD-10-CM

## 2021-06-24 ENCOUNTER — ANTICOAG VISIT (OUTPATIENT)
Dept: CARDIOLOGY CLINIC | Facility: CLINIC | Age: 61
End: 2021-06-24

## 2021-06-24 DIAGNOSIS — Z95.2 S/P MVR (MITRAL VALVE REPLACEMENT): Primary | ICD-10-CM

## 2021-06-24 LAB — INR PPP: 2.15 (ref 0.84–1.19)

## 2021-06-28 ENCOUNTER — PREPPED CHART (OUTPATIENT)
Dept: URBAN - METROPOLITAN AREA CLINIC 6 | Facility: CLINIC | Age: 61
End: 2021-06-28

## 2021-06-29 ENCOUNTER — HOSPITAL ENCOUNTER (OUTPATIENT)
Dept: NON INVASIVE DIAGNOSTICS | Facility: CLINIC | Age: 61
Discharge: HOME | End: 2021-06-29
Payer: COMMERCIAL

## 2021-06-29 DIAGNOSIS — R00.2 PALPITATIONS: ICD-10-CM

## 2021-06-29 DIAGNOSIS — I34.0 MITRAL VALVE INSUFFICIENCY, UNSPECIFIED ETIOLOGY: ICD-10-CM

## 2021-06-29 DIAGNOSIS — Z95.2 S/P MVR (MITRAL VALVE REPLACEMENT): ICD-10-CM

## 2021-06-29 PROCEDURE — 93225 XTRNL ECG REC<48 HRS REC: CPT

## 2021-06-29 PROCEDURE — 93306 TTE W/DOPPLER COMPLETE: CPT | Performed by: INTERNAL MEDICINE

## 2021-06-29 PROCEDURE — 93306 TTE W/DOPPLER COMPLETE: CPT

## 2021-06-29 PROCEDURE — 93226 XTRNL ECG REC<48 HR SCAN A/R: CPT

## 2021-07-07 PROCEDURE — 93227 XTRNL ECG REC<48 HR R&I: CPT | Performed by: INTERNAL MEDICINE

## 2021-07-08 ENCOUNTER — ANTICOAG VISIT (OUTPATIENT)
Dept: CARDIOLOGY CLINIC | Facility: CLINIC | Age: 61
End: 2021-07-08

## 2021-07-08 DIAGNOSIS — Z95.2 S/P MVR (MITRAL VALVE REPLACEMENT): Primary | ICD-10-CM

## 2021-07-08 LAB — INR PPP: 2.8 (ref 0.84–1.19)

## 2021-07-22 ENCOUNTER — TELEPHONE (OUTPATIENT)
Dept: CARDIOLOGY CLINIC | Facility: CLINIC | Age: 61
End: 2021-07-22

## 2021-07-22 NOTE — TELEPHONE ENCOUNTER
Called patient and gave results  ----- Message from Blake Cormier MD sent at 7/13/2021  8:49 AM EDT -----  Please call patient to let her know her echo looked good  Normal cardiac function with normal functioning MVR  Thanks

## 2021-07-22 NOTE — TELEPHONE ENCOUNTER
Called patient and gave results  ----- Message from Danny Fitzgerald MD sent at 7/13/2021  8:51 AM EDT -----  Also, please call patient to let her know her holter monitor didn't demonstrated any significant rhythm issue  She did have a few very short runs of extra heart beats, which are benign  Thanks

## 2021-08-04 ENCOUNTER — ANTICOAG VISIT (OUTPATIENT)
Dept: CARDIOLOGY CLINIC | Facility: CLINIC | Age: 61
End: 2021-08-04

## 2021-08-04 DIAGNOSIS — Z95.2 S/P MVR (MITRAL VALVE REPLACEMENT): Primary | ICD-10-CM

## 2021-08-04 LAB — INR PPP: 4.26 (ref 0.84–1.19)

## 2021-08-19 LAB — INR PPP: 4.3 (ref 0.84–1.19)

## 2021-08-20 ENCOUNTER — ANTICOAG VISIT (OUTPATIENT)
Dept: CARDIOLOGY CLINIC | Facility: CLINIC | Age: 61
End: 2021-08-20

## 2021-08-20 DIAGNOSIS — Z95.2 S/P MVR (MITRAL VALVE REPLACEMENT): Primary | ICD-10-CM

## 2021-08-20 DIAGNOSIS — Z95.2 S/P AVR (AORTIC VALVE REPLACEMENT): Primary | ICD-10-CM

## 2021-08-20 RX ORDER — WARFARIN SODIUM 2.5 MG/1
TABLET ORAL
Qty: 30 TABLET | Refills: 3 | Status: SHIPPED | OUTPATIENT
Start: 2021-08-20

## 2021-08-20 RX ORDER — WARFARIN SODIUM 2.5 MG/1
TABLET ORAL
Qty: 90 TABLET | Refills: 3 | Status: SHIPPED | OUTPATIENT
Start: 2021-08-20

## 2021-09-16 ENCOUNTER — LAB REQUISITION (OUTPATIENT)
Dept: LAB | Facility: HOSPITAL | Age: 61
End: 2021-09-16
Payer: COMMERCIAL

## 2021-09-16 DIAGNOSIS — E55.9 VITAMIN D DEFICIENCY, UNSPECIFIED: ICD-10-CM

## 2021-09-16 DIAGNOSIS — D68.62 LUPUS ANTICOAGULANT SYNDROME (HCC): ICD-10-CM

## 2021-09-16 DIAGNOSIS — D69.3 IMMUNE THROMBOCYTOPENIC PURPURA (HCC): ICD-10-CM

## 2021-09-16 DIAGNOSIS — D50.8 OTHER IRON DEFICIENCY ANEMIAS: ICD-10-CM

## 2021-09-16 LAB
SARS-COV-2 IGG SERPL QL IA: REACTIVE
SARS-COV-2 IGG+IGM SERPL QL IA: REACTIVE

## 2021-09-16 PROCEDURE — 86769 SARS-COV-2 COVID-19 ANTIBODY: CPT | Performed by: INTERNAL MEDICINE

## 2021-09-20 LAB — INR PPP: 4.3 (ref 0.84–1.19)

## 2021-09-21 ENCOUNTER — ANTICOAG VISIT (OUTPATIENT)
Dept: CARDIOLOGY CLINIC | Facility: CLINIC | Age: 61
End: 2021-09-21

## 2021-09-21 DIAGNOSIS — Z95.2 S/P MVR (MITRAL VALVE REPLACEMENT): Primary | ICD-10-CM

## 2021-10-20 ENCOUNTER — LAB REQUISITION (OUTPATIENT)
Dept: LAB | Facility: HOSPITAL | Age: 61
End: 2021-10-20
Payer: COMMERCIAL

## 2021-10-20 DIAGNOSIS — D69.3 IMMUNE THROMBOCYTOPENIC PURPURA (HCC): ICD-10-CM

## 2021-10-20 LAB
EST. AVERAGE GLUCOSE BLD GHB EST-MCNC: 137 MG/DL
HBA1C MFR BLD: 6.4 %

## 2021-10-20 PROCEDURE — 83036 HEMOGLOBIN GLYCOSYLATED A1C: CPT | Performed by: INTERNAL MEDICINE

## 2021-10-26 LAB — INR PPP: 2.5 (ref 0.84–1.19)

## 2021-10-27 ENCOUNTER — ANTICOAG VISIT (OUTPATIENT)
Dept: CARDIOLOGY CLINIC | Facility: CLINIC | Age: 61
End: 2021-10-27

## 2021-10-27 DIAGNOSIS — Z95.2 S/P MVR (MITRAL VALVE REPLACEMENT): Primary | ICD-10-CM

## 2021-11-05 ENCOUNTER — CLINICAL SUPPORT (OUTPATIENT)
Dept: FAMILY MEDICINE CLINIC | Facility: CLINIC | Age: 61
End: 2021-11-05
Payer: COMMERCIAL

## 2021-11-05 DIAGNOSIS — Z23 INFLUENZA VACCINATION GIVEN: Primary | ICD-10-CM

## 2021-11-05 PROCEDURE — 90682 RIV4 VACC RECOMBINANT DNA IM: CPT

## 2021-11-05 PROCEDURE — 90471 IMMUNIZATION ADMIN: CPT

## 2021-12-10 ENCOUNTER — ANTICOAG VISIT (OUTPATIENT)
Dept: CARDIOLOGY CLINIC | Facility: CLINIC | Age: 61
End: 2021-12-10

## 2021-12-10 DIAGNOSIS — Z95.2 S/P MVR (MITRAL VALVE REPLACEMENT): Primary | ICD-10-CM

## 2021-12-10 LAB — INR PPP: 3.2 (ref 0.84–1.19)

## 2021-12-17 ENCOUNTER — OFFICE VISIT (OUTPATIENT)
Dept: CARDIOLOGY CLINIC | Facility: CLINIC | Age: 61
End: 2021-12-17
Payer: COMMERCIAL

## 2021-12-17 VITALS
HEART RATE: 71 BPM | HEIGHT: 61 IN | DIASTOLIC BLOOD PRESSURE: 78 MMHG | WEIGHT: 208.4 LBS | BODY MASS INDEX: 39.35 KG/M2 | OXYGEN SATURATION: 99 % | SYSTOLIC BLOOD PRESSURE: 142 MMHG

## 2021-12-17 DIAGNOSIS — Z95.2 S/P MVR (MITRAL VALVE REPLACEMENT): ICD-10-CM

## 2021-12-17 DIAGNOSIS — I10 ESSENTIAL HYPERTENSION: ICD-10-CM

## 2021-12-17 DIAGNOSIS — I34.0 MITRAL VALVE INSUFFICIENCY, UNSPECIFIED ETIOLOGY: Primary | ICD-10-CM

## 2021-12-17 PROCEDURE — 3008F BODY MASS INDEX DOCD: CPT | Performed by: INTERNAL MEDICINE

## 2021-12-17 PROCEDURE — 99214 OFFICE O/P EST MOD 30 MIN: CPT | Performed by: INTERNAL MEDICINE

## 2021-12-17 PROCEDURE — 3077F SYST BP >= 140 MM HG: CPT | Performed by: INTERNAL MEDICINE

## 2021-12-17 PROCEDURE — 3078F DIAST BP <80 MM HG: CPT | Performed by: INTERNAL MEDICINE

## 2021-12-17 PROCEDURE — 1036F TOBACCO NON-USER: CPT | Performed by: INTERNAL MEDICINE

## 2021-12-20 ENCOUNTER — ESTABLISHED COMPREHENSIVE EXAM (OUTPATIENT)
Dept: URBAN - METROPOLITAN AREA CLINIC 6 | Facility: CLINIC | Age: 61
End: 2021-12-20

## 2021-12-20 DIAGNOSIS — Z96.1: ICD-10-CM

## 2021-12-20 DIAGNOSIS — D31.32: ICD-10-CM

## 2021-12-20 DIAGNOSIS — M32.10: ICD-10-CM

## 2021-12-20 DIAGNOSIS — Z79.899: ICD-10-CM

## 2021-12-20 PROCEDURE — G8427 DOCREV CUR MEDS BY ELIG CLIN: HCPCS

## 2021-12-20 PROCEDURE — 1036F TOBACCO NON-USER: CPT

## 2021-12-20 PROCEDURE — 92083 EXTENDED VISUAL FIELD XM: CPT

## 2021-12-20 PROCEDURE — 92014 COMPRE OPH EXAM EST PT 1/>: CPT

## 2021-12-20 PROCEDURE — 92250 FUNDUS PHOTOGRAPHY W/I&R: CPT

## 2021-12-20 ASSESSMENT — VISUAL ACUITY
OS_SC: 20/40
OU_CC: J1+
OS_PH: 20/30
OS_CC: J1
OD_CC: J1
OD_SC: 20/30

## 2021-12-20 ASSESSMENT — TONOMETRY
OD_IOP_MMHG: 11
OS_IOP_MMHG: 12

## 2021-12-22 ENCOUNTER — VBI (OUTPATIENT)
Dept: ADMINISTRATIVE | Facility: OTHER | Age: 61
End: 2021-12-22

## 2022-01-06 LAB — INR PPP: 2.1 (ref 0.84–1.19)

## 2022-01-07 ENCOUNTER — ANTICOAG VISIT (OUTPATIENT)
Dept: CARDIOLOGY CLINIC | Facility: CLINIC | Age: 62
End: 2022-01-07

## 2022-01-07 DIAGNOSIS — Z95.2 S/P MVR (MITRAL VALVE REPLACEMENT): Primary | ICD-10-CM

## 2022-02-18 LAB — INR PPP: 2.8 (ref 0.84–1.19)

## 2022-02-21 ENCOUNTER — ANTICOAG VISIT (OUTPATIENT)
Dept: CARDIOLOGY CLINIC | Facility: CLINIC | Age: 62
End: 2022-02-21

## 2022-02-21 DIAGNOSIS — Z95.2 S/P MVR (MITRAL VALVE REPLACEMENT): Primary | ICD-10-CM

## 2022-04-06 ENCOUNTER — TELEPHONE (OUTPATIENT)
Dept: CARDIOLOGY CLINIC | Facility: CLINIC | Age: 62
End: 2022-04-06

## 2022-04-06 DIAGNOSIS — Z95.2 S/P MVR (MITRAL VALVE REPLACEMENT): Primary | ICD-10-CM

## 2022-04-06 LAB — INR PPP: 2.7 (ref 0.84–1.19)

## 2022-04-06 NOTE — TELEPHONE ENCOUNTER
Spoke to pt and advised to hold 4 days prior  Pt verbalized understanding Advised will let Vero Moody know of hold  Jackeline Press

## 2022-04-07 ENCOUNTER — ANTICOAG VISIT (OUTPATIENT)
Dept: CARDIOLOGY CLINIC | Facility: CLINIC | Age: 62
End: 2022-04-07

## 2022-04-07 DIAGNOSIS — Z95.2 S/P MVR (MITRAL VALVE REPLACEMENT): Primary | ICD-10-CM

## 2022-04-08 ENCOUNTER — OFFICE VISIT (OUTPATIENT)
Dept: URGENT CARE | Facility: MEDICAL CENTER | Age: 62
End: 2022-04-08
Payer: COMMERCIAL

## 2022-04-08 VITALS
WEIGHT: 208 LBS | DIASTOLIC BLOOD PRESSURE: 76 MMHG | TEMPERATURE: 99.5 F | SYSTOLIC BLOOD PRESSURE: 128 MMHG | BODY MASS INDEX: 39.3 KG/M2 | RESPIRATION RATE: 18 BRPM | HEART RATE: 86 BPM | OXYGEN SATURATION: 97 %

## 2022-04-08 DIAGNOSIS — B34.9 VIRAL INFECTION: Primary | ICD-10-CM

## 2022-04-08 PROCEDURE — 99213 OFFICE O/P EST LOW 20 MIN: CPT

## 2022-04-08 RX ORDER — MAGNESIUM OXIDE 400 MG/1
400 TABLET ORAL
COMMUNITY

## 2022-04-08 RX ORDER — IRON,CARBONYL/ASCORBIC ACID 100-250 MG
TABLET ORAL
COMMUNITY

## 2022-04-08 NOTE — PATIENT INSTRUCTIONS
Upper respiratory infection   - Take Vitamin D3 200IU daily, Vitamin C, and zinc  Rest and drink electrolyte rich fluids  Tylenol and ibuprofen as needed for fevers and aches following package dosing instructions  Chicken noodle soup  - Sore throat: Throat lozenges and/or salt water gurgles  - Congestion relief with mucinex 600mg 1 tablet every 12 hours  You can use afrin or flonase for nasal congestion as directed on their packaging  Warm or cool air mist humidifiers    - Nighttime cough relief with Mucinex DM or NyQuil   - Go to the ED if you have trouble breathing or shortness of breath at rest, chest pain or pressure that lasts longer than 5 minutes, become confused or hard to wake, your lips or face are blue, you have a fever of 104°F (40°C) or higher   - Follow up with Family doctor as needed, however your symptoms may persists for 2-3 weeks from onset

## 2022-04-08 NOTE — PROGRESS NOTES
[facility]        NAME: Deven Ponce is a 63 y o  female  : 1960    MRN: [id number]  DATE: 2022  TIME: 5:25 PM    Assessment and Plan   Viral infection [B34 9]  1  Viral infection           Patient Instructions   Upper respiratory infection   - Take Vitamin D3 200IU daily, Vitamin C, and zinc  Rest and drink electrolyte rich fluids  Tylenol and ibuprofen as needed for fevers and aches following package dosing instructions  Chicken noodle soup  - Sore throat: Throat lozenges and/or salt water gurgles  - Congestion relief with mucinex 600mg 1 tablet every 12 hours  You can use afrin or flonase for nasal congestion as directed on their packaging  Warm or cool air mist humidifiers    - Nighttime cough relief with Mucinex DM or NyQuil   - Go to the ED if you have trouble breathing or shortness of breath at rest, chest pain or pressure that lasts longer than 5 minutes, become confused or hard to wake, your lips or face are blue, you have a fever of 104°F (40°C) or higher   - Follow up with Family doctor as needed, however your symptoms may persists for 2-3 weeks from onset  Follow up with PCP in 3-5 days  Proceed to  ER if symptoms worsen  Chief Complaint     Chief Complaint   Patient presents with    Cough     Patient c/o cough, congestion, and earfullness x 2 days          History of Present Illness     Deven Ponce is a 63 y o  female with history significant for hypothyroidism, COPD, hypertension, mitral valve regurg with mitral valve replacement, and primary thrombocytopenia who presents with complaint of cough, congestion, and ear fullness for the past 2 days  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, and is concerned she may have something aura bronchitis  She denies fevers, chills, night sweats, shortness of breath, difficulty breathing, nausea, vomiting  She denies dysphagia or odynophagia      Review of Systems   Review of Systems   Constitutional: Positive for fever  Negative for chills and fatigue  HENT: Positive for congestion, rhinorrhea and sore throat  Negative for ear pain and sinus pressure  Respiratory: Positive for cough  Negative for shortness of breath and wheezing  Cardiovascular: Negative for chest pain and palpitations  Gastrointestinal: Negative for abdominal pain, constipation, diarrhea, nausea and vomiting  Musculoskeletal: Negative for myalgias  Neurological: Negative for headaches           Current Medications       Current Outpatient Medications:     allopurinol (ZYLOPRIM) 100 mg tablet, Take 100 mg by mouth daily , Disp: , Rfl:     ascorbic acid (VITAMIN C) 500 mg tablet, Take 500 mg by mouth daily, Disp: , Rfl:     atenolol (TENORMIN) 25 mg tablet, TAKE 1 TABLET DAILY, Disp: 90 tablet, Rfl: 3    Cholecalciferol (VITAMIN D-3) 1000 units CAPS, Take 2,000 Units by mouth daily, Disp: , Rfl:     ergocalciferol (VITAMIN D2) 50,000 units, Take by mouth once a week , Disp: , Rfl:     hydroxychloroquine (PLAQUENIL) 200 mg tablet, Take by mouth, Disp: , Rfl:     Iron-Vitamin C (Iron 100/C) 100-250 MG TABS, iron, Disp: , Rfl:     Magnesium 400 MG CAPS, Take 4 capsules by mouth daily , Disp: , Rfl:     magnesium oxide (MAG-OX) 400 mg tablet, Take 400 % by mouth, Disp: , Rfl:     predniSONE 5 mg tablet, Take 1 tablet by mouth daily (Patient not taking: Reported on 12/17/2021 ), Disp: , Rfl:     Probiotic Product (PROBIOTIC-10) CHEW, Chew, Disp: , Rfl:     quinapril (ACCUPRIL) 5 mg tablet, TAKE 2 TABLETS DAILY, Disp: 180 tablet, Rfl: 3    SYNTHROID 112 MCG tablet, Take 112 mcg by mouth daily , Disp: , Rfl:     warfarin (Coumadin) 2 5 mg tablet, TAKE 1/2 TO 1 TABLET DAILY BY MOUTH OR AS ORDERED BY PHYSICIAN , Disp: 90 tablet, Rfl: 3    warfarin (Coumadin) 2 5 mg tablet, TAKE 1/2 TO 1 TABLET DAILY BY MOUTH OR AS DIRECTED BY PHYSICIAN , Disp: 30 tablet, Rfl: 3    Current Allergies     Allergies as of 04/08/2022 - Reviewed 12/17/2021   Allergen Reaction Noted    Erythromycin  01/08/2015    Penicillins Delirium and Hives 06/14/2012    Percocet [oxycodone-acetaminophen] Nausea Only 04/24/2018            The following portions of the patient's history were reviewed and updated as appropriate: allergies, current medications, past family history, past medical history, past social history, past surgical history and problem list      Past Medical History:   Diagnosis Date    COVID-19 02/2021    Disease of thyroid gland     History of ITP     History of transfusion     Hypertension     Lyme disease     Sleep apnea        Past Surgical History:   Procedure Laterality Date    VALVE REPLACEMENT         Family History   Problem Relation Age of Onset    Diabetes Father     Other Father         Multiple myeloma     Coronary artery disease Maternal Grandmother     Diabetes Maternal Grandmother          Medications have been verified  Objective   /76   Pulse 86   Temp 99 5 °F (37 5 °C)   Resp 18   Wt 94 3 kg (208 lb)   SpO2 97%   BMI 39 30 kg/m²   No LMP recorded  Patient is postmenopausal        Physical Exam     Physical Exam  Vitals reviewed  Constitutional:       General: She is not in acute distress  Appearance: Normal appearance  She is obese  She is not ill-appearing  HENT:      Head: Normocephalic and atraumatic  Right Ear: Hearing, tympanic membrane, ear canal and external ear normal  No tenderness  There is no impacted cerumen  Tympanic membrane is not injected, perforated or erythematous  Left Ear: Hearing, tympanic membrane, ear canal and external ear normal  No tenderness  There is no impacted cerumen  Tympanic membrane is not injected, perforated or erythematous  Nose: Congestion present  No rhinorrhea  Mouth/Throat:      Lips: Pink  Mouth: Mucous membranes are moist       Pharynx: Oropharynx is clear  Uvula midline   Posterior oropharyngeal erythema and uvula swelling present  No oropharyngeal exudate  Tonsils: No tonsillar exudate or tonsillar abscesses  Eyes:      General:         Right eye: No discharge  Left eye: No discharge  Extraocular Movements: Extraocular movements intact  Conjunctiva/sclera: Conjunctivae normal       Pupils: Pupils are equal, round, and reactive to light  Cardiovascular:      Rate and Rhythm: Normal rate and regular rhythm  Heart sounds: Normal heart sounds  No murmur heard  No friction rub  No gallop  Comments: Audible click from mitral valve replacement  Pulmonary:      Effort: Pulmonary effort is normal       Breath sounds: Normal breath sounds  No wheezing, rhonchi or rales  Neurological:      Mental Status: She is alert

## 2022-04-08 NOTE — TELEPHONE ENCOUNTER
Pt called back stating that she called the physician's office that is performing her biopsy  They are okay with her stopping warfarin for 3 days  She is wondering if she can just stop the warfarin for 3 days and not do Lovenox injections

## 2022-04-21 LAB — INR PPP: 2.9 (ref 0.84–1.19)

## 2022-04-22 ENCOUNTER — ANTICOAG VISIT (OUTPATIENT)
Dept: CARDIOLOGY CLINIC | Facility: CLINIC | Age: 62
End: 2022-04-22

## 2022-04-22 DIAGNOSIS — Z95.2 S/P MVR (MITRAL VALVE REPLACEMENT): Primary | ICD-10-CM

## 2022-05-03 ENCOUNTER — APPOINTMENT (OUTPATIENT)
Dept: LAB | Facility: MEDICAL CENTER | Age: 62
End: 2022-05-03
Payer: COMMERCIAL

## 2022-05-03 DIAGNOSIS — D47.2 MGUS (MONOCLONAL GAMMOPATHY OF UNKNOWN SIGNIFICANCE): ICD-10-CM

## 2022-05-03 LAB
CRP SERPL QL: 7.9 MG/L
ERYTHROCYTE [SEDIMENTATION RATE] IN BLOOD: 90 MM/HOUR (ref 0–29)
IGA SERPL-MCNC: 1940 MG/DL (ref 70–400)
IGG SERPL-MCNC: 606 MG/DL (ref 700–1600)
IGM SERPL-MCNC: 58 MG/DL (ref 40–230)

## 2022-05-03 PROCEDURE — 86334 IMMUNOFIX E-PHORESIS SERUM: CPT | Performed by: PATHOLOGY

## 2022-05-03 PROCEDURE — 36415 COLL VENOUS BLD VENIPUNCTURE: CPT

## 2022-05-03 PROCEDURE — 85652 RBC SED RATE AUTOMATED: CPT

## 2022-05-03 PROCEDURE — 83521 IG LIGHT CHAINS FREE EACH: CPT

## 2022-05-03 PROCEDURE — 86140 C-REACTIVE PROTEIN: CPT

## 2022-05-03 PROCEDURE — 84165 PROTEIN E-PHORESIS SERUM: CPT

## 2022-05-03 PROCEDURE — 84165 PROTEIN E-PHORESIS SERUM: CPT | Performed by: PATHOLOGY

## 2022-05-03 PROCEDURE — 84166 PROTEIN E-PHORESIS/URINE/CSF: CPT | Performed by: PATHOLOGY

## 2022-05-03 PROCEDURE — 82232 ASSAY OF BETA-2 PROTEIN: CPT

## 2022-05-03 PROCEDURE — 86334 IMMUNOFIX E-PHORESIS SERUM: CPT

## 2022-05-03 PROCEDURE — 82784 ASSAY IGA/IGD/IGG/IGM EACH: CPT

## 2022-05-04 LAB
KAPPA LC FREE SER-MCNC: 25.7 MG/L (ref 3.3–19.4)
KAPPA LC FREE/LAMBDA FREE SER: 1.52 {RATIO} (ref 0.26–1.65)
LAMBDA LC FREE SERPL-MCNC: 16.9 MG/L (ref 5.7–26.3)

## 2022-05-05 LAB
ALBUMIN SERPL ELPH-MCNC: 3.91 G/DL (ref 3.5–5)
ALBUMIN SERPL ELPH-MCNC: 50.1 % (ref 52–65)
ALPHA1 GLOB SERPL ELPH-MCNC: 0.3 G/DL (ref 0.1–0.4)
ALPHA1 GLOB SERPL ELPH-MCNC: 3.9 % (ref 2.5–5)
ALPHA2 GLOB SERPL ELPH-MCNC: 0.52 G/DL (ref 0.4–1.2)
ALPHA2 GLOB SERPL ELPH-MCNC: 6.7 % (ref 7–13)
B2 MICROGLOB SERPL-MCNC: 3.6 MG/L (ref 0.6–2.4)
BETA GLOB ABNORMAL SERPL ELPH-MCNC: 0.66 G/DL (ref 0.4–0.8)
BETA1 GLOB SERPL ELPH-MCNC: 8.4 % (ref 5–13)
BETA2 GLOB SERPL ELPH-MCNC: 23.9 % (ref 2–8)
BETA2+GAMMA GLOB SERPL ELPH-MCNC: 1.86 G/DL (ref 0.2–0.5)
GAMMA GLOB ABNORMAL SERPL ELPH-MCNC: 0.55 G/DL (ref 0.5–1.6)
GAMMA GLOB SERPL ELPH-MCNC: 7 % (ref 12–22)
IGG/ALB SER: 1 {RATIO} (ref 1.1–1.8)
INTERPRETATION UR IFE-IMP: NORMAL
KAPPA LC UR-MCNC: 9.91 MG/L (ref 1.17–86.46)
KAPPA LC/LAMBDA UR: 1.6 {RATIO} (ref 1.83–14.26)
LAMBDA LC UR-MCNC: 6.21 MG/L (ref 0.27–15.21)
M PROTEIN 1 MFR SERPL ELPH: 15.1 %
M PROTEIN 1 SERPL ELPH-MCNC: 1.18 G/DL
PROT PATTERN SERPL ELPH-IMP: ABNORMAL
PROT SERPL-MCNC: 7.8 G/DL (ref 6.4–8.2)

## 2022-05-09 ENCOUNTER — LAB REQUISITION (OUTPATIENT)
Dept: LAB | Facility: HOSPITAL | Age: 62
End: 2022-05-09
Payer: COMMERCIAL

## 2022-05-09 DIAGNOSIS — D50.8 OTHER IRON DEFICIENCY ANEMIAS: ICD-10-CM

## 2022-05-09 DIAGNOSIS — N18.30 CHRONIC KIDNEY DISEASE, STAGE 3 UNSPECIFIED (HCC): ICD-10-CM

## 2022-05-09 DIAGNOSIS — D69.3 IMMUNE THROMBOCYTOPENIC PURPURA (HCC): ICD-10-CM

## 2022-05-09 DIAGNOSIS — D47.2 MONOCLONAL GAMMOPATHY: ICD-10-CM

## 2022-05-09 LAB
BACTERIA UR QL AUTO: NORMAL /HPF
BILIRUB UR QL STRIP: NEGATIVE
CLARITY UR: CLEAR
COLOR UR: ABNORMAL
GLUCOSE UR STRIP-MCNC: NEGATIVE MG/DL
HGB UR QL STRIP.AUTO: NEGATIVE
KETONES UR STRIP-MCNC: NEGATIVE MG/DL
LEUKOCYTE ESTERASE UR QL STRIP: NEGATIVE
NITRITE UR QL STRIP: NEGATIVE
NON-SQ EPI CELLS URNS QL MICRO: NORMAL /HPF
PH UR STRIP.AUTO: 6.5 [PH]
PROT UR STRIP-MCNC: ABNORMAL MG/DL
RBC #/AREA URNS AUTO: NORMAL /HPF
SP GR UR STRIP.AUTO: 1.01 (ref 1–1.03)
UROBILINOGEN UR STRIP-ACNC: <2 MG/DL
WBC #/AREA URNS AUTO: NORMAL /HPF

## 2022-05-09 PROCEDURE — 81001 URINALYSIS AUTO W/SCOPE: CPT | Performed by: INTERNAL MEDICINE

## 2022-05-20 ENCOUNTER — HOSPITAL ENCOUNTER (OUTPATIENT)
Dept: NUCLEAR MEDICINE | Facility: HOSPITAL | Age: 62
Discharge: HOME | End: 2022-05-20
Payer: COMMERCIAL

## 2022-05-20 DIAGNOSIS — C90.00 MULTIPLE MYELOMA, REMISSION STATUS UNSPECIFIED (HCC): ICD-10-CM

## 2022-05-20 LAB — GLUCOSE SERPL-MCNC: 109 MG/DL (ref 65–140)

## 2022-05-20 PROCEDURE — A9552 F18 FDG: HCPCS

## 2022-05-20 PROCEDURE — 78815 PET IMAGE W/CT SKULL-THIGH: CPT

## 2022-05-20 PROCEDURE — G1004 CDSM NDSC: HCPCS

## 2022-05-20 PROCEDURE — 82948 REAGENT STRIP/BLOOD GLUCOSE: CPT

## 2022-06-01 DIAGNOSIS — I10 ESSENTIAL HYPERTENSION: ICD-10-CM

## 2022-06-01 RX ORDER — ATENOLOL 25 MG/1
TABLET ORAL
Qty: 90 TABLET | Refills: 3 | Status: SHIPPED | OUTPATIENT
Start: 2022-06-01

## 2022-06-01 RX ORDER — QUINAPRIL 5 1/1
TABLET ORAL
Qty: 180 TABLET | Refills: 3 | Status: SHIPPED | OUTPATIENT
Start: 2022-06-01

## 2022-06-02 ENCOUNTER — APPOINTMENT (OUTPATIENT)
Dept: LAB | Facility: MEDICAL CENTER | Age: 62
End: 2022-06-02
Payer: COMMERCIAL

## 2022-06-02 DIAGNOSIS — C90.00 MYELOMA ASSOCIATED AMYLOIDOSIS (HCC): ICD-10-CM

## 2022-06-02 DIAGNOSIS — Z01.84 IMMUNITY STATUS TESTING: ICD-10-CM

## 2022-06-02 DIAGNOSIS — M32.10 SYSTEMIC LUPUS ERYTHEMATOSUS, ORGAN OR SYSTEM INVOLVEMENT UNSPECIFIED (HCC): ICD-10-CM

## 2022-06-02 DIAGNOSIS — M32.10 SYSTEMIC LUPUS ERYTHEMATOSUS WITH ORGAN SYSTEM INVOLVEMENT (HCC): Primary | ICD-10-CM

## 2022-06-02 DIAGNOSIS — E85.9 MYELOMA ASSOCIATED AMYLOIDOSIS (HCC): ICD-10-CM

## 2022-06-02 DIAGNOSIS — N18.30 STAGE 3 CHRONIC KIDNEY DISEASE, UNSPECIFIED WHETHER STAGE 3A OR 3B CKD (HCC): ICD-10-CM

## 2022-06-02 DIAGNOSIS — D69.3 IMMUNE THROMBOCYTOPENIC PURPURA (HCC): ICD-10-CM

## 2022-06-02 DIAGNOSIS — D47.2 MONOCLONAL PARAPROTEINEMIA: ICD-10-CM

## 2022-06-02 DIAGNOSIS — D50.8 IRON DEFICIENCY ANEMIA SECONDARY TO INADEQUATE DIETARY IRON INTAKE: ICD-10-CM

## 2022-06-02 DIAGNOSIS — E79.0 HYPERURICEMIA WITHOUT SIGNS INFLAMMATORY ARTHRITIS/TOPHACEOUS DISEASE: ICD-10-CM

## 2022-06-02 DIAGNOSIS — C90.00 MULTIPLE MYELOMA NOT HAVING ACHIEVED REMISSION (HCC): ICD-10-CM

## 2022-06-02 LAB
ALBUMIN SERPL BCP-MCNC: 3.3 G/DL (ref 3.5–5)
ALP SERPL-CCNC: 81 U/L (ref 46–116)
ALT SERPL W P-5'-P-CCNC: 29 U/L (ref 12–78)
ANION GAP SERPL CALCULATED.3IONS-SCNC: 4 MMOL/L (ref 4–13)
AST SERPL W P-5'-P-CCNC: 37 U/L (ref 5–45)
BASOPHILS # BLD AUTO: 0.03 THOUSANDS/ΜL (ref 0–0.1)
BASOPHILS NFR BLD AUTO: 1 % (ref 0–1)
BILIRUB SERPL-MCNC: 0.22 MG/DL (ref 0.2–1)
BUN SERPL-MCNC: 30 MG/DL (ref 5–25)
CALCIUM ALBUM COR SERPL-MCNC: 10.2 MG/DL (ref 8.3–10.1)
CALCIUM SERPL-MCNC: 9.6 MG/DL (ref 8.3–10.1)
CHLORIDE SERPL-SCNC: 108 MMOL/L (ref 100–108)
CO2 SERPL-SCNC: 26 MMOL/L (ref 21–32)
CREAT SERPL-MCNC: 1.74 MG/DL (ref 0.6–1.3)
CRP SERPL QL: 5.8 MG/L
EOSINOPHIL # BLD AUTO: 0.17 THOUSAND/ΜL (ref 0–0.61)
EOSINOPHIL NFR BLD AUTO: 3 % (ref 0–6)
ERYTHROCYTE [DISTWIDTH] IN BLOOD BY AUTOMATED COUNT: 15.4 % (ref 11.6–15.1)
ERYTHROCYTE [SEDIMENTATION RATE] IN BLOOD: 92 MM/HOUR (ref 0–29)
FERRITIN SERPL-MCNC: 76 NG/ML (ref 8–388)
GFR SERPL CREATININE-BSD FRML MDRD: 31 ML/MIN/1.73SQ M
GLUCOSE SERPL-MCNC: 106 MG/DL (ref 65–140)
HCT VFR BLD AUTO: 33 % (ref 34.8–46.1)
HGB BLD-MCNC: 10.8 G/DL (ref 11.5–15.4)
IGA SERPL-MCNC: 1990 MG/DL (ref 70–400)
IGG SERPL-MCNC: 620 MG/DL (ref 700–1600)
IGM SERPL-MCNC: 37 MG/DL (ref 40–230)
IMM GRANULOCYTES # BLD AUTO: 0.02 THOUSAND/UL (ref 0–0.2)
IMM GRANULOCYTES NFR BLD AUTO: 0 % (ref 0–2)
IRON SATN MFR SERPL: 13 % (ref 15–50)
IRON SERPL-MCNC: 45 UG/DL (ref 50–170)
LYMPHOCYTES # BLD AUTO: 0.81 THOUSANDS/ΜL (ref 0.6–4.47)
LYMPHOCYTES NFR BLD AUTO: 14 % (ref 14–44)
MCH RBC QN AUTO: 27.7 PG (ref 26.8–34.3)
MCHC RBC AUTO-ENTMCNC: 32.7 G/DL (ref 31.4–37.4)
MCV RBC AUTO: 85 FL (ref 82–98)
MONOCYTES # BLD AUTO: 0.36 THOUSAND/ΜL (ref 0.17–1.22)
MONOCYTES NFR BLD AUTO: 6 % (ref 4–12)
NEUTROPHILS # BLD AUTO: 4.62 THOUSANDS/ΜL (ref 1.85–7.62)
NEUTS SEG NFR BLD AUTO: 76 % (ref 43–75)
NRBC BLD AUTO-RTO: 0 /100 WBCS
PLATELET # BLD AUTO: 129 THOUSANDS/UL (ref 149–390)
PMV BLD AUTO: 11.6 FL (ref 8.9–12.7)
POTASSIUM SERPL-SCNC: 4 MMOL/L (ref 3.5–5.3)
PROT SERPL-MCNC: 7.9 G/DL (ref 6.4–8.2)
RBC # BLD AUTO: 3.9 MILLION/UL (ref 3.81–5.12)
SODIUM SERPL-SCNC: 138 MMOL/L (ref 136–145)
TIBC SERPL-MCNC: 347 UG/DL (ref 250–450)
URATE SERPL-MCNC: 6.4 MG/DL (ref 2–6.8)
WBC # BLD AUTO: 6.01 THOUSAND/UL (ref 4.31–10.16)

## 2022-06-02 PROCEDURE — 83521 IG LIGHT CHAINS FREE EACH: CPT

## 2022-06-02 PROCEDURE — 82784 ASSAY IGA/IGD/IGG/IGM EACH: CPT

## 2022-06-02 PROCEDURE — 84166 PROTEIN E-PHORESIS/URINE/CSF: CPT | Performed by: PATHOLOGY

## 2022-06-02 PROCEDURE — 86769 SARS-COV-2 COVID-19 ANTIBODY: CPT

## 2022-06-02 PROCEDURE — 80053 COMPREHEN METABOLIC PANEL: CPT

## 2022-06-02 PROCEDURE — 85025 COMPLETE CBC W/AUTO DIFF WBC: CPT

## 2022-06-02 PROCEDURE — 83883 ASSAY NEPHELOMETRY NOT SPEC: CPT

## 2022-06-02 PROCEDURE — 82232 ASSAY OF BETA-2 PROTEIN: CPT

## 2022-06-02 PROCEDURE — 82728 ASSAY OF FERRITIN: CPT

## 2022-06-02 PROCEDURE — 86140 C-REACTIVE PROTEIN: CPT

## 2022-06-02 PROCEDURE — 85652 RBC SED RATE AUTOMATED: CPT

## 2022-06-02 PROCEDURE — 83540 ASSAY OF IRON: CPT

## 2022-06-02 PROCEDURE — 84550 ASSAY OF BLOOD/URIC ACID: CPT

## 2022-06-02 PROCEDURE — 83550 IRON BINDING TEST: CPT

## 2022-06-03 ENCOUNTER — ANTICOAG VISIT (OUTPATIENT)
Dept: CARDIOLOGY CLINIC | Facility: CLINIC | Age: 62
End: 2022-06-03

## 2022-06-03 DIAGNOSIS — Z95.2 S/P MVR (MITRAL VALVE REPLACEMENT): Primary | ICD-10-CM

## 2022-06-03 LAB
SARS-COV-2 IGG SERPL QL IA: REACTIVE
SARS-COV-2 IGG+IGM SERPL QL IA: REACTIVE

## 2022-06-05 LAB — B2 MICROGLOB SERPL-MCNC: 3.8 MG/L (ref 0.6–2.4)

## 2022-06-06 ENCOUNTER — APPOINTMENT (OUTPATIENT)
Dept: LAB | Facility: MEDICAL CENTER | Age: 62
End: 2022-06-06
Payer: COMMERCIAL

## 2022-06-06 DIAGNOSIS — D47.2 MONOCLONAL PARAPROTEINEMIA: ICD-10-CM

## 2022-06-06 DIAGNOSIS — D69.3 IMMUNE THROMBOCYTOPENIC PURPURA (HCC): ICD-10-CM

## 2022-06-06 DIAGNOSIS — N18.30 STAGE 3 CHRONIC KIDNEY DISEASE, UNSPECIFIED WHETHER STAGE 3A OR 3B CKD (HCC): ICD-10-CM

## 2022-06-06 DIAGNOSIS — C90.00 MULTIPLE MYELOMA, REMISSION STATUS UNSPECIFIED (HCC): ICD-10-CM

## 2022-06-06 LAB
PROT 24H UR-MCNC: 322 MG/24 HRS (ref 40–150)
SPECIMEN VOL UR: 1400 ML

## 2022-06-06 PROCEDURE — 83521 IG LIGHT CHAINS FREE EACH: CPT

## 2022-06-06 PROCEDURE — 84156 ASSAY OF PROTEIN URINE: CPT

## 2022-06-08 LAB
KAPPA LC UR-MCNC: 8.71 MG/L (ref 1.17–86.46)
KAPPA LC/LAMBDA UR: 5.41 {RATIO} (ref 1.83–14.26)
LAMBDA LC UR-MCNC: 1.61 MG/L (ref 0.27–15.21)

## 2022-06-13 LAB
KAPPA LC FREE SER-MCNC: NORMAL MG/L
MISCELLANEOUS LAB TEST RESULT: NORMAL

## 2022-06-21 ENCOUNTER — HOSPITAL ENCOUNTER (OUTPATIENT)
Dept: MRI IMAGING | Facility: HOSPITAL | Age: 62
Discharge: HOME | End: 2022-06-21
Attending: INTERNAL MEDICINE
Payer: COMMERCIAL

## 2022-06-21 DIAGNOSIS — C90.00 MULTIPLE MYELOMA NOT HAVING ACHIEVED REMISSION (HCC): ICD-10-CM

## 2022-06-21 PROCEDURE — 72148 MRI LUMBAR SPINE W/O DYE: CPT

## 2022-07-19 ENCOUNTER — ANTICOAG VISIT (OUTPATIENT)
Dept: CARDIOLOGY CLINIC | Facility: CLINIC | Age: 62
End: 2022-07-19

## 2022-07-19 DIAGNOSIS — Z95.2 S/P MVR (MITRAL VALVE REPLACEMENT): Primary | ICD-10-CM

## 2022-07-19 LAB — INR PPP: 1.6 (ref 0.84–1.19)

## 2022-07-22 ENCOUNTER — HOSPITAL ENCOUNTER (OUTPATIENT)
Dept: MRI IMAGING | Facility: HOSPITAL | Age: 62
Discharge: HOME | End: 2022-07-22
Attending: INTERNAL MEDICINE
Payer: COMMERCIAL

## 2022-07-22 DIAGNOSIS — C90.00 MULTIPLE MYELOMA NOT HAVING ACHIEVED REMISSION (HCC): ICD-10-CM

## 2022-07-22 PROCEDURE — 72141 MRI NECK SPINE W/O DYE: CPT

## 2022-07-22 PROCEDURE — 72146 MRI CHEST SPINE W/O DYE: CPT

## 2022-08-02 LAB — INR PPP: 2.8 (ref 0.84–1.19)

## 2022-08-03 ENCOUNTER — ANTICOAG VISIT (OUTPATIENT)
Dept: CARDIOLOGY CLINIC | Facility: CLINIC | Age: 62
End: 2022-08-03

## 2022-08-03 DIAGNOSIS — Z95.2 S/P MVR (MITRAL VALVE REPLACEMENT): Primary | ICD-10-CM

## 2022-08-04 ENCOUNTER — TELEPHONE (OUTPATIENT)
Dept: CARDIOLOGY CLINIC | Facility: CLINIC | Age: 62
End: 2022-08-04

## 2022-08-04 NOTE — TELEPHONE ENCOUNTER
Dr Gilmar Delvalle called spoke with you earlier today, decided not to biopsy, going to treat her  If you want to see his note it will be in care everywhere or if need to speak with him can call office

## 2022-08-18 ENCOUNTER — ANTICOAG VISIT (OUTPATIENT)
Dept: CARDIOLOGY CLINIC | Facility: CLINIC | Age: 62
End: 2022-08-18

## 2022-08-18 DIAGNOSIS — Z95.2 S/P MVR (MITRAL VALVE REPLACEMENT): Primary | ICD-10-CM

## 2022-08-18 LAB — INR PPP: 3.2 (ref 0.84–1.19)

## 2022-08-26 ENCOUNTER — OFFICE VISIT (OUTPATIENT)
Dept: CARDIOLOGY CLINIC | Facility: CLINIC | Age: 62
End: 2022-08-26
Payer: COMMERCIAL

## 2022-08-26 VITALS
HEART RATE: 54 BPM | WEIGHT: 197.7 LBS | DIASTOLIC BLOOD PRESSURE: 80 MMHG | BODY MASS INDEX: 37.33 KG/M2 | SYSTOLIC BLOOD PRESSURE: 158 MMHG | HEIGHT: 61 IN

## 2022-08-26 DIAGNOSIS — I34.0 MITRAL VALVE INSUFFICIENCY, UNSPECIFIED ETIOLOGY: ICD-10-CM

## 2022-08-26 DIAGNOSIS — D69.3 IDIOPATHIC THROMBOCYTOPENIC PURPURA (HCC): ICD-10-CM

## 2022-08-26 DIAGNOSIS — I10 ESSENTIAL HYPERTENSION: Primary | ICD-10-CM

## 2022-08-26 DIAGNOSIS — Z95.2 S/P MVR (MITRAL VALVE REPLACEMENT): ICD-10-CM

## 2022-08-26 PROCEDURE — 3077F SYST BP >= 140 MM HG: CPT | Performed by: INTERNAL MEDICINE

## 2022-08-26 PROCEDURE — 99214 OFFICE O/P EST MOD 30 MIN: CPT | Performed by: INTERNAL MEDICINE

## 2022-08-26 PROCEDURE — 3079F DIAST BP 80-89 MM HG: CPT | Performed by: INTERNAL MEDICINE

## 2022-08-26 RX ORDER — VALSARTAN 160 MG/1
160 TABLET ORAL DAILY
Qty: 90 TABLET | Refills: 3 | Status: SHIPPED | OUTPATIENT
Start: 2022-08-26

## 2022-08-26 RX ORDER — MULTIVITAMIN
1 CAPSULE ORAL DAILY
COMMUNITY

## 2022-08-26 RX ORDER — ATENOLOL 25 MG/1
25 TABLET ORAL DAILY
Qty: 90 TABLET | Refills: 3 | Status: SHIPPED | OUTPATIENT
Start: 2022-08-26

## 2022-08-26 NOTE — PATIENT INSTRUCTIONS
Recommendations:  1  Discontinue accupril  2  Start valsartan 160mg daily  3  Continue remainder of medications  4  Follow up in 6 months

## 2022-08-26 NOTE — PROGRESS NOTES
Cardiology   Ayala Urban 63 y o  female MRN: [id number]          Impression:  1  S/P mechanical MVR - doing well   On Warfarin  2  ITP - will discontinue ASA   Patient has no CAD  3  Hypertension - elevated  4  Palpitations - resolved       Recommendations:  1  Discontinue accupril  2  Start valsartan 160mg daily  3  Continue remainder of medications  4  Follow up in 6 months  HPI: Ayala Urban is a 63y o  year old female with a history of mechancial MVR, HTN, SLE, ITP, who presents for follow up   Echocardiogram 4/19 demonstrated normal LV systolic function with normal MVR  Diagnosed with multiple myeloma, and is starting chemo  No further palpitations  No chest pain, shortness of breath  Echo 2021 demonstrated normal MVR, and holter demonstrated no significant dysrhythmia  Review of Systems   Constitutional: Negative  HENT: Negative  Eyes: Negative  Respiratory: Negative for chest tightness and shortness of breath  Cardiovascular: Negative for chest pain, palpitations and leg swelling  Gastrointestinal: Negative  Endocrine: Negative  Genitourinary: Negative  Musculoskeletal: Negative  Skin: Negative  Allergic/Immunologic: Negative  Neurological: Negative  Hematological: Negative  Psychiatric/Behavioral: Negative  All other systems reviewed and are negative          Past Medical History:   Diagnosis Date    COVID-19 02/2021    Disease of thyroid gland     History of ITP     History of transfusion     Hypertension     Lyme disease     Sleep apnea      Past Surgical History:   Procedure Laterality Date    VALVE REPLACEMENT       Social History     Substance and Sexual Activity   Alcohol Use Yes    Comment: Social     Social History     Substance and Sexual Activity   Drug Use No     Social History     Tobacco Use   Smoking Status Never Smoker   Smokeless Tobacco Never Used     Family History   Problem Relation Age of Onset    Diabetes Father     Other Father         Multiple myeloma     Coronary artery disease Maternal Grandmother     Diabetes Maternal Grandmother        Allergies:   Allergies   Allergen Reactions    Erythromycin     Penicillins Delirium and Hives    Percocet [Oxycodone-Acetaminophen] Nausea Only       Medications:     Current Outpatient Medications:     allopurinol (ZYLOPRIM) 100 mg tablet, Take 100 mg by mouth daily , Disp: , Rfl:     ascorbic acid (VITAMIN C) 500 mg tablet, Take 500 mg by mouth daily, Disp: , Rfl:     atenolol (TENORMIN) 25 mg tablet, TAKE 1 TABLET DAILY, Disp: 90 tablet, Rfl: 3    Cholecalciferol (VITAMIN D-3) 1000 units CAPS, Take 2,000 Units by mouth daily, Disp: , Rfl:     ergocalciferol (VITAMIN D2) 50,000 units, Take by mouth once a week , Disp: , Rfl:     hydroxychloroquine (PLAQUENIL) 200 mg tablet, Take by mouth, Disp: , Rfl:     Iron-Vitamin C 100-250 MG TABS, iron, Disp: , Rfl:     Magnesium 400 MG CAPS, Take 4 capsules by mouth daily , Disp: , Rfl:     Multiple Vitamin (multivitamin) capsule, Take 1 capsule by mouth daily, Disp: , Rfl:     Probiotic Product (PROBIOTIC-10) CHEW, Chew, Disp: , Rfl:     quinapril (ACCUPRIL) 5 mg tablet, TAKE 2 TABLETS DAILY, Disp: 180 tablet, Rfl: 3    SYNTHROID 112 MCG tablet, Take 112 mcg by mouth daily , Disp: , Rfl:     warfarin (Coumadin) 2 5 mg tablet, TAKE 1/2 TO 1 TABLET DAILY BY MOUTH OR AS ORDERED BY PHYSICIAN , Disp: 90 tablet, Rfl: 3    warfarin (Coumadin) 2 5 mg tablet, TAKE 1/2 TO 1 TABLET DAILY BY MOUTH OR AS DIRECTED BY PHYSICIAN , Disp: 30 tablet, Rfl: 3    magnesium oxide (MAG-OX) 400 mg tablet, Take 400 % by mouth (Patient not taking: No sig reported), Disp: , Rfl:     predniSONE 5 mg tablet, Take 1 tablet by mouth daily (Patient not taking: No sig reported), Disp: , Rfl:       Wt Readings from Last 3 Encounters:   08/26/22 89 7 kg (197 lb 11 2 oz)   04/08/22 94 3 kg (208 lb)   12/17/21 94 5 kg (208 lb 6 4 oz)     Temp Readings from Last 3 Encounters:   22 99 5 °F (37 5 °C)   05/10/21 97 5 °F (36 4 °C) (Temporal)   20 97 8 °F (36 6 °C) (Temporal)     BP Readings from Last 3 Encounters:   22 158/80   22 128/76   21 142/78     Pulse Readings from Last 3 Encounters:   22 (!) 54   22 86   21 71         Physical Exam  HENT:      Head: Atraumatic  Mouth/Throat:      Mouth: Mucous membranes are moist    Eyes:      Extraocular Movements: Extraocular movements intact  Cardiovascular:      Rate and Rhythm: Normal rate  Comments: Mechanical heart sounds  Pulmonary:      Effort: Pulmonary effort is normal       Breath sounds: Normal breath sounds  Abdominal:      General: Abdomen is flat  Musculoskeletal:         General: Normal range of motion  Cervical back: Normal range of motion  Skin:     General: Skin is warm  Neurological:      General: No focal deficit present  Mental Status: She is alert and oriented to person, place, and time     Psychiatric:         Mood and Affect: Mood normal          Behavior: Behavior normal            Laboratory Studies:  CMP:  Lab Results   Component Value Date    K 4 0 2022     2022    CO2 26 2022    BUN 30 (H) 2022    CREATININE 1 74 (H) 2022    AST 37 2022    ALT 29 2022    EGFR 31 2022         Cardiac testing:   EKG reviewed personally:  Results for orders placed during the hospital encounter of 21    Echo complete with contrast if indicated    Narrative  [address]  [phone]    Transthoracic Echocardiogram  2D, M-mode, Doppler, and Color Doppler    Study date:  2021    Patient: Luisa Walters  MR number: [id number]  Account number: [de-identified]  : 1960  Age: 64 years  Gender: Female  Status: Outpatient  Location: [address] [place] [address] [facility]  Height: 61 in  Weight: 211 lb  BP: 148/ 90 mmHg    Indications: Palpitations    Diagnoses: R00 2 - Palpitations    Sonographer:  SHRADDHA Turcios  Primary Physician:  Miguelina Barnes DO  Referring Physician:  Silvana Cheema MD  Group:  [facility]  Interpreting Physician:  Ofelia Erazo MD    SUMMARY    LEFT VENTRICLE:  Systolic function was normal  Ejection fraction was estimated to be 60 %  There were no regional wall motion abnormalities  MITRAL VALVE:  A [employer] mechanical prosthesis was present  It exhibited normal function  Mean transmitral gradient was 3 mmHg  AORTIC VALVE:  There was no evidence for stenosis  TRICUSPID VALVE:  There was trace regurgitation  Pulmonary artery systolic pressure was mildly increased  Estimated peak PA pressure was 38 mmHg  HISTORY: PRIOR HISTORY: Hypertension, mechanical mitral valve replacement, sleep apnea, COVID-19    PROCEDURE: The study was performed in the [address] [facility]  This was a routine study  The transthoracic approach was used  The study included complete 2D imaging, M-mode, complete spectral Doppler, and color Doppler  Image  quality was adequate  LEFT VENTRICLE: Size was normal  Systolic function was normal  Ejection fraction was estimated to be 60 %  There were no regional wall motion abnormalities  Wall thickness was normal  DOPPLER: The study was not technically sufficient to  allow evaluation of LV diastolic function  RIGHT VENTRICLE: The size was normal  Systolic function was normal  Wall thickness was normal     LEFT ATRIUM: Size was at the upper limits of normal     RIGHT ATRIUM: Size was normal     MITRAL VALVE: There was normal leaflet separation  A [employer] mechanical prosthesis was present  It exhibited normal function  DOPPLER: The transmitral velocity was within the normal range  There was no evidence for stenosis  There was no  significant regurgitation  AORTIC VALVE: The valve was trileaflet   Leaflets exhibited normal thickness and normal cuspal separation  DOPPLER: Transaortic velocity was within the normal range  There was no evidence for stenosis  There was no regurgitation  TRICUSPID VALVE: The valve structure was normal  There was normal leaflet separation  DOPPLER: The transtricuspid velocity was within the normal range  There was no evidence for stenosis  There was trace regurgitation  Pulmonary artery  systolic pressure was mildly increased  Estimated peak PA pressure was 38 mmHg  PULMONIC VALVE: Leaflets exhibited normal thickness, no calcification, and normal cuspal separation  DOPPLER: The transpulmonic velocity was within the normal range  There was mild regurgitation  PERICARDIUM: There was no pericardial effusion  The pericardium was normal in appearance  AORTA: The root exhibited normal size  SYSTEMIC VEINS: IVC: The inferior vena cava was normal in size  MEASUREMENT TABLES    DOPPLER MEASUREMENTS  Mitral valve   (Reference normals)  Mean gradient   3 mmHg   (--)    SYSTEM MEASUREMENT TABLES    2D  %FS: 36 11 %  Ao Diam: 3 17 cm  EDV(Teich): 121 95 ml  EF(Teich): 65 45 %  ESV(Teich): 42 13 ml  IVSd: 1 03 cm  LA Area: 21 06 cm2  LA Diam: 3 99 cm  LVEDV MOD A4C: 141 74 ml  LVEF MOD A4C: 69 35 %  LVESV MOD A4C: 43 44 ml  LVIDd: 5 07 cm  LVIDs: 3 24 cm  LVLd A4C: 7 49 cm  LVLs A4C: 6 06 cm  LVPWd: 1 12 cm  RA Area: 12 76 cm2  RVIDd: 3 93 cm  SV MOD A4C: 98 29 ml  SV(Teich): 79 82 ml    CW  TR Vmax: 2 87 m/s  TR maxP 92 mmHg    MM  TAPSE: 2 23 cm    PW  E' Sept: 0 07 m/s  E/E' Sept: 23 71  MV A Bobby: 1 15 m/s  MV Dec Jerauld: 4 71 m/s2  MV DecT: 363 21 ms  MV E Bobby: 1 71 m/s  MV E/A Ratio: 1 49  MV PHT: 105 33 ms  MVA By PHT: 2 09 cm2    [employer] Accredited [facility]    Prepared and electronically signed by    Phyllis Kanner, MD  Signed 2021 15:28:27    No results found for this or any previous visit  No results found for this or any previous visit      No results found for this or any previous visit

## 2022-09-21 LAB — INR PPP: 2.8 (ref 0.84–1.19)

## 2022-09-22 ENCOUNTER — ANTICOAG VISIT (OUTPATIENT)
Dept: CARDIOLOGY CLINIC | Facility: CLINIC | Age: 62
End: 2022-09-22

## 2022-09-22 DIAGNOSIS — Z95.2 S/P MVR (MITRAL VALVE REPLACEMENT): Primary | ICD-10-CM

## 2022-09-28 LAB — INR PPP: 3.2 (ref 0.84–1.19)

## 2022-09-29 ENCOUNTER — ANTICOAG VISIT (OUTPATIENT)
Dept: CARDIOLOGY CLINIC | Facility: CLINIC | Age: 62
End: 2022-09-29

## 2022-09-29 DIAGNOSIS — Z95.2 S/P MVR (MITRAL VALVE REPLACEMENT): Primary | ICD-10-CM

## 2022-10-05 LAB — INR PPP: 3.75 (ref 0.84–1.19)

## 2022-10-06 ENCOUNTER — ANTICOAG VISIT (OUTPATIENT)
Dept: CARDIOLOGY CLINIC | Facility: CLINIC | Age: 62
End: 2022-10-06

## 2022-10-06 DIAGNOSIS — Z95.2 S/P MVR (MITRAL VALVE REPLACEMENT): Primary | ICD-10-CM

## 2022-10-19 LAB — INR PPP: 3.8 (ref 0.84–1.19)

## 2022-10-20 ENCOUNTER — ANTICOAG VISIT (OUTPATIENT)
Dept: CARDIOLOGY CLINIC | Facility: CLINIC | Age: 62
End: 2022-10-20
Payer: COMMERCIAL

## 2022-10-20 DIAGNOSIS — Z95.2 S/P MVR (MITRAL VALVE REPLACEMENT): Primary | ICD-10-CM

## 2022-10-20 PROCEDURE — 93793 ANTICOAG MGMT PT WARFARIN: CPT | Performed by: INTERNAL MEDICINE

## 2022-11-30 LAB — INR PPP: 2.1 (ref 0.84–1.19)

## 2022-12-01 ENCOUNTER — ANTICOAG VISIT (OUTPATIENT)
Dept: CARDIOLOGY CLINIC | Facility: CLINIC | Age: 62
End: 2022-12-01

## 2022-12-01 DIAGNOSIS — Z95.2 S/P MVR (MITRAL VALVE REPLACEMENT): Primary | ICD-10-CM

## 2022-12-14 LAB — INR PPP: 1.8 (ref 0.84–1.19)

## 2022-12-16 ENCOUNTER — ANTICOAG VISIT (OUTPATIENT)
Dept: CARDIOLOGY CLINIC | Facility: CLINIC | Age: 62
End: 2022-12-16

## 2022-12-16 DIAGNOSIS — Z95.2 S/P MVR (MITRAL VALVE REPLACEMENT): Primary | ICD-10-CM

## 2022-12-29 ENCOUNTER — ANTICOAG VISIT (OUTPATIENT)
Dept: CARDIOLOGY CLINIC | Facility: CLINIC | Age: 62
End: 2022-12-29

## 2022-12-29 DIAGNOSIS — Z95.2 S/P MVR (MITRAL VALVE REPLACEMENT): Primary | ICD-10-CM

## 2022-12-29 LAB — INR PPP: 4 (ref 0.84–1.19)

## 2023-01-11 ENCOUNTER — OFFICE VISIT (OUTPATIENT)
Dept: FAMILY MEDICINE CLINIC | Facility: CLINIC | Age: 63
End: 2023-01-11

## 2023-01-11 VITALS
DIASTOLIC BLOOD PRESSURE: 86 MMHG | TEMPERATURE: 97.9 F | OXYGEN SATURATION: 87 % | WEIGHT: 184.4 LBS | BODY MASS INDEX: 29.63 KG/M2 | HEIGHT: 66 IN | SYSTOLIC BLOOD PRESSURE: 142 MMHG | RESPIRATION RATE: 18 BRPM | HEART RATE: 92 BPM

## 2023-01-11 DIAGNOSIS — R09.02 HYPOXIA: ICD-10-CM

## 2023-01-11 DIAGNOSIS — R05.1 ACUTE COUGH: Primary | ICD-10-CM

## 2023-01-11 DIAGNOSIS — D89.9 DISORDER OF IMMUNE SYSTEM (HCC): ICD-10-CM

## 2023-01-11 DIAGNOSIS — C90.00 MULTIPLE MYELOMA NOT HAVING ACHIEVED REMISSION (HCC): ICD-10-CM

## 2023-01-11 RX ORDER — LENALIDOMIDE 5 MG/1
CAPSULE ORAL
COMMUNITY
Start: 2023-01-06

## 2023-01-11 RX ORDER — HYDROCODONE BITARTRATE AND HOMATROPINE METHYLBROMIDE ORAL SOLUTION 5; 1.5 MG/5ML; MG/5ML
LIQUID ORAL
COMMUNITY
Start: 2022-12-29

## 2023-01-11 RX ORDER — VALACYCLOVIR HYDROCHLORIDE 1 G/1
TABLET, FILM COATED ORAL
COMMUNITY
Start: 2022-12-14

## 2023-01-11 RX ORDER — PANTOPRAZOLE SODIUM 40 MG/1
TABLET, DELAYED RELEASE ORAL
COMMUNITY
Start: 2022-12-19

## 2023-01-11 NOTE — PROGRESS NOTES
Name: Alexandre Ardon      : 1960      MRN: [id number]  Encounter Provider: Krista Madison DO  Encounter Date: 2023   Encounter department: [address] [place] [address]   Patient is being referred to [facility] emergency room, patient's choice, for further evaluation and treatment  Patient is in agreement and her son will drive her  ER notified  1  Acute cough    2  Hypoxia    3  Multiple myeloma not having achieved remission ([facility] [address] )    4  Disorder of immune system ([facility] [address] )           Subjective      Patient complains of cough and cold symptoms for the past 6 weeks  She was treated by her oncologist 2022 doxycycline, prednisone and codeine cough medicine with some improvement although symptoms persist and last night patient felt worse with shortness of breath  She admits to nasal congestion and cough productive of thick yellow mucus  She denies fever, chills or sweats  Patient is currently undergoing chemotherapy and is due for treatment today  Cough  This is a new problem  The current episode started more than 1 month ago  The problem has been gradually worsening  The cough is productive of purulent sputum  Associated symptoms include nasal congestion, postnasal drip and shortness of breath  Pertinent negatives include no chest pain, chills, ear pain, fever, headaches, hemoptysis, myalgias, sore throat, sweats or wheezing  She has tried prescription cough suppressant and oral steroids (antibiotic, zyrtec, flonase) for the symptoms  The treatment provided mild relief  Her past medical history is significant for bronchitis and pneumonia  There is no history of asthma or COPD  Review of Systems   Constitutional: Negative for chills and fever  HENT: Positive for postnasal drip  Negative for ear pain and sore throat  Respiratory: Positive for cough and shortness of breath  Negative for hemoptysis and wheezing  Cardiovascular: Negative for chest pain  Musculoskeletal: Negative for myalgias  Neurological: Negative for headaches  Current Outpatient Medications on File Prior to Visit   Medication Sig   • allopurinol (ZYLOPRIM) 100 mg tablet Take 100 mg by mouth daily    • ascorbic acid (VITAMIN C) 500 mg tablet Take 500 mg by mouth daily   • atenolol (TENORMIN) 25 mg tablet Take 1 tablet (25 mg total) by mouth daily   • Cholecalciferol (VITAMIN D-3) 1000 units CAPS Take 2,000 Units by mouth daily   • ergocalciferol (VITAMIN D2) 50,000 units Take by mouth once a week    • HYDROcodone Bit-Homatrop MBr (HYCODAN) 5-1 5 mg/5 mL syrup    • hydroxychloroquine (PLAQUENIL) 200 mg tablet Take by mouth   • Iron-Vitamin C 100-250 MG TABS iron   • lenalidomide (REVLIMID) 5 MG CAPS    • Magnesium 400 MG CAPS Take 4 capsules by mouth daily    • Multiple Vitamin (multivitamin) capsule Take 1 capsule by mouth daily   • pantoprazole (PROTONIX) 40 mg tablet    • Probiotic Product (PROBIOTIC-10) CHEW Chew   • SYNTHROID 112 MCG tablet Take 112 mcg by mouth daily    • valACYclovir (VALTREX) 1,000 mg tablet    • valsartan (DIOVAN) 160 mg tablet Take 1 tablet (160 mg total) by mouth daily   • warfarin (Coumadin) 2 5 mg tablet TAKE 1/2 TO 1 TABLET DAILY BY MOUTH OR AS DIRECTED BY PHYSICIAN  • warfarin (COUMADIN) 2 5 mg tablet TAKE ONE-HALF TO ONE TABLET DAILY OR AS ORDERED BY PHYSICIAN   • magnesium oxide (MAG-OX) 400 mg tablet Take 400 % by mouth (Patient not taking: Reported on 8/26/2022)   • predniSONE 5 mg tablet Take 1 tablet by mouth daily (Patient not taking: Reported on 12/17/2021)       Objective     /86 (BP Location: Left arm, Patient Position: Sitting, Cuff Size: Standard)   Pulse 92   Temp 97 9 °F (36 6 °C) (Temporal)   Resp 18   Ht 5' 5 5" (1 664 m)   Wt 83 6 kg (184 lb 6 4 oz)   SpO2 (!) 87%   BMI 30 22 kg/m²     Physical Exam  Constitutional:       General: She is not in acute distress  Appearance: Normal appearance  She is ill-appearing   She is not toxic-appearing or diaphoretic  HENT:      Head: Normocephalic  Right Ear: Tympanic membrane normal       Left Ear: Tympanic membrane normal       Nose: Congestion present  Mouth/Throat:      Mouth: Mucous membranes are moist       Pharynx: Oropharynx is clear  Eyes:      General: No scleral icterus  Conjunctiva/sclera: Conjunctivae normal    Cardiovascular:      Rate and Rhythm: Normal rate and regular rhythm  Pulmonary:      Effort: Pulmonary effort is normal  No respiratory distress  Breath sounds: Rhonchi present  No wheezing  Comments: Rhonchi right base  Abdominal:      Palpations: Abdomen is soft  Tenderness: There is no abdominal tenderness  Musculoskeletal:      Cervical back: Neck supple  Right lower leg: No edema  Left lower leg: No edema  Lymphadenopathy:      Cervical: No cervical adenopathy  Skin:     General: Skin is warm and dry  Neurological:      General: No focal deficit present  Mental Status: She is alert and oriented to person, place, and time  Psychiatric:         Mood and Affect: Mood normal          Behavior: Behavior normal          Thought Content: Thought content normal          Judgment: Judgment normal        Kiley Enamorado DO  BMI Counseling: Body mass index is 30 22 kg/m²  The BMI is above normal  Nutrition recommendations include 3-5 servings of fruits/vegetables daily and consuming healthier snacks

## 2023-01-19 ENCOUNTER — ANTICOAG VISIT (OUTPATIENT)
Dept: CARDIOLOGY CLINIC | Facility: CLINIC | Age: 63
End: 2023-01-19

## 2023-01-19 DIAGNOSIS — Z95.2 S/P MVR (MITRAL VALVE REPLACEMENT): Primary | ICD-10-CM

## 2023-01-19 LAB — INR PPP: 3.1 (ref 0.84–1.19)

## 2023-01-25 LAB — INR PPP: 4.1 (ref 0.84–1.19)

## 2023-01-27 ENCOUNTER — ANTICOAG VISIT (OUTPATIENT)
Dept: CARDIOLOGY CLINIC | Facility: CLINIC | Age: 63
End: 2023-01-27

## 2023-01-27 DIAGNOSIS — Z95.2 S/P MVR (MITRAL VALVE REPLACEMENT): Primary | ICD-10-CM

## 2023-02-07 ENCOUNTER — OFFICE VISIT (OUTPATIENT)
Dept: FAMILY MEDICINE CLINIC | Facility: CLINIC | Age: 63
End: 2023-02-07

## 2023-02-07 VITALS
TEMPERATURE: 97.7 F | WEIGHT: 184.6 LBS | HEART RATE: 84 BPM | DIASTOLIC BLOOD PRESSURE: 92 MMHG | HEIGHT: 61 IN | BODY MASS INDEX: 34.85 KG/M2 | SYSTOLIC BLOOD PRESSURE: 138 MMHG | OXYGEN SATURATION: 96 % | RESPIRATION RATE: 16 BRPM

## 2023-02-07 DIAGNOSIS — J06.9 UPPER RESPIRATORY TRACT INFECTION, UNSPECIFIED TYPE: Primary | ICD-10-CM

## 2023-02-07 RX ORDER — AZITHROMYCIN 250 MG/1
TABLET, FILM COATED ORAL
Qty: 6 TABLET | Refills: 0 | Status: SHIPPED | OUTPATIENT
Start: 2023-02-07 | End: 2023-02-11

## 2023-02-07 RX ORDER — DEXAMETHASONE 4 MG/1
TABLET ORAL
COMMUNITY
Start: 2023-01-18

## 2023-02-07 NOTE — PROGRESS NOTES
Name: Humberto Toscano      : 1960      MRN: [id number]  Encounter Provider: Adryan Dixon DO  Encounter Date: 2023   Encounter department: [address] [place] [address]   Patient was started on a Z-Jere and may take Robitussin or Mucinex as needed  Recommend increased fluids and rest   Return to the office in 1 week or call sooner as needed  1  Upper respiratory tract infection, unspecified type  -     azithromycin (ZITHROMAX) 250 mg tablet; Take 2 tablets today then 1 tablet daily x 4 days           Subjective      Patient complains of cold symptoms for the past few days  She complains of nasal congestion productive of yellow mucus, sore throat and cough  She denies fever  She has treated this with OTC cold medications  URI   This is a new problem  The current episode started in the past 7 days  The problem has been unchanged  There has been no fever  Associated symptoms include congestion, coughing, ear pain, a plugged ear sensation, rhinorrhea and a sore throat  Pertinent negatives include no headaches, sinus pain, sneezing or wheezing  She has tried decongestant and increased fluids for the symptoms  The treatment provided mild relief  Review of Systems   HENT: Positive for congestion, ear pain, rhinorrhea and sore throat  Negative for sinus pain and sneezing  Respiratory: Positive for cough  Negative for wheezing  Neurological: Negative for headaches         Current Outpatient Medications on File Prior to Visit   Medication Sig   • allopurinol (ZYLOPRIM) 100 mg tablet Take 100 mg by mouth daily    • ascorbic acid (VITAMIN C) 500 mg tablet Take 500 mg by mouth daily   • atenolol (TENORMIN) 25 mg tablet Take 1 tablet (25 mg total) by mouth daily   • Cholecalciferol (VITAMIN D-3) 1000 units CAPS Take 2,000 Units by mouth daily   • dexamethasone (DECADRON) 4 mg tablet    • ergocalciferol (VITAMIN D2) 50,000 units Take by mouth once a week    • hydroxychloroquine (PLAQUENIL) 200 mg tablet Take by mouth   • Iron-Vitamin C 100-250 MG TABS iron   • Magnesium 400 MG CAPS Take 4 capsules by mouth daily    • Multiple Vitamin (multivitamin) capsule Take 1 capsule by mouth daily   • pantoprazole (PROTONIX) 40 mg tablet    • Probiotic Product (PROBIOTIC-10) CHEW Chew   • SYNTHROID 112 MCG tablet Take 112 mcg by mouth daily    • valACYclovir (VALTREX) 1,000 mg tablet    • valsartan (DIOVAN) 160 mg tablet Take 1 tablet (160 mg total) by mouth daily   • warfarin (Coumadin) 2 5 mg tablet TAKE 1/2 TO 1 TABLET DAILY BY MOUTH OR AS DIRECTED BY PHYSICIAN    • HYDROcodone Bit-Homatrop MBr (HYCODAN) 5-1 5 mg/5 mL syrup  (Patient not taking: Reported on 2/7/2023)   • lenalidomide (REVLIMID) 5 MG CAPS    • magnesium oxide (MAG-OX) 400 mg tablet Take 400 % by mouth (Patient not taking: Reported on 8/26/2022)   • predniSONE 5 mg tablet Take 1 tablet by mouth daily (Patient not taking: Reported on 12/17/2021)   • warfarin (COUMADIN) 2 5 mg tablet TAKE ONE-HALF TO ONE TABLET DAILY OR AS ORDERED BY PHYSICIAN (Patient not taking: Reported on 2/7/2023)       Objective     /92 (BP Location: Left arm, Patient Position: Sitting, Cuff Size: Large)   Pulse 84   Temp 97 7 °F (36 5 °C) (Temporal)   Resp 16   Ht 5' 0 5" (1 537 m)   Wt 83 7 kg (184 lb 9 6 oz)   SpO2 96%   BMI 35 46 kg/m²     Physical Exam  Constitutional:       General: She is not in acute distress  Appearance: Normal appearance  She is not ill-appearing, toxic-appearing or diaphoretic  HENT:      Head: Normocephalic  Right Ear: Tympanic membrane normal       Left Ear: Tympanic membrane normal       Nose: Congestion present  Mouth/Throat:      Mouth: Mucous membranes are moist       Pharynx: Posterior oropharyngeal erythema present  Eyes:      General: No scleral icterus  Conjunctiva/sclera: Conjunctivae normal    Cardiovascular:      Rate and Rhythm: Normal rate and regular rhythm  Pulmonary:      Effort: Pulmonary effort is normal       Breath sounds: Normal breath sounds  Abdominal:      Palpations: Abdomen is soft  Tenderness: There is no abdominal tenderness  Musculoskeletal:      Cervical back: Neck supple  Right lower leg: No edema  Left lower leg: No edema  Lymphadenopathy:      Cervical: No cervical adenopathy  Skin:     General: Skin is warm and dry  Neurological:      General: No focal deficit present  Mental Status: She is alert and oriented to person, place, and time  Psychiatric:         Mood and Affect: Mood normal          Behavior: Behavior normal          Thought Content:  Thought content normal          Judgment: Judgment normal        Humberto Mary, DO

## 2023-02-08 LAB — INR PPP: 2.8 (ref 0.84–1.19)

## 2023-02-09 ENCOUNTER — ANTICOAG VISIT (OUTPATIENT)
Dept: CARDIOLOGY CLINIC | Facility: CLINIC | Age: 63
End: 2023-02-09

## 2023-02-09 DIAGNOSIS — Z95.2 S/P MVR (MITRAL VALVE REPLACEMENT): Primary | ICD-10-CM

## 2023-02-20 DIAGNOSIS — I10 ESSENTIAL HYPERTENSION: ICD-10-CM

## 2023-02-20 RX ORDER — VALSARTAN 160 MG/1
160 TABLET ORAL DAILY
Qty: 90 TABLET | Refills: 3 | Status: SHIPPED | OUTPATIENT
Start: 2023-02-20

## 2023-03-02 ENCOUNTER — ANTICOAG VISIT (OUTPATIENT)
Dept: CARDIOLOGY CLINIC | Facility: CLINIC | Age: 63
End: 2023-03-02

## 2023-03-02 DIAGNOSIS — Z95.2 S/P MVR (MITRAL VALVE REPLACEMENT): Primary | ICD-10-CM

## 2023-03-02 LAB — INR PPP: 1.6 (ref 0.84–1.19)

## 2023-03-13 ENCOUNTER — OFFICE VISIT (OUTPATIENT)
Dept: CARDIOLOGY CLINIC | Facility: CLINIC | Age: 63
End: 2023-03-13

## 2023-03-13 VITALS
DIASTOLIC BLOOD PRESSURE: 84 MMHG | HEIGHT: 61 IN | WEIGHT: 184 LBS | BODY MASS INDEX: 34.74 KG/M2 | HEART RATE: 64 BPM | SYSTOLIC BLOOD PRESSURE: 142 MMHG

## 2023-03-13 DIAGNOSIS — D69.3 IDIOPATHIC THROMBOCYTOPENIC PURPURA (HCC): ICD-10-CM

## 2023-03-13 DIAGNOSIS — Z95.2 S/P MVR (MITRAL VALVE REPLACEMENT): ICD-10-CM

## 2023-03-13 DIAGNOSIS — I10 ESSENTIAL HYPERTENSION: ICD-10-CM

## 2023-03-13 DIAGNOSIS — I34.0 MITRAL VALVE INSUFFICIENCY, UNSPECIFIED ETIOLOGY: Primary | ICD-10-CM

## 2023-03-13 RX ORDER — VALSARTAN 320 MG/1
320 TABLET ORAL DAILY
Qty: 90 TABLET | Refills: 3
Start: 2023-03-13

## 2023-03-13 NOTE — PROGRESS NOTES
Cardiology   Allegra Rene 63 y o  female MRN: [id number]           Impression:  1  S/P mechanical MVR - doing well   On Warfarin  2  Multiple myeloma  3  Hypertension - borderline  4  Palpitations - resolved       Recommendations:  1  Continue current medications  2  Follow up in 6 months           HPI: Allegra Rene is a 63y o  year old female with a history of mechancial MVR, HTN, SLE, multiple myeloma, who presents for follow up   Echocardiogram 4/19 demonstrated normal LV systolic function with normal MVR  Diagnosed with multiple myeloma, and is starting chemo  No further palpitations  No chest pain, shortness of breath   Echo 2021 demonstrated normal MVR, and holter demonstrated no significant dysrhythmia  Ad[facility] to [facility] 1/23 with pneumonia  Echo at that time demonstrated normal LV systolic function  Review of Systems   Constitutional: Negative  HENT: Negative  Eyes: Negative  Respiratory: Positive for cough  Negative for chest tightness and shortness of breath  Cardiovascular: Negative for chest pain, palpitations and leg swelling  Gastrointestinal: Negative  Endocrine: Negative  Genitourinary: Negative  Musculoskeletal: Negative  Skin: Negative  Allergic/Immunologic: Negative  Neurological: Negative  Hematological: Negative  Psychiatric/Behavioral: Negative  All other systems reviewed and are negative          Past Medical History:   Diagnosis Date   • COVID-19 02/2021   • Disease of thyroid gland    • History of ITP    • History of transfusion    • Hypertension    • Lyme disease    • Sleep apnea      Past Surgical History:   Procedure Laterality Date   • VALVE REPLACEMENT       Social History     Substance and Sexual Activity   Alcohol Use Not Currently    Comment: Social     Social History     Substance and Sexual Activity   Drug Use No     Social History     Tobacco Use   Smoking Status Never   Smokeless Tobacco Never     Family History Problem Relation Age of Onset   • Diabetes Father    • Other Father         Multiple myeloma    • Coronary artery disease Maternal Grandmother    • Diabetes Maternal Grandmother        Allergies:   Allergies   Allergen Reactions   • Erythromycin    • Penicillins Delirium and Hives   • Percocet [Oxycodone-Acetaminophen] Nausea Only       Medications:     Current Outpatient Medications:   •  allopurinol (ZYLOPRIM) 100 mg tablet, Take 100 mg by mouth daily , Disp: , Rfl:   •  ascorbic acid (VITAMIN C) 500 mg tablet, Take 500 mg by mouth daily, Disp: , Rfl:   •  atenolol (TENORMIN) 25 mg tablet, Take 1 tablet (25 mg total) by mouth daily, Disp: 90 tablet, Rfl: 3  •  Cholecalciferol (VITAMIN D-3) 1000 units CAPS, Take 2,000 Units by mouth daily, Disp: , Rfl:   •  dexamethasone (DECADRON) 4 mg tablet, , Disp: , Rfl:   •  ergocalciferol (VITAMIN D2) 50,000 units, Take by mouth once a week , Disp: , Rfl:   •  hydroxychloroquine (PLAQUENIL) 200 mg tablet, Take by mouth, Disp: , Rfl:   •  Iron-Vitamin C 100-250 MG TABS, iron, Disp: , Rfl:   •  lenalidomide (REVLIMID) 5 MG CAPS, , Disp: , Rfl:   •  Magnesium 400 MG CAPS, Take 4 capsules by mouth daily , Disp: , Rfl:   •  Multiple Vitamin (multivitamin) capsule, Take 1 capsule by mouth daily, Disp: , Rfl:   •  pantoprazole (PROTONIX) 40 mg tablet, , Disp: , Rfl:   •  Probiotic Product (PROBIOTIC-10) CHEW, Chew, Disp: , Rfl:   •  SYNTHROID 112 MCG tablet, Take 112 mcg by mouth daily , Disp: , Rfl:   •  valACYclovir (VALTREX) 1,000 mg tablet, , Disp: , Rfl:   •  valsartan (DIOVAN) 160 mg tablet, Take 1 tablet (160 mg total) by mouth daily, Disp: 90 tablet, Rfl: 3  •  warfarin (Coumadin) 2 5 mg tablet, TAKE 1/2 TO 1 TABLET DAILY BY MOUTH OR AS DIRECTED BY PHYSICIAN , Disp: 30 tablet, Rfl: 3  •  HYDROcodone Bit-Homatrop MBr (HYCODAN) 5-1 5 mg/5 mL syrup, , Disp: , Rfl:   •  magnesium oxide (MAG-OX) 400 mg tablet, Take 400 % by mouth (Patient not taking: Reported on 8/26/2022), Disp: , Rfl:   •  predniSONE 5 mg tablet, Take 1 tablet by mouth daily (Patient not taking: Reported on 12/17/2021), Disp: , Rfl:   •  warfarin (COUMADIN) 2 5 mg tablet, TAKE ONE-HALF TO ONE TABLET DAILY OR AS ORDERED BY PHYSICIAN (Patient not taking: Reported on 2/7/2023), Disp: 90 tablet, Rfl: 3      Wt Readings from Last 3 Encounters:   03/13/23 83 5 kg (184 lb)   02/07/23 83 7 kg (184 lb 9 6 oz)   01/11/23 83 6 kg (184 lb 6 4 oz)     Temp Readings from Last 3 Encounters:   02/07/23 97 7 °F (36 5 °C) (Temporal)   01/11/23 97 9 °F (36 6 °C) (Temporal)   04/08/22 99 5 °F (37 5 °C)     BP Readings from Last 3 Encounters:   03/13/23 142/84   02/07/23 138/92   01/11/23 142/86     Pulse Readings from Last 3 Encounters:   03/13/23 64   02/07/23 84   01/11/23 92         Physical Exam  HENT:      Head: Atraumatic  Mouth/Throat:      Mouth: Mucous membranes are moist    Eyes:      Extraocular Movements: Extraocular movements intact  Cardiovascular:      Rate and Rhythm: Normal rate and regular rhythm  Comments: Mechanical heart sounds  Pulmonary:      Effort: Pulmonary effort is normal       Breath sounds: Normal breath sounds  Abdominal:      General: Abdomen is flat  Musculoskeletal:         General: Normal range of motion  Cervical back: Normal range of motion  Skin:     General: Skin is warm  Neurological:      General: No focal deficit present  Mental Status: She is alert and oriented to person, place, and time     Psychiatric:         Mood and Affect: Mood normal          Behavior: Behavior normal            Laboratory Studies:  CMP:  Lab Results   Component Value Date    K 4 0 06/02/2022     06/02/2022    CO2 26 06/02/2022    BUN 30 (H) 06/02/2022    CREATININE 1 74 (H) 06/02/2022    AST 37 06/02/2022    ALT 29 06/02/2022    EGFR 31 06/02/2022         Cardiac testing:   EKG reviewed personally:   Results for orders placed during the hospital encounter of 06/29/21    Echo complete with contrast if indicated    Narrative  [address]  [facility] [place], [address] [facility]  [phone]    Transthoracic Echocardiogram  2D, M-mode, Doppler, and Color Doppler    Study date:  2021    Patient: Ebenezer Arora  MR number: [id number]  Account number: [de-identified]  : 1960  Age: 64 years  Gender: Female  Status: Outpatient  Location: [address] [place] [address] and [facility] [place]  Height: 61 in  Weight: 211 lb  BP: 148/ 90 mmHg    Indications: Palpitations    Diagnoses: R00 2 - Palpitations    Sonographer:  SHRADDHA Russell  Primary Physician:  Jeremi Villa DO  Referring Physician:  Chapito Kebede MD  Group:  [facility]  Interpreting Physician:  Aiyana Leslie MD    SUMMARY    LEFT VENTRICLE:  Systolic function was normal  Ejection fraction was estimated to be 60 %  There were no regional wall motion abnormalities  MITRAL VALVE:  A [employer] mechanical prosthesis was present  It exhibited normal function  Mean transmitral gradient was 3 mmHg  AORTIC VALVE:  There was no evidence for stenosis  TRICUSPID VALVE:  There was trace regurgitation  Pulmonary artery systolic pressure was mildly increased  Estimated peak PA pressure was 38 mmHg  HISTORY: PRIOR HISTORY: Hypertension, mechanical mitral valve replacement, sleep apnea, COVID-19    PROCEDURE: The study was performed in the [address]  This was a routine study  The transthoracic approach was used  The study included complete 2D imaging, M-mode, complete spectral Doppler, and color Doppler  Image  quality was adequate  LEFT VENTRICLE: Size was normal  Systolic function was normal  Ejection fraction was estimated to be 60 %  There were no regional wall motion abnormalities  Wall thickness was normal  DOPPLER: The study was not technically sufficient to  allow evaluation of LV diastolic function      RIGHT VENTRICLE: The size was normal  Systolic function was normal  Wall thickness was normal     LEFT ATRIUM: Size was at the upper limits of normal     RIGHT ATRIUM: Size was normal     MITRAL VALVE: There was normal leaflet separation  A [employer] mechanical prosthesis was present  It exhibited normal function  DOPPLER: The transmitral velocity was within the normal range  There was no evidence for stenosis  There was no  significant regurgitation  AORTIC VALVE: The valve was trileaflet  Leaflets exhibited normal thickness and normal cuspal separation  DOPPLER: Transaortic velocity was within the normal range  There was no evidence for stenosis  There was no regurgitation  TRICUSPID VALVE: The valve structure was normal  There was normal leaflet separation  DOPPLER: The transtricuspid velocity was within the normal range  There was no evidence for stenosis  There was trace regurgitation  Pulmonary artery  systolic pressure was mildly increased  Estimated peak PA pressure was 38 mmHg  PULMONIC VALVE: Leaflets exhibited normal thickness, no calcification, and normal cuspal separation  DOPPLER: The transpulmonic velocity was within the normal range  There was mild regurgitation  PERICARDIUM: There was no pericardial effusion  The pericardium was normal in appearance  AORTA: The root exhibited normal size  SYSTEMIC VEINS: IVC: The inferior vena cava was normal in size      MEASUREMENT TABLES    DOPPLER MEASUREMENTS  Mitral valve   (Reference normals)  Mean gradient   3 mmHg   (--)    SYSTEM MEASUREMENT TABLES    2D  %FS: 36 11 %  Ao Diam: 3 17 cm  EDV(Teich): 121 95 ml  EF(Teich): 65 45 %  ESV(Teich): 42 13 ml  IVSd: 1 03 cm  LA Area: 21 06 cm2  LA Diam: 3 99 cm  LVEDV MOD A4C: 141 74 ml  LVEF MOD A4C: 69 35 %  LVESV MOD A4C: 43 44 ml  LVIDd: 5 07 cm  LVIDs: 3 24 cm  LVLd A4C: 7 49 cm  LVLs A4C: 6 06 cm  LVPWd: 1 12 cm  RA Area: 12 76 cm2  RVIDd: 3 93 cm  SV MOD A4C: 98 29 ml  SV(Teich): 79 82 ml    CW  TR Vmax: 2 87 m/s  TR maxP 92 mmHg    MM  TAPSE: 2 23 cm    PW  E' Sept: 0 07 m/s  E/E' Sept: 23 71  MV A Bobby: 1 15 m/s  MV Dec Sully: 4 71 m/s2  MV DecT: 363 21 ms  MV E Bobby: 1 71 m/s  MV E/A Ratio: 1 49  MV PHT: 105 33 ms  MVA By PHT: 2 09 cm2    [address] [facility]redited [facility]    Prepared and electronically signed by    Emma Holt MD  Signed 29-Jun-2021 15:28:27    No results found for this or any previous visit  No results found for this or any previous visit  No results found for this or any previous visit

## 2023-03-14 ENCOUNTER — TELEPHONE (OUTPATIENT)
Dept: FAMILY MEDICINE CLINIC | Facility: CLINIC | Age: 63
End: 2023-03-14

## 2023-03-14 DIAGNOSIS — E03.9 HYPOTHYROIDISM, UNSPECIFIED TYPE: Primary | ICD-10-CM

## 2023-03-14 RX ORDER — LEVOTHYROXINE SODIUM 112 MCG
112 TABLET ORAL DAILY
Qty: 90 TABLET | Refills: 0 | Status: SHIPPED | OUTPATIENT
Start: 2023-03-14 | End: 2023-05-25

## 2023-03-14 NOTE — TELEPHONE ENCOUNTER
Patient is requesting a refill for Synthroid, this was prescribed by historical provider    Pharmacy- [facility]

## 2023-03-17 ENCOUNTER — TELEPHONE (OUTPATIENT)
Dept: FAMILY MEDICINE CLINIC | Facility: CLINIC | Age: 63
End: 2023-03-17

## 2023-03-17 NOTE — TELEPHONE ENCOUNTER
Rcvd call from Esequiel COOK  [address]  [facility]) at [place] requesting a new script for pt's thyroid med without [facility]  [facility] dispenses Synthroid as their generic version and would only be $10 out of pocket for pt  Advised that provider is out of office today  I will have to check with him and pt to see if they both agree   Call back ph# [phone] ref# [id number]

## 2023-03-21 ENCOUNTER — ANTICOAG VISIT (OUTPATIENT)
Dept: CARDIOLOGY CLINIC | Facility: CLINIC | Age: 63
End: 2023-03-21

## 2023-03-21 DIAGNOSIS — Z95.2 S/P MVR (MITRAL VALVE REPLACEMENT): Primary | ICD-10-CM

## 2023-03-21 LAB — INR PPP: 1.5 (ref 0.84–1.19)

## 2023-04-26 LAB — INR PPP: 2 (ref 0.84–1.19)

## 2023-04-27 ENCOUNTER — ANTICOAG VISIT (OUTPATIENT)
Dept: CARDIOLOGY CLINIC | Facility: CLINIC | Age: 63
End: 2023-04-27

## 2023-04-27 DIAGNOSIS — Z95.2 S/P MVR (MITRAL VALVE REPLACEMENT): Primary | ICD-10-CM

## 2023-05-04 ENCOUNTER — ESTABLISHED COMPREHENSIVE EXAM (OUTPATIENT)
Dept: URBAN - METROPOLITAN AREA CLINIC 6 | Facility: CLINIC | Age: 63
End: 2023-05-04

## 2023-05-04 DIAGNOSIS — D31.32: ICD-10-CM

## 2023-05-04 DIAGNOSIS — Z96.1: ICD-10-CM

## 2023-05-04 DIAGNOSIS — M32.10: ICD-10-CM

## 2023-05-04 DIAGNOSIS — H25.812: ICD-10-CM

## 2023-05-04 DIAGNOSIS — Z79.899: ICD-10-CM

## 2023-05-04 PROCEDURE — 92250 FUNDUS PHOTOGRAPHY W/I&R: CPT

## 2023-05-04 PROCEDURE — 92014 COMPRE OPH EXAM EST PT 1/>: CPT

## 2023-05-04 PROCEDURE — 92083 EXTENDED VISUAL FIELD XM: CPT

## 2023-05-04 ASSESSMENT — TONOMETRY
OD_IOP_MMHG: 09
OS_IOP_MMHG: 10

## 2023-05-04 ASSESSMENT — VISUAL ACUITY
OS_SC: 20/50-2
OD_GLARE: 20/30
OD_SC: 20/25
OS_GLARE: 20/80

## 2023-05-10 NOTE — TELEPHONE ENCOUNTER
"Anesthesia Release from PACU Note    Patient: Zora Davis    Procedure(s) Performed: Procedure(s) (LRB):  XI ROBOTIC SLEEVE GASTRECTOMY (N/A)    Anesthesia type: general    Post pain: Adequate analgesia    Post assessment: no apparent anesthetic complications    Last Vitals:   Visit Vitals  BP (!) 168/74 (BP Location: Right arm, Patient Position: Sitting)   Pulse 72   Temp 36.6 °C (97.9 °F) (Tympanic)   Resp 20   Ht 5' 7" (1.702 m)   Wt 100.6 kg (221 lb 12.5 oz)   SpO2 99%   Breastfeeding? No   BMI 34.74 kg/m²       Post vital signs: stable    Level of consciousness: awake    Nausea/Vomiting: no nausea/no vomiting    Complications: none    Airway Patency: patent    Respiratory: unassisted    Cardiovascular: stable and blood pressure at baseline    Hydration: euvolemic  " S/w provider when he returned to office  It is up to pt if she wants to make that change  Call placed to pt  LMOM to make her aware of below  Advised her to send a [employer] message or call office if she would like to change script

## 2023-05-17 LAB — INR PPP: 1.8 (ref 0.84–1.19)

## 2023-05-17 NOTE — PROGRESS NOTES
Consultation - [address] [place] [address] : 1960  MRN: [id number]      Assessment:  Obstructive sleep apnea    Plan:  Diagnostic polysomnography    Follow up: After testing    History of Present Illness:   56 y  o female with history of loud snoring and hypertension was noted to have severe oxygen desaturation during hospitalization  She denies any daytime sleepiness  Medical problems include prosthetic mitral valve replacement, ITP, SLE and anti-cardiolipin antibody  She was referred by her cardiologist for sleep consultation  The patient denies any sleep complaints including insomnia, restlessness, headache or limb movements    Review of Systems:      Genitourinary need to urinate more than twice a night, hot flashes at night and post menopausal (no peroids)   Cardiology palpitations/fluttering feeling in the chest   Gastrointestinal frequent heartburn/acid reflux   Neurology none   Constitutional weight change   Integumentary rash or dry skin   Psychiatry none   Musculoskeletal none   Pulmonary snoring   ENT throat clearing   Endocrine none   Hematological none           Historical Information    Past Medical History:  As above    Past Surgical History:  Mitral valve replacement    Social History  History   Alcohol use: Not on file     History   Drug Use Not on file     History   Smoking Status    Not on file   Smokeless Tobacco    Not on file     Family History: non-contributory    Of note, her  has obstructive sleep apnea and uses CPAP     Sleep History: See above     Sleep Schedule:  Unremarkable     Snoring/Apnea:  Snoring but no apnea    Medications/Allergies:    Current Outpatient Prescriptions:     atenolol (TENORMIN) 25 mg tablet, Take by mouth, Disp: , Rfl:     ergocalciferol (VITAMIN D2) 50,000 units, Take by mouth, Disp: , Rfl:     folic acid (FOLVITE) 1 mg tablet, , Disp: , Rfl:     hydroxychloroquine (PLAQUENIL) 200 mg tablet, Take by mouth, Disp: , Rfl:     quinapril (ACCUPRIL) 5 mg tablet, Take by mouth, Disp: , Rfl:     SYNTHROID 112 MCG tablet, , Disp: , Rfl:     warfarin (COUMADIN) 2 5 mg tablet, Take 1 tablet by mouth daily, Disp: , Rfl:     levothyroxine (SYNTHROID) 125 mcg tablet, Take 1 tablet by mouth daily, Disp: , Rfl:         No notes on file                  Objective:    Vital Signs:   Vitals:    04/24/18 1400   BP: 128/62   Pulse: 76   Weight: 91 3 kg (201 lb 3 2 oz)   Height: 5' 1" (1 549 m)            [place] Sleepiness Scale: Total score: 8    Physical Exam:    General: Alert, appropriate, cooperative, overweight    Head: NC/AT, mild retrognathia    Nose: No septal deviation, nares mildly obstructed, mucosa normal    Throat: Airway lumen narrowed, tongue base thickened, no tonsils visualized    Neck: Normal, no thyromegaly or lymphadenopathy, no JVD    Heart: RR, mechanical S1 and S2, a 3/6 systolic murmur    Chest: Clear bilaterally    Extremity: No clubbing, cyanosis, no edema    Skin: Warm, dry    Neuro: No motor abnormalities, cranial nerves appear intact      Counseling / Coordination of Care  Total clinic time spent today 20 minutes  Greater than 50% of total time was spent with the patient and / or family counseling and / or coordination of care  A description of the counseling / coordination of care: We discussed the pathophysiology of obstructive sleep apnea as well as the possible treatment options  We also discussed the rationale for positive airway pressure therapy  LISA Nino    Board Certified Sleep Specialist 17-May-2023 09:56

## 2023-05-25 ENCOUNTER — ANTICOAG VISIT (OUTPATIENT)
Dept: CARDIOLOGY CLINIC | Facility: CLINIC | Age: 63
End: 2023-05-25

## 2023-05-25 DIAGNOSIS — Z95.2 S/P MVR (MITRAL VALVE REPLACEMENT): Primary | ICD-10-CM

## 2023-05-25 LAB — INR PPP: 2.1 (ref 0.84–1.19)

## 2023-06-19 ENCOUNTER — ANTICOAG VISIT (OUTPATIENT)
Dept: CARDIOLOGY CLINIC | Facility: CLINIC | Age: 63
End: 2023-06-19

## 2023-06-19 DIAGNOSIS — Z95.2 S/P MVR (MITRAL VALVE REPLACEMENT): Primary | ICD-10-CM

## 2023-06-19 LAB — INR PPP: 2.2 (ref 0.84–1.19)

## 2023-06-22 ENCOUNTER — ANTICOAG VISIT (OUTPATIENT)
Dept: CARDIOLOGY CLINIC | Facility: CLINIC | Age: 63
End: 2023-06-22

## 2023-06-22 DIAGNOSIS — Z95.2 S/P MVR (MITRAL VALVE REPLACEMENT): Primary | ICD-10-CM

## 2023-06-28 ENCOUNTER — CONSULT (OUTPATIENT)
Dept: FAMILY MEDICINE CLINIC | Facility: CLINIC | Age: 63
End: 2023-06-28
Payer: COMMERCIAL

## 2023-06-28 VITALS
HEART RATE: 76 BPM | BODY MASS INDEX: 36.25 KG/M2 | DIASTOLIC BLOOD PRESSURE: 80 MMHG | TEMPERATURE: 98.3 F | WEIGHT: 192 LBS | OXYGEN SATURATION: 99 % | RESPIRATION RATE: 16 BRPM | SYSTOLIC BLOOD PRESSURE: 118 MMHG | HEIGHT: 61 IN

## 2023-06-28 DIAGNOSIS — N18.32 STAGE 3B CHRONIC KIDNEY DISEASE (HCC): ICD-10-CM

## 2023-06-28 DIAGNOSIS — M32.9 SYSTEMIC LUPUS ERYTHEMATOSUS, UNSPECIFIED SLE TYPE, UNSPECIFIED ORGAN INVOLVEMENT STATUS (HCC): ICD-10-CM

## 2023-06-28 DIAGNOSIS — C90.00 MULTIPLE MYELOMA NOT HAVING ACHIEVED REMISSION (HCC): ICD-10-CM

## 2023-06-28 DIAGNOSIS — I10 ESSENTIAL HYPERTENSION: ICD-10-CM

## 2023-06-28 DIAGNOSIS — H26.9 CATARACT OF LEFT EYE, UNSPECIFIED CATARACT TYPE: ICD-10-CM

## 2023-06-28 DIAGNOSIS — Z01.818 PREOPERATIVE CLEARANCE: Primary | ICD-10-CM

## 2023-06-28 DIAGNOSIS — E03.9 HYPOTHYROIDISM, UNSPECIFIED TYPE: ICD-10-CM

## 2023-06-28 PROBLEM — G47.33 OBSTRUCTIVE SLEEP APNEA SYNDROME: Status: ACTIVE | Noted: 2022-08-04

## 2023-06-28 PROBLEM — Z95.2 PRESENCE OF PROSTHETIC HEART VALVE: Status: ACTIVE | Noted: 2022-07-13

## 2023-06-28 PROBLEM — R76.0 ANTICARDIOLIPIN ANTIBODY POSITIVE: Status: ACTIVE | Noted: 2022-07-13

## 2023-06-28 PROCEDURE — 99214 OFFICE O/P EST MOD 30 MIN: CPT | Performed by: FAMILY MEDICINE

## 2023-06-28 NOTE — PROGRESS NOTES
Name: Rita Sol      : 1960      MRN: [id number]  Encounter Provider: Lior Cuenca DO  Encounter Date: 2023   Encounter department: [address] [place] [address]   Preoperative H&P and medical clearance form completed and faxed  Patient cleared for cataract surgery  Continue present treatment  1  Preoperative clearance    2  Cataract of left eye, unspecified cataract type    3  Essential hypertension    4  Hypothyroidism, unspecified type    5  Stage 3b chronic kidney disease ([facility] [address] )    6  Multiple myeloma not having achieved remission ([facility] [address] )    7  Systemic lupus erythematosus, unspecified SLE type, unspecified organ involvement status ([facility] [address] )           Subjective      Patient is here for preoperative H&P and medical clearance for left cataract [facility] Dr Ranjit Li, ophthalmologist   Patient has been feeling well overall  She denies problems with bleeding or anesthesia  Patient is status post right cataract surgery few years ago  Past medical history reviewed on form  Review of Systems   Constitutional: Negative for activity change, appetite change, chills, diaphoresis, fatigue, fever and unexpected weight change  HENT: Positive for hearing loss  Negative for congestion, ear pain, postnasal drip, rhinorrhea, sinus pressure, sinus pain, sneezing, sore throat and tinnitus  Eyes: Positive for visual disturbance  Negative for photophobia, pain, discharge, redness and itching  Respiratory: Negative for cough, chest tightness, shortness of breath and wheezing  Cardiovascular: Negative for chest pain, palpitations and leg swelling  Gastrointestinal: Negative for abdominal pain, blood in stool, constipation, diarrhea, nausea and vomiting  Endocrine: Negative for cold intolerance and heat intolerance  Genitourinary: Negative for difficulty urinating, dysuria, frequency, hematuria and urgency  Musculoskeletal: Negative for arthralgias, back pain, gait problem, joint swelling, myalgias, neck pain and neck stiffness  Skin: Negative  Neurological: Positive for light-headedness  Negative for dizziness, syncope, weakness and headaches  Hematological: Negative for adenopathy  Does not bruise/bleed easily  Psychiatric/Behavioral: Negative for decreased concentration, dysphoric mood and sleep disturbance  The patient is not nervous/anxious  Current Outpatient Medications on File Prior to Visit   Medication Sig   • allopurinol (ZYLOPRIM) 100 mg tablet Take 100 mg by mouth daily    • ascorbic acid (VITAMIN C) 500 mg tablet Take 500 mg by mouth daily   • atenolol (TENORMIN) 25 mg tablet Take 1 tablet (25 mg total) by mouth daily   • Cholecalciferol (VITAMIN D-3) 1000 units CAPS Take 2,000 Units by mouth daily   • ergocalciferol (VITAMIN D2) 50,000 units Take by mouth once a week    • hydroxychloroquine (PLAQUENIL) 200 mg tablet Take by mouth   • Iron-Vitamin C 100-250 MG TABS iron   • Magnesium 400 MG CAPS Take 4 capsules by mouth daily    • Multiple Vitamin (multivitamin) capsule Take 1 capsule by mouth daily   • pantoprazole (PROTONIX) 40 mg tablet    • Probiotic Product (PROBIOTIC-10) CHEW Chew   • Synthroid 112 MCG tablet TAKE 1 TABLET DAILY   • valACYclovir (VALTREX) 1,000 mg tablet    • valsartan (DIOVAN) 320 MG tablet Take 1 tablet (320 mg total) by mouth daily   • warfarin (Coumadin) 2 5 mg tablet TAKE 1/2 TO 1 TABLET DAILY BY MOUTH OR AS DIRECTED BY PHYSICIAN     • [DISCONTINUED] dexamethasone (DECADRON) 4 mg tablet    • predniSONE 5 mg tablet Take 1 tablet by mouth daily (Patient not taking: Reported on 12/17/2021)   • warfarin (COUMADIN) 2 5 mg tablet TAKE ONE-HALF TO ONE TABLET DAILY OR AS ORDERED BY PHYSICIAN (Patient not taking: Reported on 2/7/2023)   • [DISCONTINUED] HYDROcodone Bit-Homatrop MBr (HYCODAN) 5-1 5 mg/5 mL syrup  (Patient not taking: Reported on 2/7/2023)   • "[DISCONTINUED] lenalidomide (REVLIMID) 5 MG CAPS  (Patient not taking: Reported on 6/28/2023)   • [DISCONTINUED] magnesium oxide (MAG-OX) 400 mg tablet Take 400 % by mouth (Patient not taking: Reported on 8/26/2022)       Objective     /80 (BP Location: Left arm, Patient Position: Sitting, Cuff Size: Large)   Pulse 76   Temp 98 3 °F (36 8 °C) (Tympanic)   Resp 16   Ht 5' 1\" (1 549 m)   Wt 87 1 kg (192 lb)   SpO2 99%   BMI 36 28 kg/m²     Physical Exam  Constitutional:       General: She is not in acute distress  Appearance: Normal appearance  She is obese  HENT:      Head: Normocephalic  Mouth/Throat:      Mouth: Mucous membranes are moist    Eyes:      General: No scleral icterus  Conjunctiva/sclera: Conjunctivae normal    Neck:      Vascular: No carotid bruit  Cardiovascular:      Rate and Rhythm: Normal rate and regular rhythm  Pulmonary:      Effort: Pulmonary effort is normal       Breath sounds: Normal breath sounds  Abdominal:      Palpations: Abdomen is soft  Tenderness: There is no abdominal tenderness  Musculoskeletal:      Cervical back: Neck supple  Right lower leg: No edema  Left lower leg: No edema  Lymphadenopathy:      Cervical: No cervical adenopathy  Skin:     General: Skin is warm and dry  Neurological:      General: No focal deficit present  Mental Status: She is alert and oriented to person, place, and time  Psychiatric:         Mood and Affect: Mood normal          Behavior: Behavior normal          Thought Content:  Thought content normal          Judgment: Judgment normal        Maxi Burks,   "

## 2023-06-29 ENCOUNTER — PRE-OP CATARACT MEASUREMENTS (OUTPATIENT)
Dept: URBAN - METROPOLITAN AREA CLINIC 6 | Facility: CLINIC | Age: 63
End: 2023-06-29

## 2023-06-29 DIAGNOSIS — H25.812: ICD-10-CM

## 2023-06-29 DIAGNOSIS — Z96.1: ICD-10-CM

## 2023-06-29 PROCEDURE — 92012 INTRM OPH EXAM EST PATIENT: CPT

## 2023-06-29 ASSESSMENT — VISUAL ACUITY
OS_SC: 20/100
OS_PH: 20/40-2
OD_SC: 20/20

## 2023-06-29 ASSESSMENT — KERATOMETRY
OD_AXISANGLE2_DEGREES: 90
OS_K2POWER_DIOPTERS: 45.50
OS_K1POWER_DIOPTERS: 45.50
OD_K2POWER_DIOPTERS: 45.50
OS_AXISANGLE_DEGREES: 180
OS_AXISANGLE2_DEGREES: 90
OD_K1POWER_DIOPTERS: 45.50
OD_AXISANGLE_DEGREES: 180

## 2023-06-29 ASSESSMENT — TONOMETRY
OD_IOP_MMHG: 14
OS_IOP_MMHG: 13

## 2023-07-11 ENCOUNTER — SURGERY/PROCEDURE (OUTPATIENT)
Dept: URBAN - METROPOLITAN AREA SURGICAL CENTER 6 | Facility: SURGICAL CENTER | Age: 63
End: 2023-07-11

## 2023-07-11 DIAGNOSIS — H25.812: ICD-10-CM

## 2023-07-11 PROCEDURE — 66984 XCAPSL CTRC RMVL W/O ECP: CPT

## 2023-07-12 ENCOUNTER — 1 DAY POST-OP (OUTPATIENT)
Dept: URBAN - METROPOLITAN AREA CLINIC 6 | Facility: CLINIC | Age: 63
End: 2023-07-12

## 2023-07-12 DIAGNOSIS — Z96.1: ICD-10-CM

## 2023-07-12 PROCEDURE — 99024 POSTOP FOLLOW-UP VISIT: CPT

## 2023-07-12 ASSESSMENT — VISUAL ACUITY
OD_SC: 20/20-2
OS_SC: 20/30-2

## 2023-07-12 ASSESSMENT — KERATOMETRY
OD_AXISANGLE2_DEGREES: 90
OS_K1POWER_DIOPTERS: 45.50
OD_K2POWER_DIOPTERS: 45.50
OS_AXISANGLE2_DEGREES: 90
OD_AXISANGLE_DEGREES: 180
OS_K2POWER_DIOPTERS: 45.50
OS_AXISANGLE_DEGREES: 180
OD_K1POWER_DIOPTERS: 45.50

## 2023-07-12 ASSESSMENT — TONOMETRY
OS_IOP_MMHG: 16
OD_IOP_MMHG: 18

## 2023-07-13 ASSESSMENT — KERATOMETRY
OD_K2POWER_DIOPTERS: 45.50
OS_K2POWER_DIOPTERS: 45.50
OS_AXISANGLE2_DEGREES: 90
OD_AXISANGLE_DEGREES: 180
OD_K1POWER_DIOPTERS: 45.50
OD_AXISANGLE2_DEGREES: 90
OS_AXISANGLE_DEGREES: 180
OS_K1POWER_DIOPTERS: 45.50

## 2023-07-14 LAB — INR PPP: 2.6 (ref 0.84–1.19)

## 2023-07-17 ENCOUNTER — ANTICOAG VISIT (OUTPATIENT)
Dept: CARDIOLOGY CLINIC | Facility: CLINIC | Age: 63
End: 2023-07-17

## 2023-07-17 DIAGNOSIS — Z95.2 S/P MVR (MITRAL VALVE REPLACEMENT): Primary | ICD-10-CM

## 2023-07-20 ENCOUNTER — 1 WEEK POST-OP (OUTPATIENT)
Dept: URBAN - METROPOLITAN AREA CLINIC 6 | Facility: CLINIC | Age: 63
End: 2023-07-20

## 2023-07-20 DIAGNOSIS — Z96.1: ICD-10-CM

## 2023-07-20 PROCEDURE — 99024 POSTOP FOLLOW-UP VISIT: CPT

## 2023-07-20 ASSESSMENT — TONOMETRY
OS_IOP_MMHG: 11
OD_IOP_MMHG: 13

## 2023-07-20 ASSESSMENT — KERATOMETRY
OD_AXISANGLE2_DEGREES: 90
OD_AXISANGLE_DEGREES: 180
OS_AXISANGLE2_DEGREES: 90
OS_AXISANGLE_DEGREES: 180
OD_K2POWER_DIOPTERS: 45.50
OS_K2POWER_DIOPTERS: 45.50
OD_K1POWER_DIOPTERS: 45.50
OS_K1POWER_DIOPTERS: 45.50

## 2023-07-20 ASSESSMENT — VISUAL ACUITY: OS_SC: 20/20

## 2023-08-11 LAB — INR PPP: 3.5 (ref 0.84–1.19)

## 2023-08-14 ENCOUNTER — ANTICOAG VISIT (OUTPATIENT)
Dept: CARDIOLOGY CLINIC | Facility: CLINIC | Age: 63
End: 2023-08-14

## 2023-08-14 DIAGNOSIS — Z95.2 S/P MVR (MITRAL VALVE REPLACEMENT): Primary | ICD-10-CM

## 2023-08-21 DIAGNOSIS — I10 ESSENTIAL HYPERTENSION: ICD-10-CM

## 2023-08-21 RX ORDER — ATENOLOL 25 MG/1
25 TABLET ORAL DAILY
Qty: 90 TABLET | Refills: 3 | Status: SHIPPED | OUTPATIENT
Start: 2023-08-21

## 2023-08-22 ENCOUNTER — 1 MONTH POST-OP (OUTPATIENT)
Dept: URBAN - METROPOLITAN AREA CLINIC 6 | Facility: CLINIC | Age: 63
End: 2023-08-22

## 2023-08-22 DIAGNOSIS — Z96.1: ICD-10-CM

## 2023-08-22 PROCEDURE — 99024 POSTOP FOLLOW-UP VISIT: CPT

## 2023-08-22 ASSESSMENT — KERATOMETRY
OS_AXISANGLE2_DEGREES: 90
OS_K2POWER_DIOPTERS: 45.50
OD_K2POWER_DIOPTERS: 45.50
OS_AXISANGLE_DEGREES: 180
OD_K1POWER_DIOPTERS: 45.50
OD_AXISANGLE_DEGREES: 180
OS_K1POWER_DIOPTERS: 45.50
OD_AXISANGLE2_DEGREES: 90

## 2023-08-22 ASSESSMENT — VISUAL ACUITY
OD_SC: 20/25
OS_SC: 20/25

## 2023-08-22 ASSESSMENT — TONOMETRY
OS_IOP_MMHG: 14
OD_IOP_MMHG: 12

## 2023-08-23 DIAGNOSIS — E03.9 HYPOTHYROIDISM, UNSPECIFIED TYPE: Primary | ICD-10-CM

## 2023-08-23 DIAGNOSIS — E03.9 HYPOTHYROIDISM, UNSPECIFIED TYPE: ICD-10-CM

## 2023-08-23 RX ORDER — LEVOTHYROXINE SODIUM 112 MCG
TABLET ORAL
Qty: 90 TABLET | Refills: 0 | Status: SHIPPED | OUTPATIENT
Start: 2023-08-23

## 2023-08-24 ENCOUNTER — APPOINTMENT (OUTPATIENT)
Dept: LAB | Facility: MEDICAL CENTER | Age: 63
End: 2023-08-24
Payer: COMMERCIAL

## 2023-08-24 DIAGNOSIS — E03.9 HYPOTHYROIDISM, UNSPECIFIED TYPE: ICD-10-CM

## 2023-08-24 LAB — TSH SERPL DL<=0.05 MIU/L-ACNC: 3.17 UIU/ML (ref 0.45–4.5)

## 2023-08-24 PROCEDURE — 84443 ASSAY THYROID STIM HORMONE: CPT

## 2023-08-25 ENCOUNTER — ANTICOAG VISIT (OUTPATIENT)
Dept: CARDIOLOGY CLINIC | Facility: CLINIC | Age: 63
End: 2023-08-25

## 2023-08-25 DIAGNOSIS — Z95.2 S/P MVR (MITRAL VALVE REPLACEMENT): Primary | ICD-10-CM

## 2023-08-28 DIAGNOSIS — Z95.2 S/P MVR (MITRAL VALVE REPLACEMENT): ICD-10-CM

## 2023-08-28 RX ORDER — WARFARIN SODIUM 2.5 MG/1
TABLET ORAL
Qty: 110 TABLET | Refills: 3 | Status: SHIPPED | OUTPATIENT
Start: 2023-08-28

## 2023-09-07 ENCOUNTER — ANTICOAG VISIT (OUTPATIENT)
Dept: CARDIOLOGY CLINIC | Facility: CLINIC | Age: 63
End: 2023-09-07

## 2023-09-07 DIAGNOSIS — Z95.2 S/P MVR (MITRAL VALVE REPLACEMENT): Primary | ICD-10-CM

## 2023-09-07 LAB — INR PPP: 3.3 (ref 0.84–1.19)

## 2023-09-14 ENCOUNTER — PROBLEM (OUTPATIENT)
Dept: URBAN - METROPOLITAN AREA CLINIC 6 | Facility: CLINIC | Age: 63
End: 2023-09-14

## 2023-09-14 DIAGNOSIS — H43.812: ICD-10-CM

## 2023-09-14 DIAGNOSIS — Z79.899: ICD-10-CM

## 2023-09-14 DIAGNOSIS — M32.10: ICD-10-CM

## 2023-09-14 DIAGNOSIS — D31.32: ICD-10-CM

## 2023-09-14 PROCEDURE — 92250 FUNDUS PHOTOGRAPHY W/I&R: CPT

## 2023-09-14 PROCEDURE — 99214 OFFICE O/P EST MOD 30 MIN: CPT | Mod: 24

## 2023-09-14 ASSESSMENT — KERATOMETRY
OS_K1POWER_DIOPTERS: 45.50
OS_K2POWER_DIOPTERS: 45.50
OS_AXISANGLE2_DEGREES: 90
OD_AXISANGLE_DEGREES: 180
OS_AXISANGLE_DEGREES: 180
OD_AXISANGLE2_DEGREES: 90
OD_K2POWER_DIOPTERS: 45.50
OD_K1POWER_DIOPTERS: 45.50

## 2023-09-14 ASSESSMENT — TONOMETRY
OS_IOP_MMHG: 18
OD_IOP_MMHG: 17

## 2023-09-14 ASSESSMENT — VISUAL ACUITY
OD_SC: 20/25-2
OS_SC: 20/25

## 2023-10-02 LAB — INR PPP: 2.9 (ref 0.84–1.19)

## 2023-10-03 ENCOUNTER — ANTICOAG VISIT (OUTPATIENT)
Dept: CARDIOLOGY CLINIC | Facility: CLINIC | Age: 63
End: 2023-10-03

## 2023-10-03 DIAGNOSIS — Z95.2 S/P MVR (MITRAL VALVE REPLACEMENT): Primary | ICD-10-CM

## 2023-11-08 ENCOUNTER — ANTICOAG VISIT (OUTPATIENT)
Dept: CARDIOLOGY CLINIC | Facility: CLINIC | Age: 63
End: 2023-11-08

## 2023-11-08 DIAGNOSIS — Z95.2 S/P MVR (MITRAL VALVE REPLACEMENT): Primary | ICD-10-CM

## 2023-11-08 LAB — INR PPP: 2.3 (ref 0.84–1.19)

## 2023-11-13 ENCOUNTER — CLINICAL SUPPORT (OUTPATIENT)
Dept: FAMILY MEDICINE CLINIC | Facility: CLINIC | Age: 63
End: 2023-11-13
Payer: COMMERCIAL

## 2023-11-13 DIAGNOSIS — Z23 ENCOUNTER FOR IMMUNIZATION: Primary | ICD-10-CM

## 2023-11-13 PROCEDURE — 90471 IMMUNIZATION ADMIN: CPT

## 2023-11-13 PROCEDURE — 90750 HZV VACC RECOMBINANT IM: CPT

## 2023-11-21 DIAGNOSIS — E03.9 HYPOTHYROIDISM, UNSPECIFIED TYPE: ICD-10-CM

## 2023-11-21 RX ORDER — LEVOTHYROXINE SODIUM 112 MCG
TABLET ORAL
Qty: 90 TABLET | Refills: 3 | Status: SHIPPED | OUTPATIENT
Start: 2023-11-21

## 2023-11-30 LAB — INR PPP: 2.6 (ref 0.84–1.19)

## 2023-12-01 ENCOUNTER — ANTICOAG VISIT (OUTPATIENT)
Dept: CARDIOLOGY CLINIC | Facility: CLINIC | Age: 63
End: 2023-12-01

## 2023-12-01 DIAGNOSIS — Z95.2 S/P MVR (MITRAL VALVE REPLACEMENT): Primary | ICD-10-CM

## 2023-12-19 ENCOUNTER — OFFICE VISIT (OUTPATIENT)
Dept: CARDIOLOGY CLINIC | Facility: CLINIC | Age: 63
End: 2023-12-19
Payer: COMMERCIAL

## 2023-12-19 VITALS
DIASTOLIC BLOOD PRESSURE: 78 MMHG | OXYGEN SATURATION: 96 % | WEIGHT: 198.3 LBS | BODY MASS INDEX: 37.44 KG/M2 | HEART RATE: 71 BPM | SYSTOLIC BLOOD PRESSURE: 124 MMHG | HEIGHT: 61 IN

## 2023-12-19 DIAGNOSIS — I10 ESSENTIAL HYPERTENSION: Primary | ICD-10-CM

## 2023-12-19 DIAGNOSIS — R00.2 PALPITATIONS: ICD-10-CM

## 2023-12-19 DIAGNOSIS — I34.0 MITRAL VALVE INSUFFICIENCY, UNSPECIFIED ETIOLOGY: ICD-10-CM

## 2023-12-19 DIAGNOSIS — Z95.2 S/P MVR (MITRAL VALVE REPLACEMENT): ICD-10-CM

## 2023-12-19 LAB — INR PPP: 2.4 (ref 0.84–1.19)

## 2023-12-19 PROCEDURE — 99214 OFFICE O/P EST MOD 30 MIN: CPT | Performed by: INTERNAL MEDICINE

## 2023-12-19 NOTE — PROGRESS NOTES
Cardiology   Juana Linares 63 y.o. female MRN: [id number]        Impression:  1. S/P mechanical MVR - doing well.  On Warfarin.  2. Multiple myeloma  3. Hypertension - controlled.  4. Palpitations - resolved.      Recommendations:  1. Continue current medications.   2. Follow up in one year.        HPI: Juana Linares is a 63 y.o. year old female with a history of mechancial MVR, HTN, SLE, multiple myeloma, who presents for follow up.  Echocardiogram 4/19 demonstrated normal LV systolic function with normal MVR. Diagnosed with multiple myeloma, and is starting chemo. No further palpitations.  No chest pain, shortness of breath.  Echo 2021 demonstrated normal MVR, and holter demonstrated no significant dysrhythmia. Admitted to [facility] 1/[facility] with pneumonia.  Echo at that time demonstrated normal LV systolic function. Currently has a URI        Review of Systems   Constitutional: Negative.    HENT: Negative.     Eyes: Negative.    Respiratory:  Positive for cough. Negative for chest tightness and shortness of breath.    Cardiovascular:  Negative for chest pain, palpitations and leg swelling.   Gastrointestinal: Negative.    Endocrine: Negative.    Genitourinary: Negative.    Musculoskeletal: Negative.    Skin: Negative.    Allergic/Immunologic: Negative.    Neurological: Negative.    Hematological: Negative.    Psychiatric/Behavioral: Negative.     All other systems reviewed and are negative.        Past Medical History:   Diagnosis Date    COVID-19 02/2021    Disease of thyroid gland     History of ITP     History of transfusion     Hypertension     Lyme disease     Sleep apnea      Past Surgical History:   Procedure Laterality Date    VALVE REPLACEMENT       Social History     Substance and Sexual Activity   Alcohol Use Not Currently    Comment: Social     Social History     Substance and Sexual Activity   Drug Use No     Social History     Tobacco Use   Smoking Status Never   Smokeless Tobacco Never     Family  History   Problem Relation Age of Onset    Diabetes Father     Other Father         Multiple myeloma     Coronary artery disease Maternal Grandmother     Diabetes Maternal Grandmother        Allergies:  Allergies   Allergen Reactions    Erythromycin     Penicillins Delirium and Hives    Percocet [Oxycodone-Acetaminophen] Nausea Only       Medications:     Current Outpatient Medications:     allopurinol (ZYLOPRIM) 100 mg tablet, Take 100 mg by mouth daily , Disp: , Rfl:     ascorbic acid (VITAMIN C) 500 mg tablet, Take 500 mg by mouth daily, Disp: , Rfl:     atenolol (TENORMIN) 25 mg tablet, TAKE 1 TABLET DAILY, Disp: 90 tablet, Rfl: 3    Cholecalciferol (VITAMIN D-3) 1000 units CAPS, Take 2,000 Units by mouth daily, Disp: , Rfl:     ergocalciferol (VITAMIN D2) 50,000 units, Take by mouth once a week , Disp: , Rfl:     hydroxychloroquine (PLAQUENIL) 200 mg tablet, Take by mouth, Disp: , Rfl:     Iron-Vitamin C 100-250 MG TABS, iron, Disp: , Rfl:     Magnesium 400 MG CAPS, Take 4 capsules by mouth daily , Disp: , Rfl:     Multiple Vitamin (multivitamin) capsule, Take 1 capsule by mouth daily, Disp: , Rfl:     pantoprazole (PROTONIX) 40 mg tablet, prn, Disp: , Rfl:     Probiotic Product (PROBIOTIC-10) CHEW, Chew, Disp: , Rfl:     Synthroid 112 MCG tablet, TAKE 1 TABLET DAILY, Disp: 90 tablet, Rfl: 3    valACYclovir (VALTREX) 1,000 mg tablet, Take 500 mg by mouth daily, Disp: , Rfl:     valsartan (DIOVAN) 320 MG tablet, Take 1 tablet (320 mg total) by mouth daily, Disp: 90 tablet, Rfl: 3    warfarin (Coumadin) 2.5 mg tablet, TAKE 1/2 TO 1 TABLET DAILY BY MOUTH OR AS DIRECTED BY PHYSICIAN., Disp: 30 tablet, Rfl: 3    warfarin (COUMADIN) 2.5 mg tablet, TAKE 1 TO 1 1/2  TABLETS  DAILY BY MOUTH  OR AS ORDERED BY PHYSICIAN, Disp: 110 tablet, Rfl: 3    predniSONE 5 mg tablet, Take 1 tablet by mouth daily (Patient not taking: Reported on 12/17/2021), Disp: , Rfl:       Wt Readings from Last 3 Encounters:   12/19/23 89.9 kg  (198 lb 4.8 oz)   23 87.1 kg (192 lb)   23 83.5 kg (184 lb)     Temp Readings from Last 3 Encounters:   23 98.3 °F (36.8 °C) (Tympanic)   23 97.7 °F (36.5 °C) (Temporal)   23 97.9 °F (36.6 °C) (Temporal)     BP Readings from Last 3 Encounters:   23 124/78   23 118/80   23 142/84     Pulse Readings from Last 3 Encounters:   23 71   23 76   23 64         Physical Exam  HENT:      Head: Atraumatic.      Mouth/Throat:      Mouth: Mucous membranes are moist.   Eyes:      Extraocular Movements: Extraocular movements intact.   Cardiovascular:      Rate and Rhythm: Normal rate and regular rhythm.      Comments: Mechanical heart sounds  Musculoskeletal:      Cervical back: Normal range of motion.   Neurological:      Mental Status: She is alert.           Laboratory Studies:  CMP:  Lab Results   Component Value Date    K 4.0 2022     2022    CO2 26 2022    BUN 30 (H) 2022    CREATININE 1.74 (H) 2022    AST 37 2022    ALT 29 2022    EGFR 31 2022         Cardiac testing:   EKG reviewed personally:   Results for orders placed during the hospital encounter of 21    Echo complete with contrast if indicated    Narrative  [place] [phone]    Transthoracic Echocardiogram  2D, M-mode, Doppler, and Color Doppler    Study date:  2021    Patient: MENA PABLO  MR number: [id number]  Account number: [id number]  : 1960  Age: 60 years  Gender: Female  Status: Outpatient  Location: [address] [place] [address] [facility]  Height: 61 in  Weight: 211 lb  BP: 148/ 90 mmHg    Indications: Palpitations    Diagnoses: R00.2 - Palpitations    Sonographer:  SHRADDHA Kraft  Primary Physician:  Kevin Quiroz DO  Referring Physician:  Iván Bianchi MD  Group:  [facility]  Interpreting Physician:  Ferny  MD Abbey    SUMMARY    LEFT VENTRICLE:  Systolic function was normal. Ejection fraction was estimated to be 60 %.  There were no regional wall motion abnormalities.    MITRAL VALVE:  A [employer] mechanical prosthesis was present. It exhibited normal function.  Mean transmitral gradient was 3 mmHg.    AORTIC VALVE:  There was no evidence for stenosis.    TRICUSPID VALVE:  There was trace regurgitation.  Pulmonary artery systolic pressure was mildly increased.  Estimated peak PA pressure was 38 mmHg.    HISTORY: PRIOR HISTORY: Hypertension, mechanical mitral valve replacement, sleep apnea, COVID-19    PROCEDURE: The study was performed in the [address] [place] [address] [facility]. This was a routine study. The transthoracic approach was used. The study included complete 2D imaging, M-mode, complete spectral Doppler, and color Doppler. Image  quality was adequate.    LEFT VENTRICLE: Size was normal. Systolic function was normal. Ejection fraction was estimated to be 60 %. There were no regional wall motion abnormalities. Wall thickness was normal. DOPPLER: The study was not technically sufficient to  allow evaluation of LV diastolic function.    RIGHT VENTRICLE: The size was normal. Systolic function was normal. Wall thickness was normal.    LEFT ATRIUM: Size was at the upper limits of normal.    RIGHT ATRIUM: Size was normal.    MITRAL VALVE: There was normal leaflet separation. A [employer] mechanical prosthesis was present. It exhibited normal function. DOPPLER: The transmitral velocity was within the normal range. There was no evidence for stenosis. There was no  significant regurgitation.    AORTIC VALVE: The valve was trileaflet. Leaflets exhibited normal thickness and normal cuspal separation. DOPPLER: Transaortic velocity was within the normal range. There was no evidence for stenosis. There was no regurgitation.    TRICUSPID VALVE: The valve structure was normal. There was normal leaflet separation. DOPPLER: The  transtricuspid velocity was within the normal range. There was no evidence for stenosis. There was trace regurgitation. Pulmonary artery  systolic pressure was mildly increased. Estimated peak PA pressure was 38 mmHg.    PULMONIC VALVE: Leaflets exhibited normal thickness, no calcification, and normal cuspal separation. DOPPLER: The transpulmonic velocity was within the normal range. There was mild regurgitation.    PERICARDIUM: There was no pericardial effusion. The pericardium was normal in appearance.    AORTA: The root exhibited normal size.    SYSTEMIC VEINS: IVC: The inferior vena cava was normal in size.    MEASUREMENT TABLES    DOPPLER MEASUREMENTS  Mitral valve   (Reference normals)  Mean gradient   3 mmHg   (--)    SYSTEM MEASUREMENT TABLES    2D  %FS: 36.11 %  Ao Diam: 3.17 cm  EDV(Teich): 121.95 ml  EF(Teich): 65.45 %  ESV(Teich): 42.13 ml  IVSd: 1.03 cm  LA Area: 21.06 cm2  LA Diam: 3.99 cm  LVEDV MOD A4C: 141.74 ml  LVEF MOD A4C: 69.35 %  LVESV MOD A4C: 43.44 ml  LVIDd: 5.07 cm  LVIDs: 3.24 cm  LVLd A4C: 7.49 cm  LVLs A4C: 6.06 cm  LVPWd: 1.12 cm  RA Area: 12.76 cm2  RVIDd: 3.93 cm  SV MOD A4C: 98.29 ml  SV(Teich): 79.82 ml    CW  TR Vmax: 2.87 m/s  TR maxP.92 mmHg    MM  TAPSE: 2.23 cm    PW  E' Sept: 0.07 m/s  E/E' Sept: 23.71  MV A Bobby: 1.15 m/s  MV Dec Sumner: 4.71 m/s2  MV DecT: 363.21 ms  MV E Bobby: 1.71 m/s  MV E/A Ratio: 1.49  MV PHT: 105.33 ms  MVA By PHT: 2.09 cm2    [employer] Accredited Echocardiography Laboratory    Prepared and electronically signed by    Ferny Potter MD  Signed 2021 15:28:27    No results found for this or any previous visit.    No results found for this or any previous visit.    No results found for this or any previous visit.

## 2023-12-20 ENCOUNTER — ANTICOAG VISIT (OUTPATIENT)
Dept: CARDIOLOGY CLINIC | Facility: CLINIC | Age: 63
End: 2023-12-20

## 2023-12-20 DIAGNOSIS — Z95.2 S/P MVR (MITRAL VALVE REPLACEMENT): Primary | ICD-10-CM

## 2023-12-28 ENCOUNTER — TELEPHONE (OUTPATIENT)
Dept: CARDIOLOGY CLINIC | Facility: CLINIC | Age: 63
End: 2023-12-28

## 2023-12-28 NOTE — TELEPHONE ENCOUNTER
P/C having a colonoscopy on Jan 9th,2024.     Dr Nelson is asking to hold coumadin 3 days prior to procedure    Please advise    T [phone]

## 2023-12-29 DIAGNOSIS — Z95.2 S/P MVR (MITRAL VALVE REPLACEMENT): Primary | ICD-10-CM

## 2023-12-29 RX ORDER — DOXYCYCLINE HYCLATE 100 MG/1
100 CAPSULE ORAL SEE ADMIN INSTRUCTIONS
Qty: 6 CAPSULE | Refills: 1 | Status: SHIPPED | OUTPATIENT
Start: 2023-12-29 | End: 2023-12-30

## 2023-12-29 NOTE — TELEPHONE ENCOUNTER
Requested medication(s) are due for refill today: yes  Patient has already received a courtesy refill: no  Other reason request has been forwarded to provider: failed

## 2023-12-29 NOTE — TELEPHONE ENCOUNTER
Spoke to Juana and advised Dr Bianchi message. Pt verbally understood. Advised will notify coumadin staff.

## 2024-01-04 ENCOUNTER — ESTABLISHED COMPREHENSIVE EXAM (OUTPATIENT)
Dept: URBAN - METROPOLITAN AREA CLINIC 6 | Facility: CLINIC | Age: 64
End: 2024-01-04

## 2024-01-04 DIAGNOSIS — D31.32: ICD-10-CM

## 2024-01-04 DIAGNOSIS — Z79.899: ICD-10-CM

## 2024-01-04 DIAGNOSIS — M32.10: ICD-10-CM

## 2024-01-04 DIAGNOSIS — H43.812: ICD-10-CM

## 2024-01-04 PROCEDURE — 92014 COMPRE OPH EXAM EST PT 1/>: CPT

## 2024-01-04 ASSESSMENT — KERATOMETRY
OD_K2POWER_DIOPTERS: 45.50
OD_AXISANGLE_DEGREES: 180
OS_K1POWER_DIOPTERS: 45.50
OS_AXISANGLE2_DEGREES: 90
OD_K1POWER_DIOPTERS: 45.50
OD_AXISANGLE2_DEGREES: 90
OS_AXISANGLE_DEGREES: 180
OS_K2POWER_DIOPTERS: 45.50

## 2024-01-04 ASSESSMENT — VISUAL ACUITY
OS_SC: 20/20-2
OD_SC: 20/20-1

## 2024-01-04 ASSESSMENT — TONOMETRY
OD_IOP_MMHG: 10
OS_IOP_MMHG: 9

## 2024-01-09 PROCEDURE — 88305 TISSUE EXAM BY PATHOLOGIST: CPT | Performed by: PATHOLOGY

## 2024-01-10 ENCOUNTER — LAB REQUISITION (OUTPATIENT)
Dept: LAB | Facility: HOSPITAL | Age: 64
End: 2024-01-10
Payer: COMMERCIAL

## 2024-01-10 DIAGNOSIS — K63.5 POLYP OF COLON: ICD-10-CM

## 2024-01-10 DIAGNOSIS — K57.30 DIVERTICULOSIS OF LARGE INTESTINE WITHOUT PERFORATION OR ABSCESS WITHOUT BLEEDING: ICD-10-CM

## 2024-01-10 DIAGNOSIS — Z86.010 PERSONAL HISTORY OF COLONIC POLYPS: ICD-10-CM

## 2024-01-12 PROCEDURE — 88305 TISSUE EXAM BY PATHOLOGIST: CPT | Performed by: PATHOLOGY

## 2024-01-24 DIAGNOSIS — Z95.2 S/P MVR (MITRAL VALVE REPLACEMENT): Primary | ICD-10-CM

## 2024-01-25 LAB — INR PPP: 2.2 (ref 0.84–1.19)

## 2024-01-26 ENCOUNTER — ANTICOAG VISIT (OUTPATIENT)
Dept: CARDIOLOGY CLINIC | Facility: CLINIC | Age: 64
End: 2024-01-26

## 2024-01-26 DIAGNOSIS — Z95.2 S/P MVR (MITRAL VALVE REPLACEMENT): Primary | ICD-10-CM

## 2024-01-30 ENCOUNTER — OFFICE VISIT (OUTPATIENT)
Dept: FAMILY MEDICINE CLINIC | Facility: CLINIC | Age: 64
End: 2024-01-30
Payer: COMMERCIAL

## 2024-01-30 ENCOUNTER — TELEPHONE (OUTPATIENT)
Age: 64
End: 2024-01-30

## 2024-01-30 ENCOUNTER — ANTICOAG VISIT (OUTPATIENT)
Dept: CARDIOLOGY CLINIC | Facility: CLINIC | Age: 64
End: 2024-01-30

## 2024-01-30 VITALS
WEIGHT: 196.8 LBS | HEIGHT: 61 IN | OXYGEN SATURATION: 94 % | DIASTOLIC BLOOD PRESSURE: 66 MMHG | RESPIRATION RATE: 16 BRPM | TEMPERATURE: 99.5 F | SYSTOLIC BLOOD PRESSURE: 145 MMHG | HEART RATE: 76 BPM | BODY MASS INDEX: 37.16 KG/M2

## 2024-01-30 DIAGNOSIS — U07.1 COVID-19 VIRUS INFECTION: Primary | ICD-10-CM

## 2024-01-30 DIAGNOSIS — Z95.2 S/P MVR (MITRAL VALVE REPLACEMENT): Primary | ICD-10-CM

## 2024-01-30 DIAGNOSIS — R69 SICK: ICD-10-CM

## 2024-01-30 LAB
SARS-COV-2 AG UPPER RESP QL IA: POSITIVE
SL AMB POCT RAPID FLU A: NEGATIVE
SL AMB POCT RAPID FLU B: NEGATIVE
VALID CONTROL: ABNORMAL

## 2024-01-30 PROCEDURE — 87804 INFLUENZA ASSAY W/OPTIC: CPT | Performed by: FAMILY MEDICINE

## 2024-01-30 PROCEDURE — 87811 SARS-COV-2 COVID19 W/OPTIC: CPT | Performed by: FAMILY MEDICINE

## 2024-01-30 PROCEDURE — 3078F DIAST BP <80 MM HG: CPT | Performed by: FAMILY MEDICINE

## 2024-01-30 PROCEDURE — 3077F SYST BP >= 140 MM HG: CPT | Performed by: FAMILY MEDICINE

## 2024-01-30 PROCEDURE — 99213 OFFICE O/P EST LOW 20 MIN: CPT | Performed by: FAMILY MEDICINE

## 2024-01-30 RX ORDER — NIRMATRELVIR AND RITONAVIR 150-100 MG
2 KIT ORAL 2 TIMES DAILY
Qty: 20 TABLET | Refills: 0 | Status: SHIPPED | OUTPATIENT
Start: 2024-01-30 | End: 2024-02-04

## 2024-01-30 NOTE — PROGRESS NOTES
Assessment/Plan:  Rapid COVID test is positive.  Rapid flu test negative.  Discussed treatment options with patient.  Including risk the benefits.  Patient will be started on Paxlovid at a decreased dose of 2 tablets twice daily for 5 days.  Recommend patient call Dr. Thakur, her nephrologist to discuss Paxlovid treatment.  Patient may take Tylenol and Robitussin as needed.  Recommend increase fluids and rest.  Patient to remain home on isolation for 5 days then mask for additional 5 days.  Return to the office in 1 week or call sooner as needed or report to the emergency room for worsening symptoms including shortness of breath.    No problem-specific Assessment & Plan notes found for this encounter.       Diagnoses and all orders for this visit:    COVID-19 virus infection  -     nirmatrelvir & ritonavir (Paxlovid, 150/100,) tablet therapy pack; Take 2 tablets by mouth 2 (two) times a day for 5 days Take 1 nirmatrelvir tablet + 1 ritonavir tablet together per dose    Sick  -     POCT rapid flu A and B  -     POCT Rapid Covid Ag          Subjective:      Patient ID: Juana Linares is a 63 y.o. female.    Patient started last night with cold symptoms.  She admits to fever to 100.8 this morning and headache.  She denies significant body aches.  She does admit to nasal congestion and cough but denies shortness of breath.  She has treated this with Tylenol cold without significant relief.    URI   This is a new problem. The current episode started yesterday. The problem has been gradually worsening. The maximum temperature recorded prior to her arrival was 100.4 - 100.9 F. Associated symptoms include congestion, coughing, headaches, a plugged ear sensation, rhinorrhea and sinus pain. Pertinent negatives include no abdominal pain, chest pain, ear pain, sneezing, sore throat or wheezing. She has tried acetaminophen and decongestant for the symptoms. The treatment provided mild relief.       The following portions of the  "patient's history were reviewed and updated as appropriate: allergies, current medications, past family history, past medical history, past social history, past surgical history, and problem list.    Review of Systems   HENT:  Positive for congestion, rhinorrhea and sinus pain. Negative for ear pain, sneezing and sore throat.    Respiratory:  Positive for cough. Negative for wheezing.    Cardiovascular:  Negative for chest pain.   Gastrointestinal:  Negative for abdominal pain.   Neurological:  Positive for headaches.         Objective:      /66 (BP Location: Left arm, Patient Position: Sitting, Cuff Size: Large)   Pulse 76   Temp 99.5 °F (37.5 °C) (Tympanic)   Resp 16   Ht 5' 1\" (1.549 m)   Wt 89.3 kg (196 lb 12.8 oz)   SpO2 94%   BMI 37.19 kg/m²          Physical Exam  Constitutional:       General: She is not in acute distress.     Appearance: Normal appearance. She is obese. She is ill-appearing. She is not toxic-appearing or diaphoretic.   HENT:      Head: Normocephalic.      Right Ear: Tympanic membrane normal.      Left Ear: Tympanic membrane normal.      Nose: Congestion present.      Mouth/Throat:      Mouth: Mucous membranes are moist.      Pharynx: Oropharynx is clear.   Eyes:      General: No scleral icterus.     Conjunctiva/sclera: Conjunctivae normal.   Cardiovascular:      Rate and Rhythm: Normal rate and regular rhythm.   Pulmonary:      Effort: Pulmonary effort is normal. No respiratory distress.      Breath sounds: Normal breath sounds. No wheezing.   Abdominal:      Palpations: Abdomen is soft.      Tenderness: There is no abdominal tenderness.   Musculoskeletal:      Cervical back: Neck supple.      Right lower leg: No edema.      Left lower leg: No edema.   Lymphadenopathy:      Cervical: No cervical adenopathy.   Skin:     General: Skin is warm and dry.   Neurological:      General: No focal deficit present.      Mental Status: She is alert and oriented to person, place, and time. "   Psychiatric:         Mood and Affect: Mood normal.         Behavior: Behavior normal.         Thought Content: Thought content normal.         Judgment: Judgment normal.

## 2024-01-30 NOTE — TELEPHONE ENCOUNTER
[facility] from [facility] called about pt paxlovid rx. They need a kidney level # before being able to fill medication. Warm transferred to walker

## 2024-02-05 ENCOUNTER — ANTICOAG VISIT (OUTPATIENT)
Dept: CARDIOLOGY CLINIC | Facility: CLINIC | Age: 64
End: 2024-02-05

## 2024-02-05 DIAGNOSIS — Z95.2 S/P MVR (MITRAL VALVE REPLACEMENT): Primary | ICD-10-CM

## 2024-02-05 LAB — INR PPP: 2.5 (ref 0.84–1.19)

## 2024-02-29 ENCOUNTER — ANTICOAG VISIT (OUTPATIENT)
Dept: CARDIOLOGY CLINIC | Facility: CLINIC | Age: 64
End: 2024-02-29

## 2024-02-29 DIAGNOSIS — Z95.2 S/P MVR (MITRAL VALVE REPLACEMENT): Primary | ICD-10-CM

## 2024-02-29 LAB — INR PPP: 2.6 (ref 0.84–1.19)

## 2024-03-29 ENCOUNTER — ANTICOAG VISIT (OUTPATIENT)
Dept: CARDIOLOGY CLINIC | Facility: CLINIC | Age: 64
End: 2024-03-29

## 2024-03-29 DIAGNOSIS — Z95.2 S/P MVR (MITRAL VALVE REPLACEMENT): Primary | ICD-10-CM

## 2024-03-29 LAB
INR PPP: 2.8
INR PPP: 2.8 (ref 0.84–1.19)
PROTHROMBIN TIME: 29.1 SEC (ref 12–14.6)

## 2024-04-01 ENCOUNTER — ANTICOAG VISIT (OUTPATIENT)
Dept: CARDIOLOGY CLINIC | Facility: CLINIC | Age: 64
End: 2024-04-01

## 2024-04-01 DIAGNOSIS — Z95.2 S/P MVR (MITRAL VALVE REPLACEMENT): Primary | ICD-10-CM

## 2024-04-02 ENCOUNTER — ANTICOAG VISIT (OUTPATIENT)
Dept: CARDIOLOGY CLINIC | Facility: CLINIC | Age: 64
End: 2024-04-02

## 2024-04-02 DIAGNOSIS — Z95.2 S/P MVR (MITRAL VALVE REPLACEMENT): Primary | ICD-10-CM

## 2024-04-22 ENCOUNTER — OFFICE VISIT (OUTPATIENT)
Dept: FAMILY MEDICINE CLINIC | Facility: CLINIC | Age: 64
End: 2024-04-22
Payer: COMMERCIAL

## 2024-04-22 VITALS
OXYGEN SATURATION: 96 % | HEART RATE: 76 BPM | SYSTOLIC BLOOD PRESSURE: 148 MMHG | WEIGHT: 199 LBS | DIASTOLIC BLOOD PRESSURE: 85 MMHG | HEIGHT: 61 IN | TEMPERATURE: 98 F | BODY MASS INDEX: 37.57 KG/M2

## 2024-04-22 DIAGNOSIS — J40 BRONCHITIS: Primary | ICD-10-CM

## 2024-04-22 DIAGNOSIS — D69.49 PRIMARY THROMBOCYTOPENIA (HCC): ICD-10-CM

## 2024-04-22 DIAGNOSIS — C90.00 MULTIPLE MYELOMA NOT HAVING ACHIEVED REMISSION (HCC): ICD-10-CM

## 2024-04-22 PROCEDURE — 99214 OFFICE O/P EST MOD 30 MIN: CPT | Performed by: FAMILY MEDICINE

## 2024-04-22 RX ORDER — AZITHROMYCIN 250 MG/1
TABLET, FILM COATED ORAL DAILY
Qty: 6 TABLET | Refills: 0 | Status: SHIPPED | OUTPATIENT
Start: 2024-04-22 | End: 2024-04-27

## 2024-04-22 NOTE — PROGRESS NOTES
Family Medicine Follow-Up Office Visit  Jauna Linares 63 y.o. female   MRN: [id number] : 1960  ENCOUNTER: 2024       Assessment and Plan   1. Bronchitis  Assessment & Plan:  Patient presenting with signs and symptoms consistent with possible bronchitis.    Recommendation for treatment with 5-day course of azithromycin.    Patient is advised to inform her warfarin management team of this additional medication so that they may check her PT/INR more frequently.    Recommend continued conservative management including increased rest and hydration.     Follow up in 1-2 weeks or sooner as needed should symptoms persist or worsen      Orders:  -     azithromycin (Zithromax) 250 mg tablet; Take 2 tablets (500 mg total) by mouth daily for 1 day, THEN 1 tablet (250 mg total) daily for 4 days.    2. Multiple myeloma not having achieved remission (HCC)  Assessment & Plan:  Patient continues with chemotherapy and follows with hematology oncology with Dr. Whitt at [facility].      3. Primary thrombocytopenia (HCC)  Assessment & Plan:  Continue regular follow-up with hematology oncology.            Depression Screening and Follow-up Plan: Patient was screened for depression during today's encounter. They screened negative with a PHQ-2 score of 0.        Chief Complaint     Chief Complaint   Patient presents with   • Cough   • Cold Like Symptoms   • chest congestion       History of Present Illness   Juana Linares is a 63 y.o.-year-old female with past medical history of hypertension, status post mitral valve replacement, DAVIDSON, hypothyroidism, osteopenia, stage IIIb CKD, ITP, multiple myeloma, SLE, Raynaud's who presents today for symptoms of cough and hoarse voice.    Patient notes that she started with symptoms last Wednesday to Thursday.  She notes taking Tylenol cold and flu for this which is helping with symptom management however her symptoms or not improving.  She notes that she initially started with allergy type  "symptoms with sneezing which is now progressed to coughing which is productive of mucus.  She notes associated tickle in her throat with hoarse voice.  She denies associated fever or chills.  She denies ear pain.  She does report some sinus pressure which is improving.  She does note rhinorrhea with yellow-green mucus.    Review of Systems   Review of Systems   Constitutional:  Negative for chills and fever.   HENT:  Positive for congestion, rhinorrhea and sinus pain. Negative for ear pain, sneezing and sore throat.    Respiratory:  Positive for cough. Negative for wheezing.    Cardiovascular:  Negative for chest pain.   Gastrointestinal:  Negative for abdominal pain.       Active Problem List     Patient Active Problem List   Diagnosis   • Obstructive sleep apnea syndrome   • Essential hypertension   • Hypothyroidism   • Systemic lupus erythematosus (HCC)   • Mitral valve regurgitation   • S/P MVR (mitral valve replacement)   • Anemia   • Disorder of immune system (HCC)   • Excessive anticoagulation   • History of meningioma   • Idiopathic thrombocytopenic purpura (HCC)   • Osteopenia   • Primary antiphospholipid syndrome (HCC)   • Primary thrombocytopenia (HCC)   • Raynaud's phenomenon   • Vitamin D deficiency   • Myeloma associated amyloidosis (HCC)   • Stage 3b chronic kidney disease (HCC)   • Presence of prosthetic heart valve   • Anticardiolipin antibody positive   • Palpitations   • Bronchitis       Past Medical History, Past Surgical History, Family History, and Social History were reviewed and updated today as appropriate.    Objective   /85 (BP Location: Left arm, Patient Position: Sitting, Cuff Size: Standard)   Pulse 76   Temp 98 °F (36.7 °C) (Tympanic)   Ht 5' 1\" (1.549 m)   Wt 90.3 kg (199 lb)   SpO2 96%   BMI 37.60 kg/m²     Physical Exam  Constitutional:       General: She is not in acute distress.     Appearance: Normal appearance. She is obese. She is ill-appearing. She is not " "toxic-appearing or diaphoretic.   HENT:      Head: Normocephalic.      Right Ear: Tympanic membrane normal.      Left Ear: Tympanic membrane normal.      Nose: Congestion present.      Mouth/Throat:      Mouth: Mucous membranes are moist.      Pharynx: Oropharynx is clear.   Eyes:      General: No scleral icterus.     Conjunctiva/sclera: Conjunctivae normal.   Cardiovascular:      Rate and Rhythm: Normal rate and regular rhythm.   Pulmonary:      Effort: Pulmonary effort is normal. No respiratory distress.      Breath sounds: Normal breath sounds. No wheezing.   Abdominal:      Palpations: Abdomen is soft.      Tenderness: There is no abdominal tenderness.   Musculoskeletal:      Cervical back: Neck supple.      Right lower leg: No edema.      Left lower leg: No edema.   Lymphadenopathy:      Cervical: No cervical adenopathy.   Skin:     General: Skin is warm and dry.   Neurological:      General: No focal deficit present.      Mental Status: She is alert and oriented to person, place, and time.   Psychiatric:         Mood and Affect: Mood normal.         Behavior: Behavior normal.         Thought Content: Thought content normal.         Judgment: Judgment normal.         Pertinent Laboratory/Diagnostic Studies:  Lab Results   Component Value Date    BUN 26 (H) 03/29/2024    CREATININE 1.36 (H) 03/29/2024    CALCIUM 9.2 03/29/2024    K 4.2 03/29/2024    CO2 25 03/29/2024     03/29/2024     Lab Results   Component Value Date    ALT 25 03/29/2024    AST 27 03/29/2024    ALKPHOS 79 03/29/2024       Lab Results   Component Value Date    WBC 6.01 06/02/2022    HGB 10.8 (L) 06/02/2022    HCT 33.0 (L) 06/02/2022    MCV 85 06/02/2022     (L) 06/02/2022       Lab Results   Component Value Date    TSH 1.30 07/15/2022       No results found for: \"CHOL\"  No results found for: \"TRIG\"  No results found for: \"HDL\"  No results found for: \"LDLCALC\"  Lab Results   Component Value Date    HGBA1C 6.4 (H) 10/20/2021 "       Results for orders placed or performed in visit on 03/29/24   Protime-INR   Result Value Ref Range    Prothrombin Time 29.1 (H) 12.0 - 14.6 sec    INR 2.8        No orders of the defined types were placed in this encounter.        Current Medications     Current Outpatient Medications   Medication Sig Dispense Refill   • allopurinol (ZYLOPRIM) 100 mg tablet Take 100 mg by mouth daily      • ascorbic acid (VITAMIN C) 500 mg tablet Take 500 mg by mouth daily     • atenolol (TENORMIN) 25 mg tablet TAKE 1 TABLET DAILY 90 tablet 3   • azithromycin (Zithromax) 250 mg tablet Take 2 tablets (500 mg total) by mouth daily for 1 day, THEN 1 tablet (250 mg total) daily for 4 days. 6 tablet 0   • Cholecalciferol (VITAMIN D-3) 1000 units CAPS Take 2,000 Units by mouth daily     • ergocalciferol (VITAMIN D2) 50,000 units Take by mouth once a week      • hydroxychloroquine (PLAQUENIL) 200 mg tablet Take by mouth     • Iron-Vitamin C 100-250 MG TABS iron     • Magnesium 400 MG CAPS Take 4 capsules by mouth daily      • Multiple Vitamin (multivitamin) capsule Take 1 capsule by mouth daily     • pantoprazole (PROTONIX) 40 mg tablet prn     • Probiotic Product (PROBIOTIC-10) CHEW Chew     • Synthroid 112 MCG tablet TAKE 1 TABLET DAILY 90 tablet 3   • valACYclovir (VALTREX) 1,000 mg tablet Take 500 mg by mouth daily     • valsartan (DIOVAN) 320 MG tablet Take 1 tablet (320 mg total) by mouth daily 90 tablet 3   • warfarin (Coumadin) 2.5 mg tablet TAKE 1/2 TO 1 TABLET DAILY BY MOUTH OR AS DIRECTED BY PHYSICIAN. 30 tablet 3   • warfarin (COUMADIN) 2.5 mg tablet TAKE 1 TO 1 1/2  TABLETS  DAILY BY MOUTH  OR AS ORDERED BY PHYSICIAN 110 tablet 3     No current facility-administered medications for this visit.       ALLERGIES:  Allergies   Allergen Reactions   • Erythromycin    • Penicillins Delirium and Hives   • Percocet [Oxycodone-Acetaminophen] Nausea Only       Health Maintenance     Health Maintenance   Topic Date Due   •  Pneumococcal Vaccine: Pediatrics (0 to 5 Years) and At-Risk Patients (6 to 64 Years) (1 of 2 - PCV) Never done   • HIV Screening  Never done   • DTaP,Tdap,and Td Vaccines (1 - Tdap) Never done   • Annual Physical  05/22/2020   • COVID-19 Vaccine (3 - Moderna risk series) 11/19/2021   • Cervical Cancer Screening  07/15/2022   • Colorectal Cancer Screening  07/09/2023   • Zoster Vaccine (2 of 2) 01/08/2024   • Breast Cancer Screening: Mammogram  12/06/2024   • Depression Screening  04/22/2025   • Hepatitis C Screening  Completed   • Osteoporosis Screening  Completed   • Influenza Vaccine  Completed   • HIB Vaccine  Aged Out   • IPV Vaccine  Aged Out   • Hepatitis A Vaccine  Aged Out   • Meningococcal ACWY Vaccine  Aged Out   • HPV Vaccine  Aged Out     Immunization History   Administered Date(s) Administered   • COVID-19 MODERNA VACC 0.5 ML IM 09/24/2021, 10/22/2021   • INFLUENZA 10/05/2018, 10/16/2019, 11/05/2021, 10/21/2022, 10/15/2023   • Influenza, injectable, quadrivalent, preservative free 0.5 mL 10/18/2019, 10/10/2020   • Influenza, recombinant, quadrivalent,injectable, preservative free 11/05/2021   • Tuberculin Skin Test-PPD Intradermal 04/10/2017, 05/28/2019   • Zoster Vaccine Recombinant 11/13/2023, 11/13/2023         Isi Izaguirre DO   [facility]  4/23/2024  3:02 PM    Parts of this note were dictated using Dragon dictation software and may have sounds-like errors due to variation in pronunciation.

## 2024-04-23 PROBLEM — J40 BRONCHITIS: Status: ACTIVE | Noted: 2024-04-23

## 2024-04-23 PROBLEM — Z79.4 ENCOUNTER FOR LONG-TERM (CURRENT) USE OF INSULIN (HCC): Status: RESOLVED | Noted: 2019-05-12 | Resolved: 2024-04-23

## 2024-04-23 PROBLEM — E85.9 MYELOMA ASSOCIATED AMYLOIDOSIS (HCC): Status: ACTIVE | Noted: 2022-07-13

## 2024-04-23 NOTE — ASSESSMENT & PLAN NOTE
Patient presenting with signs and symptoms consistent with possible bronchitis.    Recommendation for treatment with 5-day course of azithromycin.    Patient is advised to inform her warfarin management team of this additional medication so that they may check her PT/INR more frequently.    Recommend continued conservative management including increased rest and hydration.     Follow up in 1-2 weeks or sooner as needed should symptoms persist or worsen

## 2024-04-23 NOTE — ASSESSMENT & PLAN NOTE
Patient continues with chemotherapy and follows with hematology oncology with Dr. Whitt at [facility].   No

## 2024-04-30 ENCOUNTER — ANTICOAG VISIT (OUTPATIENT)
Dept: CARDIOLOGY CLINIC | Facility: CLINIC | Age: 64
End: 2024-04-30

## 2024-04-30 DIAGNOSIS — Z95.2 S/P MVR (MITRAL VALVE REPLACEMENT): Primary | ICD-10-CM

## 2024-04-30 LAB
INR PPP: 3
PROTHROMBIN TIME: 30.8 SEC (ref 12–14.6)

## 2024-05-22 ENCOUNTER — CLINICAL SUPPORT (OUTPATIENT)
Dept: FAMILY MEDICINE CLINIC | Facility: CLINIC | Age: 64
End: 2024-05-22
Payer: COMMERCIAL

## 2024-05-22 DIAGNOSIS — Z23 ENCOUNTER FOR IMMUNIZATION: Primary | ICD-10-CM

## 2024-05-22 PROCEDURE — 90471 IMMUNIZATION ADMIN: CPT

## 2024-05-22 PROCEDURE — 90750 HZV VACC RECOMBINANT IM: CPT

## 2024-05-25 LAB
INR PPP: 2.7
INR PPP: 2.7 (ref 0.84–1.19)
PROTHROMBIN TIME: 28.6 SEC (ref 12–14.6)

## 2024-05-28 ENCOUNTER — ANTICOAG VISIT (OUTPATIENT)
Dept: CARDIOLOGY CLINIC | Facility: CLINIC | Age: 64
End: 2024-05-28

## 2024-05-28 DIAGNOSIS — Z95.2 S/P MVR (MITRAL VALVE REPLACEMENT): Primary | ICD-10-CM

## 2024-05-28 DIAGNOSIS — Z95.2 S/P MVR (MITRAL VALVE REPLACEMENT): ICD-10-CM

## 2024-05-28 RX ORDER — WARFARIN SODIUM 2.5 MG/1
TABLET ORAL
Qty: 110 TABLET | Refills: 3 | Status: SHIPPED | OUTPATIENT
Start: 2024-05-28

## 2024-06-02 ENCOUNTER — OFFICE VISIT (OUTPATIENT)
Dept: URGENT CARE | Facility: MEDICAL CENTER | Age: 64
End: 2024-06-02
Payer: COMMERCIAL

## 2024-06-02 VITALS
OXYGEN SATURATION: 98 % | DIASTOLIC BLOOD PRESSURE: 79 MMHG | TEMPERATURE: 99.1 F | RESPIRATION RATE: 18 BRPM | SYSTOLIC BLOOD PRESSURE: 177 MMHG | HEART RATE: 61 BPM

## 2024-06-02 DIAGNOSIS — J01.00 ACUTE MAXILLARY SINUSITIS, RECURRENCE NOT SPECIFIED: Primary | ICD-10-CM

## 2024-06-02 DIAGNOSIS — J40 BRONCHITIS: ICD-10-CM

## 2024-06-02 PROCEDURE — 99213 OFFICE O/P EST LOW 20 MIN: CPT | Performed by: PHYSICIAN ASSISTANT

## 2024-06-02 RX ORDER — ALBUTEROL SULFATE 90 UG/1
2 AEROSOL, METERED RESPIRATORY (INHALATION) EVERY 6 HOURS PRN
Qty: 8.5 G | Refills: 0 | Status: SHIPPED | OUTPATIENT
Start: 2024-06-02

## 2024-06-02 RX ORDER — CLINDAMYCIN HYDROCHLORIDE 300 MG/1
300 CAPSULE ORAL 3 TIMES DAILY
Qty: 21 CAPSULE | Refills: 0 | Status: SHIPPED | OUTPATIENT
Start: 2024-06-02 | End: 2024-06-09

## 2024-06-02 RX ORDER — BENZONATATE 100 MG/1
100 CAPSULE ORAL 3 TIMES DAILY PRN
Qty: 20 CAPSULE | Refills: 0 | Status: SHIPPED | OUTPATIENT
Start: 2024-06-02

## 2024-06-02 RX ORDER — CLINDAMYCIN HYDROCHLORIDE 150 MG/1
CAPSULE ORAL
COMMUNITY
Start: 2024-05-20

## 2024-06-02 NOTE — PROGRESS NOTES
[facility]        NAME: Juana Linares is a 63 y.o. female  : 1960    MRN: [id number]  DATE: 2024  TIME: 1:42 PM    BP (!) 177/79   Pulse 61   Temp 99.1 °F (37.3 °C)   Resp 18   SpO2 98%     Assessment and Plan   Acute maxillary sinusitis, recurrence not specified [J01.00]  1. Acute maxillary sinusitis, recurrence not specified  benzonatate (TESSALON PERLES) 100 mg capsule    albuterol (ProAir HFA) 90 mcg/act inhaler    clindamycin (CLEOCIN) 300 MG capsule      2. Bronchitis  benzonatate (TESSALON PERLES) 100 mg capsule    albuterol (ProAir HFA) 90 mcg/act inhaler    clindamycin (CLEOCIN) 300 MG capsule            Patient Instructions       Follow up with PCP in 3-5 days.  Proceed to  ER if symptoms worsen.    Chief Complaint     Chief Complaint   Patient presents with    Cold Like Symptoms     Patient c/o cough with nasal congestion, throat irration and wheezing x 5-6 days          History of Present Illness       Pt with sinus congestion and productive cough x 1 week         Review of Systems   Review of Systems   Constitutional: Negative.    HENT:  Positive for congestion, sinus pressure and sinus pain.    Eyes: Negative.    Respiratory:  Positive for cough.    Cardiovascular: Negative.    Gastrointestinal: Negative.    Endocrine: Negative.    Genitourinary: Negative.    Musculoskeletal: Negative.    Skin: Negative.    Allergic/Immunologic: Negative.    Neurological: Negative.    Hematological: Negative.    Psychiatric/Behavioral: Negative.     All other systems reviewed and are negative.        Current Medications       Current Outpatient Medications:     albuterol (ProAir HFA) 90 mcg/act inhaler, Inhale 2 puffs every 6 (six) hours as needed for wheezing, Disp: 8.5 g, Rfl: 0    benzonatate (TESSALON PERLES) 100 mg capsule, Take 1 capsule (100 mg total) by mouth 3 (three) times a day as needed for cough, Disp: 20 capsule, Rfl: 0    allopurinol (ZYLOPRIM) 100 mg tablet, Take 100 mg by  mouth daily , Disp: , Rfl:     ascorbic acid (VITAMIN C) 500 mg tablet, Take 500 mg by mouth daily, Disp: , Rfl:     atenolol (TENORMIN) 25 mg tablet, TAKE 1 TABLET DAILY, Disp: 90 tablet, Rfl: 3    Cholecalciferol (VITAMIN D-3) 1000 units CAPS, Take 2,000 Units by mouth daily, Disp: , Rfl:     clindamycin (CLEOCIN) 150 mg capsule, , Disp: , Rfl:     clindamycin (CLEOCIN) 300 MG capsule, Take 1 capsule (300 mg total) by mouth 3 (three) times a day for 7 days (Patient not taking: Reported on 6/2/2024), Disp: 21 capsule, Rfl: 0    ergocalciferol (VITAMIN D2) 50,000 units, Take by mouth once a week , Disp: , Rfl:     hydroxychloroquine (PLAQUENIL) 200 mg tablet, Take by mouth, Disp: , Rfl:     Iron-Vitamin C 100-250 MG TABS, iron, Disp: , Rfl:     Magnesium 400 MG CAPS, Take 4 capsules by mouth daily , Disp: , Rfl:     Multiple Vitamin (multivitamin) capsule, Take 1 capsule by mouth daily, Disp: , Rfl:     pantoprazole (PROTONIX) 40 mg tablet, prn, Disp: , Rfl:     Probiotic Product (PROBIOTIC-10) CHEW, Chew, Disp: , Rfl:     Synthroid 112 MCG tablet, TAKE 1 TABLET DAILY, Disp: 90 tablet, Rfl: 3    valACYclovir (VALTREX) 1,000 mg tablet, Take 500 mg by mouth daily, Disp: , Rfl:     valsartan (DIOVAN) 320 MG tablet, Take 1 tablet (320 mg total) by mouth daily, Disp: 90 tablet, Rfl: 3    warfarin (Coumadin) 2.5 mg tablet, TAKE 1/2 TO 1 TABLET DAILY BY MOUTH OR AS DIRECTED BY PHYSICIAN., Disp: 30 tablet, Rfl: 3    warfarin (COUMADIN) 2.5 mg tablet, TAKE 1  TO  1 -1/2 TABLETS DAILY OR AS ORDERED BY PHYSICIAN, Disp: 110 tablet, Rfl: 3    Current Allergies     Allergies as of 06/02/2024 - Reviewed 06/02/2024   Allergen Reaction Noted    Erythromycin  01/08/2015    Penicillins Delirium and Hives 06/14/2012    Percocet [oxycodone-acetaminophen] Nausea Only 04/24/2018            The following portions of the patient's history were reviewed and updated as appropriate: allergies, current medications, past family history, past  medical history, past social history, past surgical history and problem list.     Past Medical History:   Diagnosis Date    COVID-19 02/2021    Disease of thyroid gland     History of ITP     History of transfusion     Hypertension     Lyme disease     Sleep apnea        Past Surgical History:   Procedure Laterality Date    VALVE REPLACEMENT         Family History   Problem Relation Age of Onset    Diabetes Father     Other Father         Multiple myeloma     Coronary artery disease Maternal Grandmother     Diabetes Maternal Grandmother          Medications have been verified.        Objective   BP (!) 177/79   Pulse 61   Temp 99.1 °F (37.3 °C)   Resp 18   SpO2 98%        Physical Exam     Physical Exam  Vitals and nursing note reviewed.   Constitutional:       Appearance: Normal appearance. She is normal weight.      Comments: Pt rx for cleocin prior is for pre dental procedures pt not taking at this time    HENT:      Head: Normocephalic and atraumatic.      Right Ear: Tympanic membrane, ear canal and external ear normal.      Left Ear: Tympanic membrane, ear canal and external ear normal.      Nose: Congestion and rhinorrhea present.      Mouth/Throat:      Mouth: Mucous membranes are moist.   Eyes:      Extraocular Movements: Extraocular movements intact.      Pupils: Pupils are equal, round, and reactive to light.   Cardiovascular:      Rate and Rhythm: Normal rate and regular rhythm.      Pulses: Normal pulses.      Heart sounds: Normal heart sounds.   Pulmonary:      Effort: Pulmonary effort is normal.      Comments: Minor coarse sounds cleared with cough   Musculoskeletal:      Cervical back: Normal range of motion and neck supple.   Neurological:      Mental Status: She is alert and oriented to person, place, and time.

## 2024-06-28 ENCOUNTER — ANTICOAG VISIT (OUTPATIENT)
Dept: CARDIOLOGY CLINIC | Facility: CLINIC | Age: 64
End: 2024-06-28

## 2024-06-28 DIAGNOSIS — Z95.2 S/P MVR (MITRAL VALVE REPLACEMENT): Primary | ICD-10-CM

## 2024-07-23 ENCOUNTER — ANTICOAG VISIT (OUTPATIENT)
Dept: CARDIOLOGY CLINIC | Facility: CLINIC | Age: 64
End: 2024-07-23

## 2024-07-23 DIAGNOSIS — Z95.2 S/P MVR (MITRAL VALVE REPLACEMENT): Primary | ICD-10-CM

## 2024-08-14 DIAGNOSIS — I10 ESSENTIAL HYPERTENSION: ICD-10-CM

## 2024-08-14 RX ORDER — ATENOLOL 25 MG/1
25 TABLET ORAL DAILY
Qty: 90 TABLET | Refills: 1 | Status: SHIPPED | OUTPATIENT
Start: 2024-08-14

## 2024-09-12 ENCOUNTER — OFFICE VISIT (OUTPATIENT)
Dept: FAMILY MEDICINE CLINIC | Facility: CLINIC | Age: 64
End: 2024-09-12
Payer: COMMERCIAL

## 2024-09-12 VITALS
BODY MASS INDEX: 38.33 KG/M2 | SYSTOLIC BLOOD PRESSURE: 134 MMHG | HEART RATE: 81 BPM | TEMPERATURE: 100.9 F | WEIGHT: 203 LBS | HEIGHT: 61 IN | OXYGEN SATURATION: 97 % | DIASTOLIC BLOOD PRESSURE: 80 MMHG

## 2024-09-12 DIAGNOSIS — D89.9 DISORDER OF IMMUNE SYSTEM (HCC): ICD-10-CM

## 2024-09-12 DIAGNOSIS — J40 BRONCHITIS: Primary | ICD-10-CM

## 2024-09-12 DIAGNOSIS — J01.00 ACUTE MAXILLARY SINUSITIS, RECURRENCE NOT SPECIFIED: ICD-10-CM

## 2024-09-12 DIAGNOSIS — Z11.4 SCREENING FOR HIV (HUMAN IMMUNODEFICIENCY VIRUS): ICD-10-CM

## 2024-09-12 DIAGNOSIS — M32.9 SYSTEMIC LUPUS ERYTHEMATOSUS, UNSPECIFIED SLE TYPE, UNSPECIFIED ORGAN INVOLVEMENT STATUS (HCC): ICD-10-CM

## 2024-09-12 DIAGNOSIS — R69 SICK: ICD-10-CM

## 2024-09-12 DIAGNOSIS — E85.9 MYELOMA ASSOCIATED AMYLOIDOSIS (HCC): ICD-10-CM

## 2024-09-12 DIAGNOSIS — C90.00 MYELOMA ASSOCIATED AMYLOIDOSIS (HCC): ICD-10-CM

## 2024-09-12 LAB
SARS-COV-2 AG UPPER RESP QL IA: NEGATIVE
VALID CONTROL: NORMAL

## 2024-09-12 PROCEDURE — 99214 OFFICE O/P EST MOD 30 MIN: CPT | Performed by: FAMILY MEDICINE

## 2024-09-12 PROCEDURE — 87811 SARS-COV-2 COVID19 W/OPTIC: CPT | Performed by: FAMILY MEDICINE

## 2024-09-12 RX ORDER — AZITHROMYCIN 250 MG/1
TABLET, FILM COATED ORAL
Qty: 6 TABLET | Refills: 0 | Status: SHIPPED | OUTPATIENT
Start: 2024-09-12 | End: 2024-09-19

## 2024-09-12 RX ORDER — PREDNISONE 10 MG/1
TABLET ORAL
Qty: 33 TABLET | Refills: 0 | Status: SHIPPED | OUTPATIENT
Start: 2024-09-12

## 2024-09-12 RX ORDER — ALBUTEROL SULFATE 90 UG/1
2 AEROSOL, METERED RESPIRATORY (INHALATION) EVERY 6 HOURS PRN
Qty: 8.5 G | Refills: 0 | Status: SHIPPED | OUTPATIENT
Start: 2024-09-12

## 2024-09-12 NOTE — PROGRESS NOTES
Assessment/Plan: Side effect profile of medication reviewed.  Recommend return to office if no improvement or worsening symptoms.  Recommend adding albuterol inhaler as well.  Patient states she has tolerated azithromycin in the past although she has GI intolerance to erythromycin.     1. Bronchitis  -     albuterol (ProAir HFA) 90 mcg/act inhaler; Inhale 2 puffs every 6 (six) hours as needed for wheezing  -     azithromycin (ZITHROMAX) 250 mg tablet; Take 2 tablets today then 1 tablet daily x 4 days  -     predniSONE 10 mg tablet; 6 tablets daily, all at one time, with food.  Then on day #4 decrease by 1 pill each day until finished.  2. Acute maxillary sinusitis, recurrence not specified  -     albuterol (ProAir HFA) 90 mcg/act inhaler; Inhale 2 puffs every 6 (six) hours as needed for wheezing  3. Screening for HIV (human immunodeficiency virus)  -     HIV 1/2 AG/AB w Reflex [facility] for 2 yr old and above; Future  4. Sick  -     POCT Rapid Covid Ag  5. Systemic lupus erythematosus, unspecified SLE type, unspecified organ involvement status (HCC)  6. Myeloma associated amyloidosis (HCC)  7. Disorder of immune system (HCC)        Subjective:      Patient ID: Juana Linares is a 64 y.o. female.    Patient is seen for cough and congestion.  Her COVID testing today is negative.  Patient has other chronic medical conditions that are currently stable.  She follows with Dr. Quiroz for these.  She denies any recent fevers.  No GI complaints.  She has some mild fatigue but no significant shortness of breath.    Cough  Associated symptoms include postnasal drip. Pertinent negatives include no fever.   Sinus Problem  Associated symptoms include congestion and coughing.   Generalized Body Aches  Associated symptoms include congestion and coughing. Pertinent negatives include no fever.   Fever  Associated symptoms include congestion and coughing. Pertinent negatives include no fever.            The following portions of the  "patient's history were reviewed and updated as appropriate: allergies, current medications, past family history, past medical history, past social history, past surgical history, and problem list.    Review of Systems   Constitutional: Negative.  Negative for fever.   HENT:  Positive for congestion and postnasal drip.    Eyes: Negative.    Respiratory:  Positive for cough.    Cardiovascular: Negative.    Gastrointestinal: Negative.    Endocrine: Negative.    Genitourinary: Negative.    Musculoskeletal: Negative.    Skin: Negative.    Allergic/Immunologic: Negative.    Neurological: Negative.    Hematological: Negative.    Psychiatric/Behavioral: Negative.           Objective:      /80 (BP Location: Left arm, Patient Position: Sitting, Cuff Size: Large)   Pulse 81   Temp (!) 100.9 °F (38.3 °C) (Tympanic)   Ht 5' 1\" (1.549 m)   Wt 92.1 kg (203 lb)   SpO2 97%   BMI 38.36 kg/m²          Physical Exam  Vitals reviewed.   Constitutional:       Appearance: She is well-developed.   HENT:      Head: Normocephalic and atraumatic.      Right Ear: External ear normal. Tympanic membrane is not erythematous or bulging.      Left Ear: External ear normal. Tympanic membrane is not erythematous or bulging.      Nose: Nose normal.      Mouth/Throat:      Mouth: No oral lesions.      Pharynx: No oropharyngeal exudate.   Eyes:      General: No scleral icterus.        Right eye: No discharge.         Left eye: No discharge.      Conjunctiva/sclera: Conjunctivae normal.   Neck:      Thyroid: No thyromegaly.   Cardiovascular:      Rate and Rhythm: Normal rate and regular rhythm.      Heart sounds: Normal heart sounds. No murmur heard.     No friction rub. No gallop.   Pulmonary:      Effort: Pulmonary effort is normal. No respiratory distress.      Breath sounds: No wheezing or rales.   Chest:      Chest wall: No tenderness.   Abdominal:      General: Bowel sounds are normal. There is no distension.      Palpations: Abdomen is " soft. There is no mass.      Tenderness: There is no abdominal tenderness. There is no guarding or rebound.   Musculoskeletal:         General: No tenderness or deformity. Normal range of motion.      Cervical back: Normal range of motion and neck supple.   Lymphadenopathy:      Cervical: No cervical adenopathy.   Skin:     General: Skin is warm and dry.      Coloration: Skin is not pale.      Findings: No erythema or rash.   Neurological:      Mental Status: She is alert and oriented to person, place, and time.      Cranial Nerves: No cranial nerve deficit.      Motor: No abnormal muscle tone.      Coordination: Coordination normal.      Deep Tendon Reflexes: Reflexes are normal and symmetric.   Psychiatric:         Behavior: Behavior normal.

## 2024-09-16 ENCOUNTER — ANTICOAG VISIT (OUTPATIENT)
Dept: CARDIOLOGY CLINIC | Facility: CLINIC | Age: 64
End: 2024-09-16

## 2024-09-16 DIAGNOSIS — Z95.2 S/P MVR (MITRAL VALVE REPLACEMENT): Primary | ICD-10-CM

## 2024-10-03 ENCOUNTER — ANTICOAG VISIT (OUTPATIENT)
Dept: CARDIOLOGY CLINIC | Facility: CLINIC | Age: 64
End: 2024-10-03

## 2024-10-03 DIAGNOSIS — Z95.2 S/P MVR (MITRAL VALVE REPLACEMENT): Primary | ICD-10-CM

## 2024-11-08 ENCOUNTER — ANTICOAG VISIT (OUTPATIENT)
Dept: CARDIOLOGY CLINIC | Facility: CLINIC | Age: 64
End: 2024-11-08

## 2024-11-08 DIAGNOSIS — Z95.2 S/P MVR (MITRAL VALVE REPLACEMENT): Primary | ICD-10-CM

## 2024-11-08 LAB — INR PPP: 3.7 (ref 0.85–1.19)

## 2024-11-11 ENCOUNTER — ANTICOAG VISIT (OUTPATIENT)
Dept: CARDIOLOGY CLINIC | Facility: CLINIC | Age: 64
End: 2024-11-11

## 2024-11-11 DIAGNOSIS — Z95.2 S/P MVR (MITRAL VALVE REPLACEMENT): Primary | ICD-10-CM

## 2024-11-15 ENCOUNTER — TELEPHONE (OUTPATIENT)
Dept: FAMILY MEDICINE CLINIC | Facility: CLINIC | Age: 64
End: 2024-11-15

## 2024-11-15 ENCOUNTER — TELEPHONE (OUTPATIENT)
Age: 64
End: 2024-11-15

## 2024-11-15 DIAGNOSIS — E03.9 HYPOTHYROIDISM, UNSPECIFIED TYPE: Primary | ICD-10-CM

## 2024-11-15 DIAGNOSIS — E03.9 HYPOTHYROIDISM, UNSPECIFIED TYPE: ICD-10-CM

## 2024-11-15 RX ORDER — LEVOTHYROXINE SODIUM 112 MCG
112 TABLET ORAL DAILY
Qty: 90 TABLET | Refills: 0 | Status: SHIPPED | OUTPATIENT
Start: 2024-11-15

## 2024-11-15 NOTE — TELEPHONE ENCOUNTER
Rcvd call from [employer]/ [employer] calling to ask for a waiver for [facility] so that patent can save $260.00 on synthroid. Stated it is a time sensitive matter. Asked for return call from provider to # [phone]. Reference # [id number].

## 2024-11-18 NOTE — TELEPHONE ENCOUNTER
Spoke with [facility]. They are dispensing Synthroid 112 mcg. This is [employer] house brand according to [facility]

## 2024-11-20 LAB — TSH SERPL-ACNC: 1.25 UIU/ML (ref 0.45–5.33)

## 2024-11-21 ENCOUNTER — RESULTS FOLLOW-UP (OUTPATIENT)
Dept: FAMILY MEDICINE CLINIC | Facility: CLINIC | Age: 64
End: 2024-11-21

## 2024-11-21 ENCOUNTER — ANTICOAG VISIT (OUTPATIENT)
Dept: CARDIOLOGY CLINIC | Facility: CLINIC | Age: 64
End: 2024-11-21

## 2024-11-21 DIAGNOSIS — Z95.2 S/P MVR (MITRAL VALVE REPLACEMENT): Primary | ICD-10-CM

## 2024-12-06 ENCOUNTER — TELEPHONE (OUTPATIENT)
Age: 64
End: 2024-12-06

## 2024-12-06 NOTE — TELEPHONE ENCOUNTER
Spoke with pt let her know that the provider would need to see her, we have no appts left for today advised her to go to care now

## 2024-12-06 NOTE — TELEPHONE ENCOUNTER
Pt hasn't been feeling well all week and came down with a 100.1 fever today. There were no available appts I could schedule for her today and pt would rather not go to [facility]. Pt want's to know if there is anything she can be prescribed.    Please advise

## 2024-12-16 ENCOUNTER — ANTICOAG VISIT (OUTPATIENT)
Dept: CARDIOLOGY CLINIC | Facility: CLINIC | Age: 64
End: 2024-12-16

## 2024-12-16 DIAGNOSIS — Z95.2 S/P MVR (MITRAL VALVE REPLACEMENT): Primary | ICD-10-CM

## 2025-01-02 ENCOUNTER — ANTICOAG VISIT (OUTPATIENT)
Dept: CARDIOLOGY CLINIC | Facility: CLINIC | Age: 65
End: 2025-01-02

## 2025-01-02 DIAGNOSIS — Z95.2 S/P MVR (MITRAL VALVE REPLACEMENT): Primary | ICD-10-CM

## 2025-01-27 ENCOUNTER — OFFICE VISIT (OUTPATIENT)
Dept: FAMILY MEDICINE CLINIC | Facility: CLINIC | Age: 65
End: 2025-01-27
Payer: COMMERCIAL

## 2025-01-27 VITALS
HEIGHT: 61 IN | SYSTOLIC BLOOD PRESSURE: 160 MMHG | OXYGEN SATURATION: 97 % | WEIGHT: 206.8 LBS | HEART RATE: 80 BPM | DIASTOLIC BLOOD PRESSURE: 80 MMHG | BODY MASS INDEX: 39.04 KG/M2 | TEMPERATURE: 98.9 F

## 2025-01-27 DIAGNOSIS — J01.00 ACUTE NON-RECURRENT MAXILLARY SINUSITIS: Primary | ICD-10-CM

## 2025-01-27 LAB
SARS-COV-2 AG UPPER RESP QL IA: NEGATIVE
VALID CONTROL: NORMAL

## 2025-01-27 PROCEDURE — 87811 SARS-COV-2 COVID19 W/OPTIC: CPT | Performed by: FAMILY MEDICINE

## 2025-01-27 PROCEDURE — 99213 OFFICE O/P EST LOW 20 MIN: CPT | Performed by: FAMILY MEDICINE

## 2025-01-27 RX ORDER — VALACYCLOVIR HYDROCHLORIDE 500 MG/1
TABLET, FILM COATED ORAL
COMMUNITY
Start: 2025-01-09

## 2025-01-27 RX ORDER — FOLIC ACID 1 MG/1
1 TABLET ORAL DAILY
COMMUNITY

## 2025-01-27 RX ORDER — DOXYCYCLINE HYCLATE 100 MG
100 TABLET ORAL 2 TIMES DAILY
Qty: 14 TABLET | Refills: 0 | Status: SHIPPED | OUTPATIENT
Start: 2025-01-27 | End: 2025-02-03

## 2025-01-27 NOTE — PROGRESS NOTES
"Name: Juana Linares      : 1960      MRN: [id number]  Encounter Provider: Isi Izaguirre DO  Encounter Date: 2025   Encounter department: [facility]  :  Assessment & Plan  Acute non-recurrent maxillary sinusitis    Orders:  •  POCT Rapid Covid Ag  •  doxycycline hyclate (VIBRA-TABS) 100 mg tablet; Take 1 tablet (100 mg total) by mouth 2 (two) times a day for 7 days    Myeloma associated amyloidosis (HCC)                History of Present Illness {?Quick Links Encounters * My Last Note * Last Note in Specialty * Snapshot * Since Last Visit * History :[id number]}  Friday morning horse voice and coughing - congestion a few days earlier. Wheezing or rattling in chest. Coughing up greenish yellow sputum. No fevers or chills. +fatigue. R ear pain started this morning.     URI   Associated symptoms include chest pain.   Chest Pain       Review of Systems   Cardiovascular:  Positive for chest pain.       Objective {?Quick Links Trend Vitals * Enter New Vitals * Results Review * Timeline (Adult) * Labs * Imaging * Cardiology * Procedures * Lung Cancer Screening * Surgical eConsent :[id number]}  /80 (BP Location: Left arm, Patient Position: Sitting, Cuff Size: Large)   Pulse 80   Temp 98.9 °F (37.2 °C) (Tympanic)   Ht 5' 1\" (1.549 m)   Wt 93.8 kg (206 lb 12.8 oz)   SpO2 97%   BMI 39.07 kg/m²      Physical Exam  {Administrative / Billing Section (Optional):55181}  "

## 2025-01-27 NOTE — PROGRESS NOTES
"Name: Juana Linares      : 1960      MRN: [id number]  Encounter Provider: Isi Izaguirre DO  Encounter Date: 2025   Encounter department: [facility]  :  Assessment & Plan  Acute non-recurrent maxillary sinusitis    Patient with signs and symptoms consistent with maxillary sinusitis.  Rapid COVID testing is negative at today's visit.  Recommend antibiotic course as noted below.  Recommend Flonase as well as sinus rinses over-the-counter.  Continue conservative management with increased rest and hydration.  Recommend follow-up in 1 to 2 weeks or sooner as needed should symptoms persist or worsen.    Orders:  •  POCT Rapid Covid Ag  •  doxycycline hyclate (VIBRA-TABS) 100 mg tablet; Take 1 tablet (100 mg total) by mouth 2 (two) times a day for 7 days           History of Present Illness   Juana is a 64-year-old female with past medical history of multiple myeloma, hypertension, status post mitral valve replacement who presents today for evaluation regarding symptoms of upper respiratory illness.  Patient started last week with symptoms of nasal congestion which have been progressively worsening.  Notes sinus pressure, postnasal drip and cough.  Notes some chest pain related to significant coughing.    URI   Associated symptoms include chest pain, congestion and coughing.   Chest Pain   Associated symptoms include a cough. Pertinent negatives include no fever.     Review of Systems   Constitutional:  Negative for chills and fever.   HENT:  Positive for congestion and sinus pressure.    Respiratory:  Positive for cough.    Cardiovascular:  Positive for chest pain.       Objective   /80 (BP Location: Left arm, Patient Position: Sitting, Cuff Size: Large)   Pulse 80   Temp 98.9 °F (37.2 °C) (Tympanic)   Ht 5' 1\" (1.549 m)   Wt 93.8 kg (206 lb 12.8 oz)   SpO2 97%   BMI 39.07 kg/m²      Physical Exam  Vitals reviewed.   Constitutional:       General: She is not in acute " distress.     Appearance: She is well-developed.   HENT:      Head: Normocephalic and atraumatic.      Right Ear: Tympanic membrane, ear canal and external ear normal.      Left Ear: Tympanic membrane, ear canal and external ear normal.      Nose: Nose normal.      Mouth/Throat:      Mouth: Mucous membranes are moist.      Pharynx: Oropharynx is clear.   Eyes:      Conjunctiva/sclera: Conjunctivae normal.      Pupils: Pupils are equal, round, and reactive to light.   Cardiovascular:      Rate and Rhythm: Normal rate and regular rhythm.      Heart sounds: No murmur heard.  Pulmonary:      Effort: Pulmonary effort is normal. No respiratory distress.      Breath sounds: Normal breath sounds.   Abdominal:      Palpations: Abdomen is soft.      Tenderness: There is no abdominal tenderness.   Musculoskeletal:      Cervical back: Neck supple.      Right lower leg: No edema.      Left lower leg: No edema.   Lymphadenopathy:      Cervical: No cervical adenopathy.   Skin:     General: Skin is warm and dry.   Neurological:      General: No focal deficit present.      Mental Status: She is alert and oriented to person, place, and time.   Psychiatric:         Mood and Affect: Mood normal.         Behavior: Behavior normal.

## 2025-01-31 ENCOUNTER — ANTICOAG VISIT (OUTPATIENT)
Dept: CARDIOLOGY CLINIC | Facility: CLINIC | Age: 65
End: 2025-01-31

## 2025-01-31 DIAGNOSIS — Z95.2 S/P MVR (MITRAL VALVE REPLACEMENT): Primary | ICD-10-CM

## 2025-02-10 DIAGNOSIS — E03.9 HYPOTHYROIDISM, UNSPECIFIED TYPE: ICD-10-CM

## 2025-02-10 DIAGNOSIS — I10 ESSENTIAL HYPERTENSION: ICD-10-CM

## 2025-02-11 RX ORDER — LEVOTHYROXINE SODIUM 112 UG/1
112 TABLET ORAL DAILY
Qty: 90 TABLET | Refills: 1 | Status: SHIPPED | OUTPATIENT
Start: 2025-02-11

## 2025-02-11 RX ORDER — ATENOLOL 25 MG/1
25 TABLET ORAL DAILY
Qty: 90 TABLET | Refills: 3 | Status: SHIPPED | OUTPATIENT
Start: 2025-02-11

## 2025-02-17 ENCOUNTER — ANTICOAG VISIT (OUTPATIENT)
Dept: CARDIOLOGY CLINIC | Facility: CLINIC | Age: 65
End: 2025-02-17

## 2025-02-17 DIAGNOSIS — Z95.2 S/P MVR (MITRAL VALVE REPLACEMENT): Primary | ICD-10-CM

## 2025-03-17 DIAGNOSIS — Z95.2 S/P MVR (MITRAL VALVE REPLACEMENT): ICD-10-CM

## 2025-03-18 RX ORDER — WARFARIN SODIUM 2.5 MG/1
TABLET ORAL
Qty: 90 TABLET | Refills: 2 | Status: SHIPPED | OUTPATIENT
Start: 2025-03-18

## 2025-03-25 ENCOUNTER — RA CDI HCC (OUTPATIENT)
Dept: OTHER | Facility: HOSPITAL | Age: 65
End: 2025-03-25

## 2025-03-26 DIAGNOSIS — Z95.2 S/P MVR (MITRAL VALVE REPLACEMENT): Primary | ICD-10-CM

## 2025-03-28 ENCOUNTER — ANTICOAG VISIT (OUTPATIENT)
Dept: CARDIOLOGY CLINIC | Facility: CLINIC | Age: 65
End: 2025-03-28

## 2025-03-28 DIAGNOSIS — Z95.2 S/P MVR (MITRAL VALVE REPLACEMENT): Primary | ICD-10-CM

## 2025-04-01 ENCOUNTER — OFFICE VISIT (OUTPATIENT)
Dept: FAMILY MEDICINE CLINIC | Facility: CLINIC | Age: 65
End: 2025-04-01
Payer: COMMERCIAL

## 2025-04-01 VITALS
DIASTOLIC BLOOD PRESSURE: 74 MMHG | WEIGHT: 208.4 LBS | RESPIRATION RATE: 16 BRPM | OXYGEN SATURATION: 98 % | HEIGHT: 61 IN | HEART RATE: 72 BPM | SYSTOLIC BLOOD PRESSURE: 114 MMHG | BODY MASS INDEX: 39.35 KG/M2 | TEMPERATURE: 97.3 F

## 2025-04-01 DIAGNOSIS — E03.9 HYPOTHYROIDISM, UNSPECIFIED TYPE: ICD-10-CM

## 2025-04-01 DIAGNOSIS — Z00.00 ANNUAL PHYSICAL EXAM: Primary | ICD-10-CM

## 2025-04-01 DIAGNOSIS — Z12.12 SCREENING FOR COLORECTAL CANCER: ICD-10-CM

## 2025-04-01 DIAGNOSIS — E85.9 MYELOMA ASSOCIATED AMYLOIDOSIS (HCC): ICD-10-CM

## 2025-04-01 DIAGNOSIS — I10 ESSENTIAL HYPERTENSION: ICD-10-CM

## 2025-04-01 DIAGNOSIS — D69.49 PRIMARY THROMBOCYTOPENIA (HCC): ICD-10-CM

## 2025-04-01 DIAGNOSIS — C90.00 MYELOMA ASSOCIATED AMYLOIDOSIS (HCC): ICD-10-CM

## 2025-04-01 DIAGNOSIS — M32.9 SYSTEMIC LUPUS ERYTHEMATOSUS, UNSPECIFIED SLE TYPE, UNSPECIFIED ORGAN INVOLVEMENT STATUS (HCC): ICD-10-CM

## 2025-04-01 DIAGNOSIS — Z12.11 SCREENING FOR COLORECTAL CANCER: ICD-10-CM

## 2025-04-01 DIAGNOSIS — R73.03 PREDIABETES: ICD-10-CM

## 2025-04-01 PROCEDURE — 99396 PREV VISIT EST AGE 40-64: CPT | Performed by: FAMILY MEDICINE

## 2025-04-01 NOTE — ASSESSMENT & PLAN NOTE
Blood pressure well-controlled on valsartan and atenolol.  Continue present treatment and follow a low-salt diet and get regular aerobic exercise 150 minutes/week.  Orders:    Lipid Panel with Direct LDL reflex; Future

## 2025-04-01 NOTE — ASSESSMENT & PLAN NOTE
Clinically euthyroid and recent TSH within normal limits.  Continue levothyroxine 112 mcg daily.  Orders:    Lipid Panel with Direct LDL reflex; Future

## 2025-04-01 NOTE — PROGRESS NOTES
:  Assessment & Plan  Annual physical exam  Labs ordered for fasting lipid panel and hemoglobin A1c.  Will heed results.  Discussed lifestyle changes.  Instructed to follow a low-fat, low-salt and a low sugar/carbohydrate diet and continue regular aerobic exercise up to 150 minutes/week and weight resistance exercise.  Patient to schedule screening colonoscopy.  Patient to follow-up with specialist as scheduled.  Return to the office in 1 year.       Hypothyroidism, unspecified type  Clinically euthyroid and recent TSH within normal limits.  Continue levothyroxine 112 mcg daily.  Orders:    Lipid Panel with Direct LDL reflex; Future    Essential hypertension  Blood pressure well-controlled on valsartan and atenolol.  Continue present treatment and follow a low-salt diet and get regular aerobic exercise 150 minutes/week.  Orders:    Lipid Panel with Direct LDL reflex; Future    Prediabetes  Labs ordered for hemoglobin A1c.  Patient instructed to follow a low sugar and low carbohydrate diet more carefully and continue regular aerobic exercise and weight loss encouraged.  Orders:    Lipid Panel with Direct LDL reflex; Future    Hemoglobin A1C; Future    Myeloma associated amyloidosis (HCC)  Clinically stable.  Continue present treatment and follow-up with hematology/oncology as scheduled.       Primary thrombocytopenia (HCC)  Platelet count stable.  Continue present treatment and follow-up with hematology/oncology as scheduled.       Systemic lupus erythematosus, unspecified SLE type, unspecified organ involvement status (HCC)  Clinically stable.  Continue present treatment and follow-up with rheumatology as scheduled.       Screening for colorectal cancer  Patient to schedule follow-up screening colonoscopy.  Orders:    Ambulatory Referral to Gastroenterology; Future        History of Present Illness     Juana Linares is a 64 y.o. female   Patient is here for annual physical exam and follow-up of chronic  "conditions.  Recent labs reviewed.  Patient follows with multiple specialists including cardiology, nephrology, rheumatology and hematology/oncology regularly and gets regular lab work.  Patient is up-to-date on GYN exam, mammogram and DEXA bone scan.  Patient is due for follow-up colonoscopy and encouraged to schedule.  Patient is feeling fairly well overall and has been exercising regularly 3 to 4 days a week for 30 to 45 minutes doing cardio and weight training.  Patient admits to difficulty losing weight.    Thyroid Problem  Presents for follow-up visit. Symptoms include dry skin. Patient reports no anxiety, cold intolerance, constipation, depressed mood, diaphoresis, diarrhea, fatigue, hair loss, heat intolerance, hoarse voice, leg swelling, palpitations, tremors, visual change, weight gain or weight loss. The symptoms have been stable.     Review of Systems   Constitutional:  Negative for diaphoresis, fatigue, weight gain and weight loss.   HENT:  Negative for hoarse voice.    Cardiovascular:  Negative for palpitations.   Gastrointestinal:  Negative for constipation and diarrhea.   Endocrine: Negative for cold intolerance and heat intolerance.   Neurological:  Negative for tremors.   Psychiatric/Behavioral:  The patient is not nervous/anxious.      Objective   /74 (BP Location: Left arm, Patient Position: Sitting, Cuff Size: Large)   Pulse 71   Temp (!) 97.3 °F (36.3 °C) (Tympanic)   Ht 5' 1\" (1.549 m)   Wt 94.5 kg (208 lb 6.4 oz)   SpO2 98%   BMI 39.38 kg/m²      Physical Exam  Constitutional:       General: She is not in acute distress.     Appearance: Normal appearance. She is obese.   HENT:      Head: Normocephalic.      Mouth/Throat:      Mouth: Mucous membranes are moist.   Eyes:      General: No scleral icterus.     Conjunctiva/sclera: Conjunctivae normal.   Neck:      Vascular: No carotid bruit.   Cardiovascular:      Rate and Rhythm: Normal rate and regular rhythm.   Pulmonary:      " Effort: Pulmonary effort is normal.      Breath sounds: Normal breath sounds.   Abdominal:      Palpations: Abdomen is soft.      Tenderness: There is no abdominal tenderness.   Musculoskeletal:      Cervical back: Neck supple.      Right lower leg: No edema.      Left lower leg: No edema.   Lymphadenopathy:      Cervical: No cervical adenopathy.   Skin:     General: Skin is warm and dry.   Neurological:      General: No focal deficit present.      Mental Status: She is alert and oriented to person, place, and time.   Psychiatric:         Mood and Affect: Mood normal.         Behavior: Behavior normal.         Thought Content: Thought content normal.         Judgment: Judgment normal.           Answers submitted by the patient for this visit:  Annual Physical (Submitted on 3/27/2025)  Diet/Nutrition choices: no special diet, well balanced diet, limited junk food, low carb diet  Exercise choices: walking, moderate cardiovascular exercise, strength training exercises, 3-4 times a week on average, 30-60 minutes on average  Sleep choices: sleeps well, 7-8 hours of sleep on average  Hearing choices: decreased hearing left ear  Vision choices: no vision problems  Dental choices: regular dental visits, brushes teeth three times daily  Do you currently have an OB/GYN provider that you routinely follow with?: Yes  Menopausal status: postmenopausal  Any history of sexual transmitted disease/infection?: No  Contraception: male partner had vasectomy  Do you have an advance directive/living will?: Yes  Do you have a durable power of  (POA)?: No

## 2025-04-01 NOTE — ASSESSMENT & PLAN NOTE
Clinically stable.  Continue present treatment and follow-up with hematology/oncology as scheduled.

## 2025-04-01 NOTE — ASSESSMENT & PLAN NOTE
Platelet count stable.  Continue present treatment and follow-up with hematology/oncology as scheduled.

## 2025-04-01 NOTE — ASSESSMENT & PLAN NOTE
Labs ordered for hemoglobin A1c.  Patient instructed to follow a low sugar and low carbohydrate diet more carefully and continue regular aerobic exercise and weight loss encouraged.  Orders:    Lipid Panel with Direct LDL reflex; Future    Hemoglobin A1C; Future

## 2025-04-23 ENCOUNTER — ANTICOAG VISIT (OUTPATIENT)
Dept: CARDIOLOGY CLINIC | Facility: CLINIC | Age: 65
End: 2025-04-23

## 2025-04-23 DIAGNOSIS — Z95.2 S/P MVR (MITRAL VALVE REPLACEMENT): Primary | ICD-10-CM

## 2025-04-23 LAB
CHOLEST SERPL-MCNC: 145 MG/DL
CHOLEST/HDLC SERPL: 3.6 {RATIO}
EST. AVERAGE GLUCOSE BLD GHB EST-MCNC: 131 MG/DL
HBA1C MFR BLD: 6.2 %
HDLC SERPL-MCNC: 40 MG/DL (ref 23–92)
LDLC SERPL CALC-MCNC: 83 MG/DL
NONHDLC SERPL-MCNC: 105 MG/DL
TRIGL SERPL-MCNC: 112 MG/DL

## 2025-05-06 ENCOUNTER — OFFICE VISIT (OUTPATIENT)
Dept: CARDIOLOGY CLINIC | Facility: CLINIC | Age: 65
End: 2025-05-06
Payer: COMMERCIAL

## 2025-05-06 VITALS
DIASTOLIC BLOOD PRESSURE: 80 MMHG | SYSTOLIC BLOOD PRESSURE: 172 MMHG | HEIGHT: 61 IN | HEART RATE: 80 BPM | OXYGEN SATURATION: 100 % | BODY MASS INDEX: 40.33 KG/M2 | WEIGHT: 213.6 LBS

## 2025-05-06 DIAGNOSIS — R00.2 PALPITATIONS: ICD-10-CM

## 2025-05-06 DIAGNOSIS — Z95.2 S/P MVR (MITRAL VALVE REPLACEMENT): ICD-10-CM

## 2025-05-06 DIAGNOSIS — C90.00 MYELOMA ASSOCIATED AMYLOIDOSIS (HCC): ICD-10-CM

## 2025-05-06 DIAGNOSIS — I34.0 MITRAL VALVE INSUFFICIENCY, UNSPECIFIED ETIOLOGY: Primary | ICD-10-CM

## 2025-05-06 DIAGNOSIS — I10 ESSENTIAL HYPERTENSION: ICD-10-CM

## 2025-05-06 DIAGNOSIS — E85.9 MYELOMA ASSOCIATED AMYLOIDOSIS (HCC): ICD-10-CM

## 2025-05-06 PROCEDURE — 99214 OFFICE O/P EST MOD 30 MIN: CPT | Performed by: INTERNAL MEDICINE

## 2025-05-06 RX ORDER — AMLODIPINE BESYLATE 5 MG/1
5 TABLET ORAL DAILY
Qty: 90 TABLET | Refills: 3 | Status: SHIPPED | OUTPATIENT
Start: 2025-05-06

## 2025-05-06 RX ORDER — VALACYCLOVIR HYDROCHLORIDE 500 MG/1
500 TABLET, FILM COATED ORAL DAILY
COMMUNITY
Start: 2025-04-11

## 2025-05-06 NOTE — PROGRESS NOTES
Cardiology   Juana Linares 64 y.o. female MRN: [id number]        Impression:  1. S/P mechanical MVR - doing well.  On Warfarin.  2. Multiple myeloma  3. Hypertension - Not adequate control.   4. Palpitations - resolved.      Recommendations:  1. Start amlodipine 5mg daily.  2. Continue remainder of medications.  3. Check echo to evaluate valvular heart disease.   4. Follow up in one year.           HPI: Juana Linares is a 64 y.o. year old female with a history of mechancial MVR, HTN, SLE, multiple myeloma, who presents for follow up.  Echocardiogram 4/19 demonstrated normal LV systolic function with normal MVR. Diagnosed with multiple myeloma, and is undergoing chemo. No further palpitations.  No chest pain, shortness of breath.  Echo 2021 demonstrated normal MVR, and holter demonstrated no significant dysrhythmia. Admitted to [facility] 1/23 with pneumonia.  Echo at that time demonstrated normal LV systolic function. BP 130s-140s at home.         Review of Systems   Constitutional: Negative.    HENT: Negative.     Eyes: Negative.    Respiratory:  Negative for chest tightness and shortness of breath.    Cardiovascular:  Negative for chest pain, palpitations and leg swelling.   Gastrointestinal: Negative.    Endocrine: Negative.    Genitourinary: Negative.    Musculoskeletal: Negative.    Skin: Negative.    Allergic/Immunologic: Negative.    Neurological: Negative.    Hematological: Negative.    Psychiatric/Behavioral: Negative.     All other systems reviewed and are negative.        Past Medical History:   Diagnosis Date    COVID-19 02/2021    Disease of thyroid gland     History of ITP     History of transfusion     Hypertension     Lyme disease     Sleep apnea      Past Surgical History:   Procedure Laterality Date    MITRAL VALVE REPLACEMENT      VALVE REPLACEMENT       Social History     Substance and Sexual Activity   Alcohol Use Not Currently    Comment: Social     Social History     Substance and Sexual  Activity   Drug Use No     Social History     Tobacco Use   Smoking Status Never   Smokeless Tobacco Never     Family History   Problem Relation Age of Onset    Diabetes Father     Other Father         Multiple myeloma     Coronary artery disease Maternal Grandmother     Diabetes Maternal Grandmother        Allergies:  Allergies   Allergen Reactions    Erythromycin     Penicillins Delirium and Hives    Percocet [Oxycodone-Acetaminophen] Nausea Only       Medications:     Current Outpatient Medications:     albuterol (ProAir HFA) 90 mcg/act inhaler, Inhale 2 puffs every 6 (six) hours as needed for wheezing, Disp: 8.5 g, Rfl: 0    allopurinol (ZYLOPRIM) 100 mg tablet, Take 100 mg by mouth daily , Disp: , Rfl:     amLODIPine (NORVASC) 5 mg tablet, Take 1 tablet (5 mg total) by mouth daily, Disp: 90 tablet, Rfl: 3    ascorbic acid (VITAMIN C) 500 mg tablet, Take 500 mg by mouth daily, Disp: , Rfl:     atenolol (TENORMIN) 25 mg tablet, TAKE 1 TABLET DAILY, Disp: 90 tablet, Rfl: 3    Cholecalciferol (VITAMIN D-3) 1000 units CAPS, Take 2,000 Units by mouth daily, Disp: , Rfl:     ergocalciferol (VITAMIN D2) 50,000 units, Take by mouth once a week , Disp: , Rfl:     folic acid (FOLVITE) 1 mg tablet, Take 1 mg by mouth daily, Disp: , Rfl:     hydroxychloroquine (PLAQUENIL) 200 mg tablet, Take by mouth, Disp: , Rfl:     Iron-Vitamin C 100-250 MG TABS, iron, Disp: , Rfl:     levothyroxine 112 mcg tablet, TAKE 1 TABLET DAILY, Disp: 90 tablet, Rfl: 1    Magnesium 400 MG CAPS, Take 4 capsules by mouth daily , Disp: , Rfl:     Multiple Vitamin (multivitamin) capsule, Take 1 capsule by mouth daily, Disp: , Rfl:     pantoprazole (PROTONIX) 40 mg tablet, prn, Disp: , Rfl:     Probiotic Product (PROBIOTIC-10) CHEW, Chew, Disp: , Rfl:     valACYclovir (VALTREX) 500 mg tablet, Take 500 mg by mouth daily, Disp: , Rfl:     valsartan (DIOVAN) 320 MG tablet, Take 1 tablet (320 mg total) by mouth daily, Disp: 90 tablet, Rfl: 3    warfarin  (Coumadin) 2.5 mg tablet, TAKE 1/2 TO 1 TABLET DAILY BY MOUTH OR AS DIRECTED BY PHYSICIAN., Disp: 30 tablet, Rfl: 3    warfarin (COUMADIN) 2.5 mg tablet, TAKE 1/2 TO 1 TABLET DAILY OR AS ORDERED BY PHYSICIAN, Disp: 90 tablet, Rfl: 2    clindamycin (CLEOCIN) 150 mg capsule, , Disp: , Rfl:       Wt Readings from Last 3 Encounters:   25 96.9 kg (213 lb 9.6 oz)   25 94.5 kg (208 lb 6.4 oz)   25 93.8 kg (206 lb 12.8 oz)     Temp Readings from Last 3 Encounters:   25 (!) 97.3 °F (36.3 °C) (Tympanic)   25 98.9 °F (37.2 °C) (Tympanic)   24 (!) 100.9 °F (38.3 °C) (Tympanic)     BP Readings from Last 3 Encounters:   25 (!) 172/80   25 114/74   25 160/80     Pulse Readings from Last 3 Encounters:   25 80   25 72   25 80         Physical Exam  HENT:      Head: Atraumatic.      Mouth/Throat:      Mouth: Mucous membranes are moist.   Eyes:      Extraocular Movements: Extraocular movements intact.   Cardiovascular:      Rate and Rhythm: Normal rate and regular rhythm.      Comments: Mechanical heart sounds  Musculoskeletal:      Cervical back: Normal range of motion.   Neurological:      Mental Status: She is alert.           Laboratory Studies:  CMP:  Lab Results   Component Value Date    K 4 2025     2025    CO2 28 2025    BUN 26 (H) 2025    CREATININE 1.44 (H) 2025    AST 30 2025    ALT 31 2025    EGFR 41 (L) 2025       Cardiac testing:   EKG reviewed personally:   Results for orders placed during the hospital encounter of 21    Echo complete with contrast if indicated    Narrative  [address] [address] [phone]    Transthoracic Echocardiogram  2D, M-mode, Doppler, and Color Doppler    Study date:  2021    Patient: MENA PABLO  MR number: [id number]  Account number: [id number]  : 1960  Age: 60 years  Gender:  Female  Status: Outpatient  Location: [address] [place] [address]  Height: 61 in  Weight: 211 lb  BP: 148/ 90 mmHg    Indications: Palpitations    Diagnoses: R00.2 - Palpitations    Sonographer:  SHRADDHA Kraft  Primary Physician:  Kevin Quiroz DO  Referring Physician:  Iván Bianchi MD  Group:  [facility]  Interpreting Physician:  Ferny Potter MD    SUMMARY    LEFT VENTRICLE:  Systolic function was normal. Ejection fraction was estimated to be 60 %.  There were no regional wall motion abnormalities.    MITRAL VALVE:  A [employer] mechanical prosthesis was present. It exhibited normal function.  Mean transmitral gradient was 3 mmHg.    AORTIC VALVE:  There was no evidence for stenosis.    TRICUSPID VALVE:  There was trace regurgitation.  Pulmonary artery systolic pressure was mildly increased.  Estimated peak PA pressure was 38 mmHg.    HISTORY: PRIOR HISTORY: Hypertension, mechanical mitral valve replacement, sleep apnea, COVID-19    PROCEDURE: The study was performed in the [address] [place] [address]. This was a routine study. The transthoracic approach was used. The study included complete 2D imaging, M-mode, complete spectral Doppler, and color Doppler. Image  quality was adequate.    LEFT VENTRICLE: Size was normal. Systolic function was normal. Ejection fraction was estimated to be 60 %. There were no regional wall motion abnormalities. Wall thickness was normal. DOPPLER: The study was not technically sufficient to  allow evaluation of LV diastolic function.    RIGHT VENTRICLE: The size was normal. Systolic function was normal. Wall thickness was normal.    LEFT ATRIUM: Size was at the upper limits of normal.    RIGHT ATRIUM: Size was normal.    MITRAL VALVE: There was normal leaflet separation. A [employer] [place] mechanical prosthesis was present. It exhibited normal function. DOPPLER: The transmitral velocity was within the normal range. There was no evidence for  stenosis. There was no  significant regurgitation.    AORTIC VALVE: The valve was trileaflet. Leaflets exhibited normal thickness and normal cuspal separation. DOPPLER: Transaortic velocity was within the normal range. There was no evidence for stenosis. There was no regurgitation.    TRICUSPID VALVE: The valve structure was normal. There was normal leaflet separation. DOPPLER: The transtricuspid velocity was within the normal range. There was no evidence for stenosis. There was trace regurgitation. Pulmonary artery  systolic pressure was mildly increased. Estimated peak PA pressure was 38 mmHg.    PULMONIC VALVE: Leaflets exhibited normal thickness, no calcification, and normal cuspal separation. DOPPLER: The transpulmonic velocity was within the normal range. There was mild regurgitation.    PERICARDIUM: There was no pericardial effusion. The pericardium was normal in appearance.    AORTA: The root exhibited normal size.    SYSTEMIC VEINS: IVC: The inferior vena cava was normal in size.    MEASUREMENT TABLES    DOPPLER MEASUREMENTS  Mitral valve   (Reference normals)  Mean gradient   3 mmHg   (--)    SYSTEM MEASUREMENT TABLES    2D  %FS: 36.11 %  Ao Diam: 3.17 cm  EDV(Teich): 121.95 ml  EF(Teich): 65.45 %  ESV(Teich): 42.13 ml  IVSd: 1.03 cm  LA Area: 21.06 cm2  LA Diam: 3.99 cm  LVEDV MOD A4C: 141.74 ml  LVEF MOD A4C: 69.35 %  LVESV MOD A4C: 43.44 ml  LVIDd: 5.07 cm  LVIDs: 3.24 cm  LVLd A4C: 7.49 cm  LVLs A4C: 6.06 cm  LVPWd: 1.12 cm  RA Area: 12.76 cm2  RVIDd: 3.93 cm  SV MOD A4C: 98.29 ml  SV(Teich): 79.82 ml    CW  TR Vmax: 2.87 m/s  TR maxP.92 mmHg    MM  TAPSE: 2.23 cm    PW  E' Sept: 0.07 m/s  E/E' Sept: 23.71  MV A [place]: 1.15 m/s  MV Dec [place]: 4.71 m/s2  MV DecT: 363.21 ms  MV E Bobby: 1.71 m/s  MV E/A Ratio: 1.49  MV PHT: 105.33 ms  MVA By PHT: 2.09 cm2    [employer] Accredited Echocardiography Laboratory    Prepared and electronically signed by    Ferny Potter MD  Signed 2021  15:28:27    No results found for this or any previous visit.    No results found for this or any previous visit.    No results found for this or any previous visit.

## 2025-05-19 ENCOUNTER — ANTICOAG VISIT (OUTPATIENT)
Dept: CARDIOLOGY CLINIC | Facility: CLINIC | Age: 65
End: 2025-05-19

## 2025-05-19 DIAGNOSIS — Z95.2 S/P MVR (MITRAL VALVE REPLACEMENT): Primary | ICD-10-CM

## 2025-06-03 ENCOUNTER — ANTICOAG VISIT (OUTPATIENT)
Dept: CARDIOLOGY CLINIC | Facility: CLINIC | Age: 65
End: 2025-06-03

## 2025-06-03 DIAGNOSIS — Z95.2 S/P MVR (MITRAL VALVE REPLACEMENT): Primary | ICD-10-CM

## 2025-06-06 ENCOUNTER — HOSPITAL ENCOUNTER (OUTPATIENT)
Dept: NON INVASIVE DIAGNOSTICS | Facility: CLINIC | Age: 65
Discharge: HOME | End: 2025-06-06
Attending: INTERNAL MEDICINE
Payer: COMMERCIAL

## 2025-06-06 VITALS
BODY MASS INDEX: 40.22 KG/M2 | DIASTOLIC BLOOD PRESSURE: 80 MMHG | SYSTOLIC BLOOD PRESSURE: 172 MMHG | HEART RATE: 80 BPM | HEIGHT: 61 IN | WEIGHT: 213 LBS

## 2025-06-06 DIAGNOSIS — Z95.2 S/P MVR (MITRAL VALVE REPLACEMENT): ICD-10-CM

## 2025-06-06 DIAGNOSIS — I10 ESSENTIAL HYPERTENSION: ICD-10-CM

## 2025-06-06 LAB
AORTIC ROOT: 3.2 CM
ASCENDING AORTA: 3.1 CM
BSA FOR ECHO PROCEDURE: 1.94 M2
DOP CALC MV VTI: 53.73 CM
E WAVE DECELERATION TIME: 275 MS
E/A RATIO: 1.68
FRACTIONAL SHORTENING: 38 (ref 28–44)
INTERVENTRICULAR SEPTUM IN DIASTOLE (PARASTERNAL SHORT AXIS VIEW): 1 CM
INTERVENTRICULAR SEPTUM: 1 CM (ref 0.6–1.1)
LAAS-AP2: 26.9 CM2
LAAS-AP4: 26.4 CM2
LEFT ATRIUM SIZE: 4.7 CM
LEFT ATRIUM VOLUME (MOD BIPLANE): 92 ML
LEFT ATRIUM VOLUME INDEX (MOD BIPLANE): 47.4 ML/M2
LEFT INTERNAL DIMENSION IN SYSTOLE: 3.1 CM (ref 2.1–4)
LEFT VENTRICULAR INTERNAL DIMENSION IN DIASTOLE: 5 CM (ref 3.5–6)
LEFT VENTRICULAR POSTERIOR WALL IN END DIASTOLE: 0.9 CM
LEFT VENTRICULAR STROKE VOLUME: 79 ML
LV EF US.2D.A4C+ESTIMATED: 52 %
LVSV (TEICH): 79 ML
MV E'TISSUE VEL-LAT: 10 CM/S
MV E'TISSUE VEL-SEP: 9 CM/S
MV MEAN GRADIENT: 6 MMHG
MV PEAK A VEL: 1.01 M/S
MV PEAK E VEL: 170 CM/S
MV PEAK GRADIENT: 14 MMHG
MV STENOSIS PRESSURE HALF TIME: 80 MS
MV VALVE AREA P 1/2 METHOD: 2.75
RA PRESSURE ESTIMATED: 5 MMHG
RIGHT ATRIUM AREA SYSTOLE A4C: 14.2 CM2
RIGHT VENTRICLE ID DIMENSION: 3.4 CM
RV PSP: 38 MMHG
SL CV LEFT ATRIUM LENGTH A2C: 5.9 CM
SL CV LV EF: 50
SL CV PED ECHO LEFT VENTRICLE DIASTOLIC VOLUME (MOD BIPLANE) 2D: 117 ML
SL CV PED ECHO LEFT VENTRICLE SYSTOLIC VOLUME (MOD BIPLANE) 2D: 38 ML
TR MAX PG: 33 MMHG
TR PEAK VELOCITY: 2.9 M/S
TRICUSPID ANNULAR PLANE SYSTOLIC EXCURSION: 2.2 CM
TRICUSPID VALVE PEAK REGURGITATION VELOCITY: 2.88 M/S

## 2025-06-06 PROCEDURE — 93306 TTE W/DOPPLER COMPLETE: CPT

## 2025-06-06 PROCEDURE — 93306 TTE W/DOPPLER COMPLETE: CPT | Performed by: INTERNAL MEDICINE

## 2025-06-13 ENCOUNTER — ANTICOAG VISIT (OUTPATIENT)
Dept: CARDIOLOGY CLINIC | Facility: CLINIC | Age: 65
End: 2025-06-13

## 2025-06-13 DIAGNOSIS — Z95.2 S/P MVR (MITRAL VALVE REPLACEMENT): Primary | ICD-10-CM

## 2025-07-16 ENCOUNTER — ANTICOAG VISIT (OUTPATIENT)
Dept: CARDIOLOGY CLINIC | Facility: CLINIC | Age: 65
End: 2025-07-16

## 2025-07-16 DIAGNOSIS — Z95.2 S/P MVR (MITRAL VALVE REPLACEMENT): Primary | ICD-10-CM

## 2025-07-16 LAB
INR PPP: 3.1
PROTHROMBIN TIME: 31.1 SEC (ref 12–14.6)

## 2025-07-30 ENCOUNTER — RESULTS FOLLOW-UP (OUTPATIENT)
Dept: CARDIOLOGY CLINIC | Facility: CLINIC | Age: 65
End: 2025-07-30

## 2025-07-30 DIAGNOSIS — I10 ESSENTIAL HYPERTENSION: ICD-10-CM

## 2025-08-09 RX ORDER — AMLODIPINE BESYLATE 5 MG/1
5 TABLET ORAL DAILY
Qty: 90 TABLET | Refills: 3 | Status: SHIPPED | OUTPATIENT
Start: 2025-08-09

## 2025-08-12 ENCOUNTER — ANTICOAG VISIT (OUTPATIENT)
Dept: CARDIOLOGY CLINIC | Facility: CLINIC | Age: 65
End: 2025-08-12

## 2025-08-21 ENCOUNTER — ANTICOAG VISIT (OUTPATIENT)
Dept: CARDIOLOGY CLINIC | Facility: CLINIC | Age: 65
End: 2025-08-21

## 2025-08-21 DIAGNOSIS — Z95.2 S/P MVR (MITRAL VALVE REPLACEMENT): Primary | ICD-10-CM
